# Patient Record
Sex: MALE | Race: WHITE | NOT HISPANIC OR LATINO | Employment: FULL TIME | URBAN - METROPOLITAN AREA
[De-identification: names, ages, dates, MRNs, and addresses within clinical notes are randomized per-mention and may not be internally consistent; named-entity substitution may affect disease eponyms.]

---

## 2017-02-18 ENCOUNTER — OFFICE VISIT (OUTPATIENT)
Dept: URGENT CARE | Facility: CLINIC | Age: 47
End: 2017-02-18
Payer: COMMERCIAL

## 2017-02-18 PROCEDURE — 99203 OFFICE O/P NEW LOW 30 MIN: CPT

## 2017-03-27 ENCOUNTER — ALLSCRIPTS OFFICE VISIT (OUTPATIENT)
Dept: FAMILY MEDICINE CLINIC | Facility: CLINIC | Age: 47
End: 2017-03-27
Payer: COMMERCIAL

## 2017-03-27 DIAGNOSIS — I10 ESSENTIAL (PRIMARY) HYPERTENSION: ICD-10-CM

## 2017-03-27 DIAGNOSIS — E29.1 TESTICULAR HYPOFUNCTION: ICD-10-CM

## 2017-03-27 DIAGNOSIS — E78.00 PURE HYPERCHOLESTEROLEMIA: ICD-10-CM

## 2017-03-27 DIAGNOSIS — R53.83 OTHER FATIGUE: ICD-10-CM

## 2017-03-27 PROCEDURE — 99213 OFFICE O/P EST LOW 20 MIN: CPT | Performed by: PHYSICIAN ASSISTANT

## 2017-05-22 ENCOUNTER — GENERIC CONVERSION - ENCOUNTER (OUTPATIENT)
Dept: OTHER | Facility: OTHER | Age: 47
End: 2017-05-22

## 2017-11-29 ENCOUNTER — ALLSCRIPTS OFFICE VISIT (OUTPATIENT)
Dept: FAMILY MEDICINE CLINIC | Facility: CLINIC | Age: 47
End: 2017-11-29
Payer: COMMERCIAL

## 2017-11-29 DIAGNOSIS — M47.816 SPONDYLOSIS OF LUMBAR REGION WITHOUT MYELOPATHY OR RADICULOPATHY: ICD-10-CM

## 2017-11-29 PROCEDURE — 99214 OFFICE O/P EST MOD 30 MIN: CPT | Performed by: FAMILY MEDICINE

## 2017-11-30 NOTE — PROGRESS NOTES
Assessment    1  Benign hypertension (401 1) (I10)   2  ADHD (attention deficit hyperactivity disorder), combined type (314 01) (F90 2)   3  Spondylolisthesis of sacral region (756 12) (M43 18)   4  Lumbar spondylosis (721 3) (M47 816)   5  Borderline hyperlipidemia (272 4) (E78 5)    Plan  Lumbar spondylosis    · Atorvastatin Calcium 10 MG Oral Tablet; TAKE 1 TABLET DAILY   · *1 - [facility]  *  Status: Active  Requested for: 24HXC2289  Care Summary provided  : Yes    Discussion/Summary    Will try slo mag vision is ok will set up physical therapy of r spondylolisthesis sacral spine still with memory issues and will try prevegen  The patient was counseled regarding  Chief Complaint  Patient is here for a routine follow up  Patient states he had a flu shot last month  Patient would like to discuss back issues  History of Present Illness  Pt is here for checkup and feeling ok and appeite is ok and sleeps hit and miss most of the time ok Energy level ok gym x 4 a week bm are ok Back pain is getting worse      Review of Systems   Constitutional: No fever or chills, feels well, no tiredness, no recent weight gain or weight loss  Eyes: No complaints of eye pain, no red eyes, no discharge from eyes, no itchy eyes  ENT: no complaints of earache, no hearing loss, no nosebleeds, no nasal discharge, no sore throat, no hoarseness  Cardiovascular: No complaints of slow heart rate, no fast heart rate, no chest pain, no palpitations, no leg claudication, no lower extremity  Respiratory: No complaints of shortness of breath, no wheezing, no cough, no SOB on exertion, no orthopnea or PND  Gastrointestinal: No complaints of abdominal pain, no constipation, no nausea or vomiting, no diarrhea or bloody stools  Genitourinary: No complaints of dysuria, no incontinence, no hesitancy, no nocturia, no genital lesion, no testicular pain    Musculoskeletal: myalgias, but-- No complaints of arthralgia, no myalgias, no joint swelling or stiffness, no limb pain or swelling  Integumentary: No complaints of skin rash or skin lesions, no itching, no skin wound, no dry skin  Neurological: No compliants of headache, no confusion, no convulsions, no numbness or tingling, no dizziness or fainting, no limb weakness, no difficulty walking  Psychiatric: Is not suicidal, no sleep disturbances, no anxiety or depression, no change in personality, no emotional problems  Endocrine: No complaints of proptosis, no hot flashes, no muscle weakness, no erectile dysfunction, no deepening of the voice, no feelings of weakness  Hematologic/Lymphatic: No complaints of swollen glands, no swollen glands in the neck, does not bleed easily, no easy bruising  ROS reviewed  Active Problems  1  ADHD (attention deficit hyperactivity disorder), combined type (314 01) (F90 2)   2  Benign hypertension (401 1) (I10)   3  Depression (311) (F32 9)   4  Fatigue (780 79) (R53 83)   5  Herpes zoster (053 9) (B02 9)   6  High cholesterol (272 0) (E78 00)   7  Leg cramping (729 82) (R25 2)   8  Low testosterone (259 9) (E34 9)   9  Screening for thyroid disorder (V77 0) (Z13 29)   10  Viral gastroenteritis (008 8) (A08 4)    Past Medical History  1  History of erectile dysfunction (V13 89) (J13 596)    The active problems and past medical history were reviewed and updated today  Surgical History  1  History of Elbow Surgery   2  History of Surgery Vas Deferens Vasectomy    The surgical history was reviewed and updated today  Family History  Mother    1  No pertinent family history  Father    2  No pertinent family history    The family history was reviewed and updated today  Social History     · Daily caffeine consumption   ·    · Non-smoker (V49 89) (Z78 9)   · Social alcohol use (Z78 9)  The social history was reviewed and updated today  Current Meds   1   FLUoxetine HCl - 20 MG Oral Tablet; TAKE 1 TABLET DAILY; Therapy: 85CBD8655 to (Evaluate:23Aej5189)  Requested for: 04Llp9372; Last Rx:[id number] Ordered   2  Strattera 40 MG Oral Capsule; Take 1 capsule twice daily; Therapy: 36WRE7896 to (Evaluate:02Msd4570)  Requested for: 12XXJ2284; Last Rx:[id number] Ordered   3  Testosterone 30 MG/ACT Transdermal Solution; USE 2 SWIPES UNDER EACH ARM DAILY; Therapy: 17NQS6094 to (411 8283)  Requested for: 04QYK7436; Last Rx:[id number] Ordered    The medication list was reviewed and updated today  Allergies  1  No Known Drug Allergies  2  Adhesive Tape    Vitals  Vital Signs    Recorded: 69NGE4306 09:00AM   Temperature 96 7 F   Heart Rate 87   Respiration 16   Systolic 478   Diastolic 86   Height 5 ft 6 in   Weight 177 lb    BMI Calculated 28 57   BSA Calculated 1 9   O2 Saturation 98       Physical Exam   Constitutional  General appearance: No acute distress, well appearing and well nourished  Eyes  Conjunctiva and lids: No swelling, erythema, or discharge  Pupils and irises: Equal, round and reactive to light  Ears, Nose, Mouth, and Throat  External inspection of ears and nose: Normal    Otoscopic examination: Tympanic membrance translucent with normal light reflex  Canals patent without erythema  Nasal mucosa, septum, and turbinates: Normal without edema or erythema  Oropharynx: Normal with no erythema, edema, exudate or lesions  Pulmonary  Respiratory effort: No increased work of breathing or signs of respiratory distress  Auscultation of lungs: Clear to auscultation, equal breath sounds bilaterally, no wheezes, no rales, no rhonci  Cardiovascular  Palpation of heart: Normal PMI, no thrills  Auscultation of heart: Normal rate and rhythm, normal S1 and S2, without murmurs  Examination of extremities for edema and/or varicosities: Normal    Carotid pulses: Normal    Abdomen  Abdomen: Non-tender, no masses  Liver and spleen: No hepatomegaly or splenomegaly     Lymphatic  Palpation of lymph nodes in neck: No lymphadenopathy  Musculoskeletal  Gait and station: Normal    Digits and nails: Normal without clubbing or cyanosis  Inspection/palpation of joints, bones, and muscles: Normal    Skin  Skin and subcutaneous tissue: Normal without rashes or lesions  Neurologic  Cranial nerves: Cranial nerves 2-12 intact  Reflexes: 2+ and symmetric  Sensation: No sensory loss     Psychiatric  Orientation to person, place and time: Normal    Mood and affect: Normal          Signatures   Electronically signed by : Lorin Campbell [facility]; Nov 29 2017  9:34AM EST                       (Author)    Electronically signed by : Shelby Boogie MD; Nov 29 2017 11:26AM EST                       (Author)

## 2017-12-12 ENCOUNTER — APPOINTMENT (OUTPATIENT)
Dept: PHYSICAL THERAPY | Facility: REHABILITATION | Age: 47
End: 2017-12-12
Payer: COMMERCIAL

## 2017-12-12 DIAGNOSIS — M47.816 SPONDYLOSIS OF LUMBAR REGION WITHOUT MYELOPATHY OR RADICULOPATHY: ICD-10-CM

## 2017-12-12 PROCEDURE — 97161 PT EVAL LOW COMPLEX 20 MIN: CPT

## 2017-12-12 PROCEDURE — G8991 OTHER PT/OT GOAL STATUS: HCPCS

## 2017-12-12 PROCEDURE — G8990 OTHER PT/OT CURRENT STATUS: HCPCS

## 2017-12-14 ENCOUNTER — APPOINTMENT (OUTPATIENT)
Dept: PHYSICAL THERAPY | Facility: REHABILITATION | Age: 47
End: 2017-12-14
Payer: COMMERCIAL

## 2017-12-14 PROCEDURE — 97110 THERAPEUTIC EXERCISES: CPT

## 2017-12-14 PROCEDURE — 97140 MANUAL THERAPY 1/> REGIONS: CPT

## 2017-12-14 PROCEDURE — 97112 NEUROMUSCULAR REEDUCATION: CPT

## 2017-12-18 ENCOUNTER — APPOINTMENT (OUTPATIENT)
Dept: PHYSICAL THERAPY | Facility: REHABILITATION | Age: 47
End: 2017-12-18
Payer: COMMERCIAL

## 2017-12-18 PROCEDURE — 97140 MANUAL THERAPY 1/> REGIONS: CPT

## 2017-12-18 PROCEDURE — 97110 THERAPEUTIC EXERCISES: CPT

## 2017-12-18 PROCEDURE — 97112 NEUROMUSCULAR REEDUCATION: CPT

## 2017-12-21 ENCOUNTER — APPOINTMENT (OUTPATIENT)
Dept: PHYSICAL THERAPY | Facility: REHABILITATION | Age: 47
End: 2017-12-21
Payer: COMMERCIAL

## 2017-12-28 ENCOUNTER — APPOINTMENT (OUTPATIENT)
Dept: PHYSICAL THERAPY | Facility: REHABILITATION | Age: 47
End: 2017-12-28
Payer: COMMERCIAL

## 2017-12-28 PROCEDURE — 97112 NEUROMUSCULAR REEDUCATION: CPT

## 2017-12-28 PROCEDURE — 97140 MANUAL THERAPY 1/> REGIONS: CPT

## 2017-12-28 PROCEDURE — 97110 THERAPEUTIC EXERCISES: CPT

## 2017-12-29 ENCOUNTER — APPOINTMENT (OUTPATIENT)
Dept: PHYSICAL THERAPY | Facility: REHABILITATION | Age: 47
End: 2017-12-29
Payer: COMMERCIAL

## 2017-12-29 PROCEDURE — 97110 THERAPEUTIC EXERCISES: CPT

## 2017-12-29 PROCEDURE — 97112 NEUROMUSCULAR REEDUCATION: CPT

## 2017-12-29 PROCEDURE — 97140 MANUAL THERAPY 1/> REGIONS: CPT

## 2018-01-11 ENCOUNTER — GENERIC CONVERSION - ENCOUNTER (OUTPATIENT)
Dept: FAMILY MEDICINE CLINIC | Facility: CLINIC | Age: 48
End: 2018-01-11

## 2018-01-14 VITALS
DIASTOLIC BLOOD PRESSURE: 88 MMHG | HEART RATE: 87 BPM | HEIGHT: 66 IN | SYSTOLIC BLOOD PRESSURE: 122 MMHG | WEIGHT: 173 LBS | BODY MASS INDEX: 27.8 KG/M2 | RESPIRATION RATE: 16 BRPM | OXYGEN SATURATION: 97 %

## 2018-01-14 VITALS
SYSTOLIC BLOOD PRESSURE: 128 MMHG | TEMPERATURE: 96.7 F | OXYGEN SATURATION: 98 % | BODY MASS INDEX: 28.45 KG/M2 | HEIGHT: 66 IN | DIASTOLIC BLOOD PRESSURE: 86 MMHG | HEART RATE: 87 BPM | RESPIRATION RATE: 16 BRPM | WEIGHT: 177 LBS

## 2018-01-30 ENCOUNTER — LAB (OUTPATIENT)
Dept: LAB | Facility: CLINIC | Age: 48
End: 2018-01-30
Payer: COMMERCIAL

## 2018-01-30 ENCOUNTER — TRANSCRIBE ORDERS (OUTPATIENT)
Dept: RADIOLOGY | Facility: CLINIC | Age: 48
End: 2018-01-30

## 2018-01-30 DIAGNOSIS — I10 ESSENTIAL (PRIMARY) HYPERTENSION: ICD-10-CM

## 2018-01-30 DIAGNOSIS — E78.00 PURE HYPERCHOLESTEROLEMIA: ICD-10-CM

## 2018-01-30 DIAGNOSIS — E29.1 TESTICULAR HYPOFUNCTION: ICD-10-CM

## 2018-01-30 DIAGNOSIS — R53.83 OTHER FATIGUE: ICD-10-CM

## 2018-01-30 LAB
ALBUMIN SERPL BCP-MCNC: 4.2 G/DL (ref 3.5–5)
ALP SERPL-CCNC: 63 U/L (ref 46–116)
ALT SERPL W P-5'-P-CCNC: 36 U/L (ref 12–78)
ANION GAP SERPL CALCULATED.3IONS-SCNC: 4 MMOL/L (ref 4–13)
AST SERPL W P-5'-P-CCNC: 27 U/L (ref 5–45)
BASOPHILS # BLD AUTO: 0.03 THOUSANDS/ΜL (ref 0–0.1)
BASOPHILS NFR BLD AUTO: 1 % (ref 0–1)
BILIRUB SERPL-MCNC: 0.63 MG/DL (ref 0.2–1)
BUN SERPL-MCNC: 19 MG/DL (ref 5–25)
CALCIUM SERPL-MCNC: 8.9 MG/DL (ref 8.3–10.1)
CHLORIDE SERPL-SCNC: 104 MMOL/L (ref 100–108)
CHOLEST SERPL-MCNC: 152 MG/DL (ref 50–200)
CO2 SERPL-SCNC: 30 MMOL/L (ref 21–32)
CREAT SERPL-MCNC: 1.14 MG/DL (ref 0.6–1.3)
EOSINOPHIL # BLD AUTO: 0.61 THOUSAND/ΜL (ref 0–0.61)
EOSINOPHIL NFR BLD AUTO: 11 % (ref 0–6)
ERYTHROCYTE [DISTWIDTH] IN BLOOD BY AUTOMATED COUNT: 12.5 % (ref 11.6–15.1)
GFR SERPL CREATININE-BSD FRML MDRD: 76 ML/MIN/1.73SQ M
GLUCOSE P FAST SERPL-MCNC: 94 MG/DL (ref 65–99)
HCT VFR BLD AUTO: 48 % (ref 36.5–49.3)
HDLC SERPL-MCNC: 46 MG/DL (ref 40–60)
HGB BLD-MCNC: 17.1 G/DL (ref 12–17)
LDLC SERPL CALC-MCNC: 87 MG/DL (ref 0–100)
LYMPHOCYTES # BLD AUTO: 1.92 THOUSANDS/ΜL (ref 0.6–4.47)
LYMPHOCYTES NFR BLD AUTO: 35 % (ref 14–44)
MCH RBC QN AUTO: 29.9 PG (ref 26.8–34.3)
MCHC RBC AUTO-ENTMCNC: 35.6 G/DL (ref 31.4–37.4)
MCV RBC AUTO: 84 FL (ref 82–98)
MONOCYTES # BLD AUTO: 0.57 THOUSAND/ΜL (ref 0.17–1.22)
MONOCYTES NFR BLD AUTO: 10 % (ref 4–12)
NEUTROPHILS # BLD AUTO: 2.37 THOUSANDS/ΜL (ref 1.85–7.62)
NEUTS SEG NFR BLD AUTO: 43 % (ref 43–75)
NRBC BLD AUTO-RTO: 0 /100 WBCS
PLATELET # BLD AUTO: 273 THOUSANDS/UL (ref 149–390)
PMV BLD AUTO: 9.7 FL (ref 8.9–12.7)
POTASSIUM SERPL-SCNC: 4.1 MMOL/L (ref 3.5–5.3)
PROT SERPL-MCNC: 7.7 G/DL (ref 6.4–8.2)
RBC # BLD AUTO: 5.72 MILLION/UL (ref 3.88–5.62)
SODIUM SERPL-SCNC: 138 MMOL/L (ref 136–145)
TRIGL SERPL-MCNC: 97 MG/DL
TSH SERPL DL<=0.05 MIU/L-ACNC: 2.36 UIU/ML (ref 0.36–3.74)
WBC # BLD AUTO: 5.51 THOUSAND/UL (ref 4.31–10.16)

## 2018-01-30 PROCEDURE — 80053 COMPREHEN METABOLIC PANEL: CPT

## 2018-01-30 PROCEDURE — 84403 ASSAY OF TOTAL TESTOSTERONE: CPT

## 2018-01-30 PROCEDURE — 36415 COLL VENOUS BLD VENIPUNCTURE: CPT

## 2018-01-30 PROCEDURE — 84402 ASSAY OF FREE TESTOSTERONE: CPT

## 2018-01-30 PROCEDURE — 85025 COMPLETE CBC W/AUTO DIFF WBC: CPT

## 2018-01-30 PROCEDURE — 80061 LIPID PANEL: CPT

## 2018-01-30 PROCEDURE — 84443 ASSAY THYROID STIM HORMONE: CPT

## 2018-01-31 LAB
TESTOST FREE SERPL-MCNC: 15.6 PG/ML (ref 6.8–21.5)
TESTOST SERPL-MCNC: 408 NG/DL (ref 264–916)

## 2018-04-09 RX ORDER — FLUOXETINE 20 MG/1
20 TABLET, FILM COATED ORAL DAILY
Refills: 3 | COMMUNITY
Start: 2018-02-07 | End: 2018-08-06 | Stop reason: SDUPTHER

## 2018-04-09 RX ORDER — TESTOSTERONE 30 MG/1.5ML
SOLUTION TOPICAL
COMMUNITY
Start: 2016-03-23 | End: 2018-04-18 | Stop reason: SDUPTHER

## 2018-04-09 RX ORDER — ATOMOXETINE 40 MG/1
40 CAPSULE ORAL 2 TIMES DAILY
Refills: 3 | COMMUNITY
Start: 2018-01-03 | End: 2018-04-11

## 2018-04-09 RX ORDER — ATORVASTATIN CALCIUM 10 MG/1
1 TABLET, FILM COATED ORAL DAILY
COMMUNITY
Start: 2017-11-29 | End: 2018-04-11

## 2018-04-11 ENCOUNTER — TELEPHONE (OUTPATIENT)
Dept: INTERNAL MEDICINE CLINIC | Facility: CLINIC | Age: 48
End: 2018-04-11

## 2018-04-11 ENCOUNTER — OFFICE VISIT (OUTPATIENT)
Dept: INTERNAL MEDICINE CLINIC | Facility: CLINIC | Age: 48
End: 2018-04-11
Payer: COMMERCIAL

## 2018-04-11 VITALS
WEIGHT: 175.2 LBS | RESPIRATION RATE: 16 BRPM | HEART RATE: 84 BPM | DIASTOLIC BLOOD PRESSURE: 80 MMHG | OXYGEN SATURATION: 98 % | TEMPERATURE: 97.6 F | SYSTOLIC BLOOD PRESSURE: 128 MMHG | BODY MASS INDEX: 27.5 KG/M2 | HEIGHT: 67 IN

## 2018-04-11 DIAGNOSIS — E78.00 PURE HYPERCHOLESTEROLEMIA: ICD-10-CM

## 2018-04-11 DIAGNOSIS — I10 BENIGN ESSENTIAL HTN: ICD-10-CM

## 2018-04-11 DIAGNOSIS — E55.9 VITAMIN D DEFICIENCY: ICD-10-CM

## 2018-04-11 DIAGNOSIS — Z13.29 SCREENING FOR THYROID DISORDER: ICD-10-CM

## 2018-04-11 DIAGNOSIS — Z12.5 SCREENING PSA (PROSTATE SPECIFIC ANTIGEN): ICD-10-CM

## 2018-04-11 DIAGNOSIS — F90.2 ADHD (ATTENTION DEFICIT HYPERACTIVITY DISORDER), COMBINED TYPE: Primary | ICD-10-CM

## 2018-04-11 PROCEDURE — 3079F DIAST BP 80-89 MM HG: CPT | Performed by: PHYSICIAN ASSISTANT

## 2018-04-11 PROCEDURE — 3074F SYST BP LT 130 MM HG: CPT | Performed by: PHYSICIAN ASSISTANT

## 2018-04-11 PROCEDURE — 99214 OFFICE O/P EST MOD 30 MIN: CPT | Performed by: PHYSICIAN ASSISTANT

## 2018-04-11 RX ORDER — PRAVASTATIN SODIUM 10 MG
10 TABLET ORAL DAILY
Qty: 30 TABLET | Refills: 5 | Status: SHIPPED | OUTPATIENT
Start: 2018-04-11 | End: 2018-05-10 | Stop reason: SDUPTHER

## 2018-04-11 NOTE — PROGRESS NOTES
Assessment/Plan:    ADHD (attention deficit hyperactivity disorder), combined type  Will start vyvanse in place of the strattera  RTO 3 months for re-check  Pure hypercholesterolemia  Start pravastatin to help continue cholesterol and improve tolerability       Diagnoses and all orders for this visit:    ADHD (attention deficit hyperactivity disorder), combined type  -     lisdexamfetamine (VYVANSE) 40 MG capsule; Take 1 capsule (40 mg total) by mouth every morning Max Daily Amount: 40 mg    Screening PSA (prostate specific antigen)  -     PSA, Total Screen; Future    Screening for thyroid disorder  -     TSH, 3rd generation with T4 reflex; Future    Benign essential HTN  -     CBC and differential; Future  -     Comprehensive metabolic panel; Future    Vitamin D deficiency  -     Vitamin D 25 hydroxy; Future    Pure hypercholesterolemia  -     Lipid panel; Future  -     pravastatin (PRAVACHOL) 10 mg tablet; Take 1 tablet (10 mg total) by mouth daily    Other orders  -     Discontinue: atoMOXetine (STRATTERA) 40 mg capsule; 40 mg 2 (two) times a day  -     Discontinue: atorvastatin (LIPITOR) 10 mg tablet; Take 1 tablet by mouth daily  -     FLUoxetine (PROzac) 20 MG tablet; Take 20 mg by mouth daily  -     Testosterone 30 MG/ACT SOLN; Place on the skin          Subjective:      Patient ID: Greg Chan is a 50 y o  male  Pt presents to transfer care from [facility]  He has a PMHx significant for anxiety, depression, ADD, low testosterone and hyperlipidemia  PSurgHx includes left elbow and shoulder surgery, vasectomy, and deviated septum repair  Allergies to adhesive  Medications include strattera, prozax, testosterone and lipitor which he stopped taking 3 months ago  He is a lifetime non smoker  He drinks socially  He works FT in marketing  He is  with 2 children  His parents are both living and healthy and he has a brother that is healthy   He has labs in January that we reviewed together in detail  This did yield improved cholesterol from the lipitor  He would like to try an option that he can tolerate better  He also no longer feels that strattera is helpful and desires an alternative  He could not tolerate adderall  The following portions of the patient's history were reviewed and updated as appropriate:   He  has a past medical history of ADD (attention deficit disorder); Anxiety; Depression; ED (erectile dysfunction); and Hyperlipidemia  He   Patient Active Problem List    Diagnosis Date Noted    Pure hypercholesterolemia 03/23/2016    Low testosterone 03/23/2016    Depression 03/01/2016    ADHD (attention deficit hyperactivity disorder), combined type 01/20/2016     He  has a past surgical history that includes Elbow surgery; Vasectomy; and Nasal septum surgery  His family history includes No Known Problems in his father and mother  He  reports that he has never smoked  He has never used smokeless tobacco  He reports that he drinks alcohol  He reports that he does not use drugs  Current Outpatient Prescriptions   Medication Sig Dispense Refill    FLUoxetine (PROzac) 20 MG tablet Take 20 mg by mouth daily  3    Testosterone 30 MG/ACT SOLN Place on the skin      lisdexamfetamine (VYVANSE) 40 MG capsule Take 1 capsule (40 mg total) by mouth every morning Max Daily Amount: 40 mg 30 capsule 0    pravastatin (PRAVACHOL) 10 mg tablet Take 1 tablet (10 mg total) by mouth daily 30 tablet 5     No current facility-administered medications for this visit  No current outpatient prescriptions on file prior to visit  No current facility-administered medications on file prior to visit  He is allergic to adhesive [medical tape]       Review of Systems   Constitutional: Negative for chills and fever  HENT: Negative for congestion, ear pain, hearing loss, postnasal drip, rhinorrhea, sinus pain, sinus pressure, sore throat and trouble swallowing      Eyes: Negative for pain and visual disturbance  Respiratory: Negative for cough, chest tightness, shortness of breath and wheezing  Cardiovascular: Negative  Negative for chest pain, palpitations and leg swelling  Gastrointestinal: Negative for abdominal pain, blood in stool, constipation, diarrhea, nausea and vomiting  Endocrine: Negative for cold intolerance, heat intolerance, polydipsia, polyphagia and polyuria  Genitourinary: Negative for difficulty urinating, dysuria, flank pain and urgency  Musculoskeletal: Negative for arthralgias, back pain, gait problem and myalgias  Skin: Negative for rash  Allergic/Immunologic: Negative  Neurological: Negative for dizziness, weakness, light-headedness and headaches  Hematological: Negative  Psychiatric/Behavioral: Positive for decreased concentration  Negative for behavioral problems, dysphoric mood and sleep disturbance  The patient is not nervous/anxious  Objective:      /80 (BP Location: Left arm, Patient Position: Sitting, Cuff Size: Standard)   Pulse 84   Temp 97 6 °F (36 4 °C) (Tympanic)   Resp 16   Ht 5' 6 5" (1 689 m)   Wt 79 5 kg (175 lb 3 2 oz)   SpO2 98%   BMI 27 85 kg/m²          Physical Exam   Constitutional: He is oriented to person, place, and time  He appears well-developed and well-nourished  No distress  HENT:   Head: Normocephalic and atraumatic  Right Ear: External ear normal    Left Ear: External ear normal    Nose: Nose normal    Mouth/Throat: Oropharynx is clear and moist  No oropharyngeal exudate  Eyes: Conjunctivae and EOM are normal  Pupils are equal, round, and reactive to light  Right eye exhibits no discharge  Left eye exhibits no discharge  No scleral icterus  Neck: Normal range of motion  Neck supple  No thyromegaly present  Cardiovascular: Normal rate, regular rhythm and normal heart sounds  Exam reveals no gallop and no friction rub  No murmur heard    Pulmonary/Chest: Effort normal and breath sounds normal  No respiratory distress  He has no wheezes  He has no rales  Abdominal: Soft  Bowel sounds are normal  He exhibits no distension  There is no tenderness  Musculoskeletal: Normal range of motion  He exhibits no edema, tenderness or deformity  Neurological: He is alert and oriented to person, place, and time  No cranial nerve deficit  Skin: Skin is warm and dry  He is not diaphoretic  Psychiatric: He has a normal mood and affect   His behavior is normal  Judgment and thought content normal

## 2018-04-17 DIAGNOSIS — R79.89 LOW TESTOSTERONE: Primary | ICD-10-CM

## 2018-04-17 NOTE — TELEPHONE ENCOUNTER
Pt needs rx for testosterone 30mg solution, needs 90 supply, also asking if anyone started the prior auth for the Vyvanse 40mg?  Pt states it needs Melida Patricio

## 2018-04-18 RX ORDER — TESTOSTERONE 30 MG/1.5ML
1 SOLUTION TOPICAL DAILY
Qty: 90 ML | Refills: 1 | Status: SHIPPED | OUTPATIENT
Start: 2018-04-18 | End: 2018-04-19 | Stop reason: SDUPTHER

## 2018-04-19 ENCOUNTER — TELEPHONE (OUTPATIENT)
Dept: FAMILY MEDICINE CLINIC | Facility: CLINIC | Age: 48
End: 2018-04-19

## 2018-04-19 DIAGNOSIS — R79.89 LOW TESTOSTERONE: ICD-10-CM

## 2018-04-19 RX ORDER — TESTOSTERONE 30 MG/1.5ML
1 SOLUTION TOPICAL DAILY
Qty: 90 ML | Refills: 3 | Status: SHIPPED | OUTPATIENT
Start: 2018-04-19 | End: 2018-05-09 | Stop reason: SDUPTHER

## 2018-04-26 ENCOUNTER — TELEPHONE (OUTPATIENT)
Dept: INTERNAL MEDICINE CLINIC | Facility: CLINIC | Age: 48
End: 2018-04-26

## 2018-05-03 PROBLEM — E29.1 TESTICULAR HYPOGONADISM: Status: ACTIVE | Noted: 2018-05-03

## 2018-05-08 ENCOUNTER — TELEPHONE (OUTPATIENT)
Dept: INTERNAL MEDICINE CLINIC | Facility: CLINIC | Age: 48
End: 2018-05-08

## 2018-05-09 ENCOUNTER — TELEPHONE (OUTPATIENT)
Dept: INTERNAL MEDICINE CLINIC | Facility: CLINIC | Age: 48
End: 2018-05-09

## 2018-05-09 DIAGNOSIS — R79.89 LOW TESTOSTERONE: ICD-10-CM

## 2018-05-09 RX ORDER — TESTOSTERONE 30 MG/1.5ML
4 SOLUTION TOPICAL 4 TIMES DAILY
Qty: 360 ACT | Refills: 0 | Status: SHIPPED | OUTPATIENT
Start: 2018-05-09 | End: 2018-05-11 | Stop reason: SDUPTHER

## 2018-05-10 ENCOUNTER — TELEPHONE (OUTPATIENT)
Dept: INTERNAL MEDICINE CLINIC | Facility: CLINIC | Age: 48
End: 2018-05-10

## 2018-05-10 DIAGNOSIS — E78.00 PURE HYPERCHOLESTEROLEMIA: ICD-10-CM

## 2018-05-10 DIAGNOSIS — F90.2 ADHD (ATTENTION DEFICIT HYPERACTIVITY DISORDER), COMBINED TYPE: ICD-10-CM

## 2018-05-10 RX ORDER — PRAVASTATIN SODIUM 10 MG
10 TABLET ORAL DAILY
Qty: 90 TABLET | Refills: 3 | Status: SHIPPED | OUTPATIENT
Start: 2018-05-10 | End: 2018-09-06

## 2018-05-10 NOTE — TELEPHONE ENCOUNTER
Pt called, was given rx for Vyvanse 40mg, pt stated you told him to stop one of his meds, pt asking if it was the strattera?  Not sure of what you told him

## 2018-05-10 NOTE — TELEPHONE ENCOUNTER
Pt needs rx for pravachol 10mg, qd, needs 90 day supply, [facility]

## 2018-05-11 DIAGNOSIS — R79.89 LOW TESTOSTERONE: ICD-10-CM

## 2018-05-11 RX ORDER — TESTOSTERONE 30 MG/1.5ML
4 SOLUTION TOPICAL 4 TIMES DAILY
Qty: 540 ACT | Refills: 5 | Status: SHIPPED | OUTPATIENT
Start: 2018-05-11 | End: 2018-11-05

## 2018-06-22 DIAGNOSIS — G89.29 CHRONIC MIDLINE LOW BACK PAIN WITHOUT SCIATICA: Primary | ICD-10-CM

## 2018-06-22 DIAGNOSIS — G89.29 CHRONIC MIDLINE LOW BACK PAIN WITHOUT SCIATICA: ICD-10-CM

## 2018-06-22 DIAGNOSIS — M54.50 CHRONIC MIDLINE LOW BACK PAIN WITHOUT SCIATICA: Primary | ICD-10-CM

## 2018-06-22 DIAGNOSIS — M54.50 CHRONIC MIDLINE LOW BACK PAIN WITHOUT SCIATICA: ICD-10-CM

## 2018-06-22 RX ORDER — MELOXICAM 15 MG/1
15 TABLET ORAL DAILY
Qty: 30 TABLET | Refills: 2 | Status: SHIPPED | OUTPATIENT
Start: 2018-06-22 | End: 2018-06-22 | Stop reason: SDUPTHER

## 2018-06-22 RX ORDER — MELOXICAM 15 MG/1
15 TABLET ORAL DAILY
Qty: 30 TABLET | Refills: 0 | Status: SHIPPED | OUTPATIENT
Start: 2018-06-22 | End: 2018-09-06

## 2018-07-13 ENCOUNTER — APPOINTMENT (OUTPATIENT)
Dept: LAB | Facility: CLINIC | Age: 48
End: 2018-07-13
Payer: COMMERCIAL

## 2018-07-13 DIAGNOSIS — Z13.29 SCREENING FOR THYROID DISORDER: ICD-10-CM

## 2018-07-13 DIAGNOSIS — E55.9 VITAMIN D DEFICIENCY: ICD-10-CM

## 2018-07-13 DIAGNOSIS — I10 BENIGN ESSENTIAL HTN: ICD-10-CM

## 2018-07-13 DIAGNOSIS — E78.00 PURE HYPERCHOLESTEROLEMIA: ICD-10-CM

## 2018-07-13 DIAGNOSIS — Z12.5 SCREENING PSA (PROSTATE SPECIFIC ANTIGEN): ICD-10-CM

## 2018-07-13 LAB
25(OH)D3 SERPL-MCNC: 75 NG/ML (ref 30–100)
ALBUMIN SERPL BCP-MCNC: 4 G/DL (ref 3.5–5)
ALP SERPL-CCNC: 59 U/L (ref 46–116)
ALT SERPL W P-5'-P-CCNC: 37 U/L (ref 12–78)
ANION GAP SERPL CALCULATED.3IONS-SCNC: 7 MMOL/L (ref 4–13)
AST SERPL W P-5'-P-CCNC: 22 U/L (ref 5–45)
BASOPHILS # BLD AUTO: 0.03 THOUSANDS/ΜL (ref 0–0.1)
BASOPHILS NFR BLD AUTO: 1 % (ref 0–1)
BILIRUB SERPL-MCNC: 1.07 MG/DL (ref 0.2–1)
BUN SERPL-MCNC: 23 MG/DL (ref 5–25)
CALCIUM SERPL-MCNC: 9.2 MG/DL (ref 8.3–10.1)
CHLORIDE SERPL-SCNC: 103 MMOL/L (ref 100–108)
CHOLEST SERPL-MCNC: 238 MG/DL (ref 50–200)
CO2 SERPL-SCNC: 28 MMOL/L (ref 21–32)
CREAT SERPL-MCNC: 1.17 MG/DL (ref 0.6–1.3)
EOSINOPHIL # BLD AUTO: 0.41 THOUSAND/ΜL (ref 0–0.61)
EOSINOPHIL NFR BLD AUTO: 8 % (ref 0–6)
ERYTHROCYTE [DISTWIDTH] IN BLOOD BY AUTOMATED COUNT: 12.4 % (ref 11.6–15.1)
GFR SERPL CREATININE-BSD FRML MDRD: 73 ML/MIN/1.73SQ M
GLUCOSE P FAST SERPL-MCNC: 93 MG/DL (ref 65–99)
HCT VFR BLD AUTO: 46.9 % (ref 36.5–49.3)
HDLC SERPL-MCNC: 48 MG/DL (ref 40–60)
HGB BLD-MCNC: 15.5 G/DL (ref 12–17)
IMM GRANULOCYTES # BLD AUTO: 0.01 THOUSAND/UL (ref 0–0.2)
IMM GRANULOCYTES NFR BLD AUTO: 0 % (ref 0–2)
LDLC SERPL CALC-MCNC: 164 MG/DL (ref 0–100)
LYMPHOCYTES # BLD AUTO: 1.64 THOUSANDS/ΜL (ref 0.6–4.47)
LYMPHOCYTES NFR BLD AUTO: 32 % (ref 14–44)
MCH RBC QN AUTO: 28.4 PG (ref 26.8–34.3)
MCHC RBC AUTO-ENTMCNC: 33 G/DL (ref 31.4–37.4)
MCV RBC AUTO: 86 FL (ref 82–98)
MONOCYTES # BLD AUTO: 0.45 THOUSAND/ΜL (ref 0.17–1.22)
MONOCYTES NFR BLD AUTO: 9 % (ref 4–12)
NEUTROPHILS # BLD AUTO: 2.59 THOUSANDS/ΜL (ref 1.85–7.62)
NEUTS SEG NFR BLD AUTO: 50 % (ref 43–75)
NONHDLC SERPL-MCNC: 190 MG/DL
NRBC BLD AUTO-RTO: 0 /100 WBCS
PLATELET # BLD AUTO: 260 THOUSANDS/UL (ref 149–390)
PMV BLD AUTO: 9.6 FL (ref 8.9–12.7)
POTASSIUM SERPL-SCNC: 3.9 MMOL/L (ref 3.5–5.3)
PROT SERPL-MCNC: 7.4 G/DL (ref 6.4–8.2)
PSA SERPL-MCNC: 0.6 NG/ML (ref 0–4)
RBC # BLD AUTO: 5.45 MILLION/UL (ref 3.88–5.62)
SODIUM SERPL-SCNC: 138 MMOL/L (ref 136–145)
TRIGL SERPL-MCNC: 129 MG/DL
TSH SERPL DL<=0.05 MIU/L-ACNC: 2.5 UIU/ML (ref 0.36–3.74)
WBC # BLD AUTO: 5.13 THOUSAND/UL (ref 4.31–10.16)

## 2018-07-13 PROCEDURE — 85025 COMPLETE CBC W/AUTO DIFF WBC: CPT

## 2018-07-13 PROCEDURE — 82306 VITAMIN D 25 HYDROXY: CPT

## 2018-07-13 PROCEDURE — 36415 COLL VENOUS BLD VENIPUNCTURE: CPT

## 2018-07-13 PROCEDURE — G0103 PSA SCREENING: HCPCS

## 2018-07-13 PROCEDURE — 80053 COMPREHEN METABOLIC PANEL: CPT

## 2018-07-13 PROCEDURE — 80061 LIPID PANEL: CPT

## 2018-07-13 PROCEDURE — 84443 ASSAY THYROID STIM HORMONE: CPT

## 2018-08-06 DIAGNOSIS — F90.9 ATTENTION DEFICIT HYPERACTIVITY DISORDER (ADHD), UNSPECIFIED ADHD TYPE: Primary | ICD-10-CM

## 2018-08-06 DIAGNOSIS — F32.A DEPRESSION, UNSPECIFIED DEPRESSION TYPE: ICD-10-CM

## 2018-08-06 RX ORDER — ATOMOXETINE 40 MG/1
40 CAPSULE ORAL 2 TIMES DAILY
Qty: 180 CAPSULE | Refills: 3 | Status: SHIPPED | OUTPATIENT
Start: 2018-08-06 | End: 2018-11-05

## 2018-08-06 RX ORDER — FLUOXETINE 20 MG/1
20 TABLET, FILM COATED ORAL DAILY
Qty: 90 TABLET | Refills: 3 | Status: SHIPPED | OUTPATIENT
Start: 2018-08-06 | End: 2019-08-04 | Stop reason: SDUPTHER

## 2018-09-06 ENCOUNTER — OFFICE VISIT (OUTPATIENT)
Dept: INTERNAL MEDICINE CLINIC | Facility: CLINIC | Age: 48
End: 2018-09-06
Payer: COMMERCIAL

## 2018-09-06 VITALS
TEMPERATURE: 97.7 F | WEIGHT: 176 LBS | RESPIRATION RATE: 18 BRPM | BODY MASS INDEX: 27.62 KG/M2 | HEART RATE: 78 BPM | OXYGEN SATURATION: 96 % | DIASTOLIC BLOOD PRESSURE: 88 MMHG | SYSTOLIC BLOOD PRESSURE: 132 MMHG | HEIGHT: 67 IN

## 2018-09-06 DIAGNOSIS — M48.061 SPINAL STENOSIS OF LUMBAR REGION, UNSPECIFIED WHETHER NEUROGENIC CLAUDICATION PRESENT: Primary | ICD-10-CM

## 2018-09-06 DIAGNOSIS — E78.00 PURE HYPERCHOLESTEROLEMIA: ICD-10-CM

## 2018-09-06 DIAGNOSIS — F90.2 ADHD (ATTENTION DEFICIT HYPERACTIVITY DISORDER), COMBINED TYPE: ICD-10-CM

## 2018-09-06 DIAGNOSIS — M48.061 SPINAL STENOSIS OF LUMBAR REGION, UNSPECIFIED WHETHER NEUROGENIC CLAUDICATION PRESENT: ICD-10-CM

## 2018-09-06 DIAGNOSIS — H93.8X2 EAR LUMP, LEFT: ICD-10-CM

## 2018-09-06 PROCEDURE — 99214 OFFICE O/P EST MOD 30 MIN: CPT | Performed by: PHYSICIAN ASSISTANT

## 2018-09-06 PROCEDURE — 96372 THER/PROPH/DIAG INJ SC/IM: CPT | Performed by: PHYSICIAN ASSISTANT

## 2018-09-06 PROCEDURE — 3008F BODY MASS INDEX DOCD: CPT | Performed by: PHYSICIAN ASSISTANT

## 2018-09-06 RX ORDER — DEXAMETHASONE SODIUM PHOSPHATE 4 MG/ML
4 INJECTION, SOLUTION INTRA-ARTICULAR; INTRALESIONAL; INTRAMUSCULAR; INTRAVENOUS; SOFT TISSUE EVERY 6 HOURS SCHEDULED
Status: DISCONTINUED | OUTPATIENT
Start: 2018-09-06 | End: 2018-09-06

## 2018-09-06 RX ORDER — DEXAMETHASONE SODIUM PHOSPHATE 4 MG/ML
4 INJECTION, SOLUTION INTRA-ARTICULAR; INTRALESIONAL; INTRAMUSCULAR; INTRAVENOUS; SOFT TISSUE ONCE
Status: COMPLETED | OUTPATIENT
Start: 2018-09-06 | End: 2018-09-06

## 2018-09-06 RX ORDER — EZETIMIBE 10 MG/1
10 TABLET ORAL DAILY
Qty: 90 TABLET | Refills: 1 | Status: SHIPPED | OUTPATIENT
Start: 2018-09-06 | End: 2018-11-05

## 2018-09-06 RX ORDER — CYCLOBENZAPRINE HCL 10 MG
10 TABLET ORAL 3 TIMES DAILY PRN
Qty: 30 TABLET | Refills: 0 | Status: SHIPPED | OUTPATIENT
Start: 2018-09-06 | End: 2018-09-06 | Stop reason: SDUPTHER

## 2018-09-06 RX ORDER — KETOROLAC TROMETHAMINE 30 MG/ML
60 INJECTION, SOLUTION INTRAMUSCULAR; INTRAVENOUS ONCE
Status: COMPLETED | OUTPATIENT
Start: 2018-09-06 | End: 2018-09-06

## 2018-09-06 RX ORDER — DEXTROAMPHETAMINE SACCHARATE, AMPHETAMINE ASPARTATE MONOHYDRATE, DEXTROAMPHETAMINE SULFATE AND AMPHETAMINE SULFATE 5; 5; 5; 5 MG/1; MG/1; MG/1; MG/1
20 CAPSULE, EXTENDED RELEASE ORAL EVERY MORNING
Qty: 30 CAPSULE | Refills: 0 | Status: SHIPPED | OUTPATIENT
Start: 2018-09-06 | End: 2018-11-05

## 2018-09-06 RX ORDER — CYCLOBENZAPRINE HCL 10 MG
10 TABLET ORAL 3 TIMES DAILY PRN
Qty: 30 TABLET | Refills: 0 | Status: SHIPPED | OUTPATIENT
Start: 2018-09-06 | End: 2018-11-05

## 2018-09-06 RX ADMIN — DEXAMETHASONE SODIUM PHOSPHATE 4 MG: 4 INJECTION, SOLUTION INTRA-ARTICULAR; INTRALESIONAL; INTRAMUSCULAR; INTRAVENOUS; SOFT TISSUE at 14:24

## 2018-09-06 RX ADMIN — DEXAMETHASONE SODIUM PHOSPHATE 4 MG: 4 INJECTION, SOLUTION INTRA-ARTICULAR; INTRALESIONAL; INTRAMUSCULAR; INTRAVENOUS; SOFT TISSUE at 14:26

## 2018-09-06 RX ADMIN — DEXAMETHASONE SODIUM PHOSPHATE 4 MG: 4 INJECTION, SOLUTION INTRA-ARTICULAR; INTRALESIONAL; INTRAMUSCULAR; INTRAVENOUS; SOFT TISSUE at 09:55

## 2018-09-06 RX ADMIN — DEXAMETHASONE SODIUM PHOSPHATE 4 MG: 4 INJECTION, SOLUTION INTRA-ARTICULAR; INTRALESIONAL; INTRAMUSCULAR; INTRAVENOUS; SOFT TISSUE at 14:22

## 2018-09-06 RX ADMIN — DEXAMETHASONE SODIUM PHOSPHATE 4 MG: 4 INJECTION, SOLUTION INTRA-ARTICULAR; INTRALESIONAL; INTRAMUSCULAR; INTRAVENOUS; SOFT TISSUE at 16:14

## 2018-09-06 RX ADMIN — KETOROLAC TROMETHAMINE 60 MG: 30 INJECTION, SOLUTION INTRAMUSCULAR; INTRAVENOUS at 09:54

## 2018-09-06 NOTE — ASSESSMENT & PLAN NOTE
Will try pt with adderall to combat ADD symptoms  Abuse potential and side effects reviewed and pt verbalized understanding

## 2018-09-06 NOTE — PROGRESS NOTES
Assessment/Plan:    Spinal stenosis of lumbar region  Pt tried multiple NSAIDs without relief  Also did PT and chiropractor without benefit  Will update imaging  Will also give 60mg toradol and 4mg decadron IM to combat pain  Will give flexeril to use at bed for spasm and stiffness  Patient was informed that this medicine has an abuse potential and may be habit forming  We discussed that this may also cause drowsiness  The medication should not be combined with alcohol  The patient was informed not to operate machinery while taking this medication and should not drive until they know how they respond to the medication  The patient verbalized understanding of this  ADHD (attention deficit hyperactivity disorder), combined type  Will try pt with adderall to combat ADD symptoms  Abuse potential and side effects reviewed and pt verbalized understanding  Ear lump, left  Lump is benign and is cartilage  No intervention needed  Pure hypercholesterolemia  Pt is statin intolerant  Will try zetia to improve LDL       Diagnoses and all orders for this visit:    Spinal stenosis of lumbar region, unspecified whether neurogenic claudication present  -     MRI lumbar spine w wo contrast; Future  -     cyclobenzaprine (FLEXERIL) 10 mg tablet; Take 1 tablet (10 mg total) by mouth 3 (three) times a day as needed for muscle spasms  -     ketorolac (TORADOL) 60 mg/2 mL IM injection 60 mg; Inject 2 mL (60 mg total) into a muscle once   -     dexamethasone (DECADRON) injection 4 mg; Inject 1 mL (4 mg total) into a muscle every 6 (six) hours     Pure hypercholesterolemia  -     ezetimibe (ZETIA) 10 mg tablet; Take 1 tablet (10 mg total) by mouth daily    ADHD (attention deficit hyperactivity disorder), combined type  -     amphetamine-dextroamphetamine (ADDERALL XR) 20 MG 24 hr capsule;  Take 1 capsule (20 mg total) by mouth every morning Max Daily Amount: 20 mg    Ear lump, left          Subjective:      Patient ID: Tika Tim Jose C Jacob is a 50 y o  male  Pt presents for routine visit  He complains of back pain as outlined below  He also complains of a small firm lump in his left ear lobe  No hx of trauma or piercing  It is non tender and not red  It has been present for over a year  He also complains of decreased focus and concentration  He is easily distracted and starts many tasks and finishes a few  He has a +Fhx of ADD as well as a personal hx of it  He tried both strattera and vyvanse with only slight improvement  His daughter is on adderall with improved symptoms  We also reviewed his labs which showed an elevated LDL over 160  This is up from previous as he stopped his statin since the last blood draw due to side effects  Back Pain   This is a recurrent problem  The current episode started more than 1 month ago  The problem occurs daily  The problem has been gradually worsening since onset  The pain is present in the lumbar spine  The quality of the pain is described as aching  The pain does not radiate  The pain is at a severity of 8/10  The pain is moderate  The symptoms are aggravated by coughing and position  Pertinent negatives include no abdominal pain, bladder incontinence, chest pain, dysuria, fever, headaches, numbness, tingling or weakness  He has tried chiropractic manipulation and NSAIDs (PT) for the symptoms  The treatment provided mild relief  The following portions of the patient's history were reviewed and updated as appropriate:   He  has a past medical history of ADD (attention deficit disorder); Anxiety; Depression; ED (erectile dysfunction); and Hyperlipidemia    He   Patient Active Problem List    Diagnosis Date Noted    Spinal stenosis of lumbar region 09/06/2018    Ear lump, left 09/06/2018    Testicular hypogonadism 05/03/2018    Pure hypercholesterolemia 03/23/2016    Depression 03/01/2016    ADHD (attention deficit hyperactivity disorder), combined type 01/20/2016     He  has a past surgical history that includes Elbow surgery; Vasectomy; and Nasal septum surgery  His family history includes No Known Problems in his father and mother  He  reports that he has never smoked  He has never used smokeless tobacco  He reports that he drinks alcohol  He reports that he does not use drugs    Current Outpatient Prescriptions   Medication Sig Dispense Refill    atoMOXetine (STRATTERA) 40 mg capsule Take 1 capsule (40 mg total) by mouth 2 (two) times a day 180 capsule 3    FLUoxetine (PROzac) 20 MG tablet Take 1 tablet (20 mg total) by mouth daily 90 tablet 3    tadalafil (CIALIS) 20 MG tablet Cialis 20 mg tablet      Testosterone 30 MG/ACT SOLN 4 Act (120 mg total) by Pump route 4 (four) times a day 540 Act 5    amphetamine-dextroamphetamine (ADDERALL XR) 20 MG 24 hr capsule Take 1 capsule (20 mg total) by mouth every morning Max Daily Amount: 20 mg 30 capsule 0    cyclobenzaprine (FLEXERIL) 10 mg tablet Take 1 tablet (10 mg total) by mouth 3 (three) times a day as needed for muscle spasms 30 tablet 0    ezetimibe (ZETIA) 10 mg tablet Take 1 tablet (10 mg total) by mouth daily 90 tablet 1     Current Facility-Administered Medications   Medication Dose Route Frequency Provider Last Rate Last Dose    dexamethasone (DECADRON) injection 4 mg  4 mg Intramuscular Q6H [facility] Gabby Mireles PA-C   4 mg at 09/06/18 0018     Current Outpatient Prescriptions on File Prior to Visit   Medication Sig    atoMOXetine (STRATTERA) 40 mg capsule Take 1 capsule (40 mg total) by mouth 2 (two) times a day    FLUoxetine (PROzac) 20 MG tablet Take 1 tablet (20 mg total) by mouth daily    tadalafil (CIALIS) 20 MG tablet Cialis 20 mg tablet    Testosterone 30 MG/ACT SOLN 4 Act (120 mg total) by Pump route 4 (four) times a day    [DISCONTINUED] ALPRAZolam ER (XANAX XR) 1 MG 24 hr tablet TK 1 T PO HS    [DISCONTINUED] Azelaic Acid (FINACEA) 15 % cream Finacea 15 % topical gel    [DISCONTINUED] clobetasol propionate (CLOBEX) 0 05 % shampoo clobetasol 0 05 % shampoo    [DISCONTINUED] DULoxetine (CYMBALTA) 30 mg delayed release capsule TAKE ONE CAPSULE BY MOUTH EVERY DAY    [DISCONTINUED] FLUoxetine (PROzac) 10 mg capsule fluoxetine 10 mg capsule    [DISCONTINUED] HYDROcodone-acetaminophen (NORCO) 5-325 mg per tablet hydrocodone 5 mg-acetaminophen 325 mg tablet    [DISCONTINUED] HYDROmorphone (DILAUDID) 2 mg tablet hydromorphone 2 mg tablet    [DISCONTINUED] indomethacin (INDOCIN) 25 mg capsule indomethacin 25 mg capsule    [DISCONTINUED] lisdexamfetamine (VYVANSE) 40 MG capsule Take 1 capsule (40 mg total) by mouth every morning Max Daily Amount: 40 mg (Patient not taking: Reported on 9/6/2018 )    [DISCONTINUED] meloxicam (MOBIC) 15 mg tablet Take 1 tablet (15 mg total) by mouth daily (Patient not taking: Reported on 9/6/2018 )    [DISCONTINUED] Naproxen-Esomeprazole (VIMOVO) 500-20 MG TBEC Every 12 hours    [DISCONTINUED] pravastatin (PRAVACHOL) 10 mg tablet Take 1 tablet (10 mg total) by mouth daily (Patient not taking: Reported on 9/6/2018 )    [DISCONTINUED] traZODone (DESYREL) 100 mg tablet trazodone 100 mg tablet     No current facility-administered medications on file prior to visit  He is allergic to adhesive [medical tape]       Review of Systems   Constitutional: Negative for chills and fever  HENT: Negative for congestion, ear pain, hearing loss, postnasal drip, rhinorrhea, sinus pain, sinus pressure, sore throat and trouble swallowing  Eyes: Negative for pain and visual disturbance  Respiratory: Negative for cough, chest tightness, shortness of breath and wheezing  Cardiovascular: Negative  Negative for chest pain, palpitations and leg swelling  Gastrointestinal: Negative for abdominal pain, blood in stool, constipation, diarrhea, nausea and vomiting  Endocrine: Negative for cold intolerance, heat intolerance, polydipsia, polyphagia and polyuria     Genitourinary: Negative for bladder incontinence, difficulty urinating, dysuria, flank pain and urgency  Musculoskeletal: Positive for back pain  Negative for arthralgias, gait problem and myalgias  Skin: Negative for rash  Allergic/Immunologic: Negative  Neurological: Negative for dizziness, tingling, weakness, light-headedness, numbness and headaches  Hematological: Negative  Psychiatric/Behavioral: Positive for decreased concentration  Negative for behavioral problems, dysphoric mood and sleep disturbance  The patient is not nervous/anxious  Objective:      /88 (BP Location: Left arm, Patient Position: Sitting, Cuff Size: Adult)   Pulse 78   Temp 97 7 °F (36 5 °C) (Tympanic)   Resp 18   Ht 5' 6 5" (1 689 m)   Wt 79 8 kg (176 lb)   SpO2 96%   BMI 27 98 kg/m²          Physical Exam   Constitutional: He is oriented to person, place, and time  He appears well-developed and well-nourished  No distress  HENT:   Head: Normocephalic and atraumatic  Right Ear: External ear normal    Left Ear: External ear normal    Ears:    Nose: Nose normal    Mouth/Throat: Oropharynx is clear and moist  No oropharyngeal exudate  Eyes: Conjunctivae and EOM are normal  Pupils are equal, round, and reactive to light  Right eye exhibits no discharge  Left eye exhibits no discharge  No scleral icterus  Neck: Normal range of motion  Neck supple  No thyromegaly present  Cardiovascular: Normal rate, regular rhythm and normal heart sounds  Exam reveals no gallop and no friction rub  No murmur heard  Pulmonary/Chest: Effort normal and breath sounds normal  No respiratory distress  He has no wheezes  He has no rales  Abdominal: Soft  Bowel sounds are normal  He exhibits no distension  There is no tenderness  Musculoskeletal: Normal range of motion  He exhibits no edema or deformity  Lumbar back: He exhibits tenderness, pain and spasm  Neurological: He is alert and oriented to person, place, and time   No cranial nerve deficit  Skin: Skin is warm and dry  He is not diaphoretic  Psychiatric: He has a normal mood and affect   His behavior is normal  Judgment and thought content normal

## 2018-09-06 NOTE — PATIENT INSTRUCTIONS
Cholesterol and Your Health   WHAT YOU NEED TO KNOW:   What is cholesterol? Cholesterol is a waxy, fat-like substance  Cholesterol is made by your body, but also comes from certain foods you eat  Your body uses cholesterol to make hormones and new cells  Your body also uses cholesterol to protect nerves  Cholesterol comes from foods such as meat and dairy products  Your total cholesterol level is made up by LDL cholesterol, HDL cholesterol, and triglycerides:  · LDL cholesterol  is called bad cholesterol  because it forms plaque in your arteries  As plaque builds up, your arteries become narrow, and less blood flows through  When plaque decreases blood flow to your heart, you may have chest pain  If plaque completely blocks an artery that bring blood to your heart, you may have a heart attack  Plaque can break off and form blood clots  Blood clots may block arteries in your brain and cause a stroke  · HDL cholesterol  is called good cholesterol  because it helps remove LDL cholesterol from your arteries  It does this by attaching to LDL cholesterol and carrying it to your liver  Your liver breaks down LDL cholesterol so your body can get rid of it  High levels of HDL cholesterol can help prevent a heart attack and stroke  Low levels of HDL cholesterol can increase your risk for heart disease, heart attack, and stroke  · Triglycerides  are a type of fat that store energy from foods you eat  High levels of triglycerides also cause plaque buildup  This can increase your risk for a heart attack or stroke  If your triglyceride level is high, your LDL cholesterol level may also be high  How does food affect my cholesterol levels? · Unhealthy fats  increase LDL cholesterol and triglyceride levels in your blood  They are found in foods high in cholesterol, saturated fat, and trans fat:     ¨ Cholesterol  is found in eggs, dairy, and meat       ¨ Saturated fat  is found in butter, cheese, ice cream, whole milk, and coconut oil  Saturated fat is also found in meat, such as sausage, hot dogs, and bologna  ¨ Trans fat  is found in liquid oils and is used in fried and baked foods  Foods that contain trans fats include chips, crackers, muffins, sweet rolls, microwave popcorn, and cookies  · Healthy fats,  also called unsaturated fats, help lower LDL cholesterol and triglyceride levels  Healthy fats include the following:     ¨ Monounsaturated fats  are found in foods such as olive oil, canola oil, avocado, nuts, and olives  ¨ Polyunsaturated fats,  such as omega 3 fats, are found in fish, such as salmon, trout, and tuna  They can also be found in plant foods such as flaxseed, walnuts, and soybeans  What other things affect my cholesterol levels? · Smoking cigarettes    · Being overweight or obese     · Drinking large amounts of alcohol    · Not enough exercise or no exercise    · Certain genes passed from your parents to you  What do I need to know about having my cholesterol checked? Adults 21to 39years of age should have their cholesterol levels checked every 4 to 6 years  Adults 45 years and older should have their cholesterol checked every 1 to 2 years  You may need your cholesterol checked more often, or at a younger age, if you have risk factors for heart disease  You may also need to have your cholesterol checked more often if you have other health conditions, such as diabetes  Blood tests are used to check cholesterol levels  Blood tests measure your levels of triglycerides, LDL cholesterol, and HDL cholesterol  What should my cholesterol level be? Your cholesterol level goal may depend on your risk for heart disease  It may also depend on your age and other health conditions  Ask your healthcare provider if the following goals are right for you:  · Your total cholesterol level  should be less than 200 mg/dL  This number may also depend on your HDL and LDL cholesterol goals       · Your LDL cholesterol level  should be less than 130 mg/dL  · Your HDL cholesterol level  should be 60 mg/dL or higher  · Your triglyceride level  should be less than 150 mg/dL  How is high cholesterol treated? Treatment for high cholesterol will also decrease your risk of heart disease, heart attack, and stroke  Treatment may include any of the following:  · Medicines  may be given to lower your LDL cholesterol, triglyceride levels, or total cholesterol level  You may need medicines to lower your cholesterol if any of the following is true:     ¨ You have a history of stroke, TIA, unstable angina, or a heart attack    ¨ Your LDL cholesterol level is 190 mg/dL or higher    ¨ You are age 36to 76years of age, have diabetes, and your LDL cholesterol is 70 mg/dL or higher    ¨ You are age 36to 76years of age, have risk factors for heart disease, and your LDL cholesterol is 70 mg/dL or higher    · Lifestyle changes  include changes to your diet, exercise, weight loss, and quitting smoking  It also includes decreasing the amount of alcohol you drink  · Supplements  include fish oil, red yeast rice, and garlic  Fish oil may help lower your triglyceride and LDL cholesterol levels  It may also increase your HDL cholesterol level  Red yeast rice may help decrease your total cholesterol level and LDL cholesterol level  Garlic may help lower your total cholesterol level  Do not take these supplements without talking to your healthcare provider  What changes can I make to the foods I eat to lower my cholesterol levels? A registered dietitian can help you create a healthy eating plan  Read food labels and choose foods low in saturated fat, trans fats, and cholesterol  · Decrease the total amount of fat you eat  Choose lean meats, fat-free or 1% fat milk, and low-fat dairy products, such as yogurt and cheese  Try to limit or avoid red meats  Limit or do not eat fried foods or baked goods such as cookies       · Replace unhealthy fats with healthy fats  Cook foods in olive oil or canola oil  Choose soft margarines that are low in saturated fat and trans fat  Seeds, nuts, and avocados are other examples of healthy fats  · Eat foods with omega-3 fats  Examples include salmon, tuna, mackerel, walnuts, and flaxseed  Eat fish 2 times per week  Children and pregnant women should not eat fish that have high levels of mercury, such as shark, swordfish, and pietro mackerel  · Increase the amount of plant-based foods you eat  Plant-based foods are low in cholesterol and fat  Eating more of these foods may help lower your cholesterol and help you lose weight  Examples of plant-based foods includes fruits, vegetables, legumes, and whole grains  Replace milk that contains dairy with almond, soy, or coconut milk  Eat beans and foods with soy for protein instead of meat  Ask your healthcare provider or dietitian for more information on plant-based foods  · Increase the amount of fiber you eat  High-fiber foods can help lower your LDL cholesterol  You should eat between 20 and 30 grams of fiber each day  Eat at least 5 servings of fruits and vegetables each day  Other examples of high-fiber foods include whole-grain or whole-wheat breads, pastas, or cereals, and brown rice  Eat 3 ounces of whole-grain foods each day  Increase fiber slowly  You may have abdominal discomfort, bloating, and gas if you add fiber to your diet too quickly  What lifestyle changes can I make to lower my cholesterol levels? · Maintain a healthy weight  Ask your healthcare provider how much you should weigh  Ask him or her to help you create a weight loss plan if you are overweight  Weight loss can decrease your total cholesterol and triglyceride levels  · Exercise regularly  Exercise can help lower your total cholesterol level and maintain a healthy weight  Exercise can also help increase your HDL cholesterol level   Work with your healthcare provider to create an exercise program that is right for you  Get at least 30 minutes of moderate exercise most days of the week  Examples of exercise include brisk walking, swimming, or biking  · Do not smoke  Nicotine and other chemicals in cigarettes and cigars can damage your lungs, heart, and blood vessels  They can also raise your triglyceride levels  Ask your healthcare provider for information if you currently smoke and need help to quit  E-cigarettes or smokeless tobacco still contain nicotine  Talk to your healthcare provider before you use these products  · Limit or do not drink alcohol  Alcohol can increase your triglyceride levels  Ask your healthcare provider if it is safe for you to drink alcohol  Also ask how much is safe for you to drink each day  CARE AGREEMENT:   You have the right to help plan your care  Discuss treatment options with your caregivers to decide what care you want to receive  You always have the right to refuse treatment  The above information is an  only  It is not intended as medical advice for individual conditions or treatments  Talk to your doctor, nurse or pharmacist before following any medical regimen to see if it is safe and effective for you  © 2017 [employer] Information is for End User's use only and may not be sold, redistributed or otherwise used for commercial purposes  All illustrations and images included in CareNotes® are the copyrighted property of [employer]  or [employer]

## 2018-09-06 NOTE — ASSESSMENT & PLAN NOTE
Pt tried multiple NSAIDs without relief  Also did PT and chiropractor without benefit  Will update imaging  Will also give 60mg toradol and 4mg decadron IM to combat pain  Will give flexeril to use at bed for spasm and stiffness  Patient was informed that this medicine has an abuse potential and may be habit forming  We discussed that this may also cause drowsiness  The medication should not be combined with alcohol  The patient was informed not to operate machinery while taking this medication and should not drive until they know how they respond to the medication  The patient verbalized understanding of this

## 2018-09-07 DIAGNOSIS — M54.5 LOW BACK PAIN, UNSPECIFIED BACK PAIN LATERALITY, UNSPECIFIED CHRONICITY, WITH SCIATICA PRESENCE UNSPECIFIED: Primary | ICD-10-CM

## 2018-09-14 ENCOUNTER — APPOINTMENT (OUTPATIENT)
Dept: LAB | Facility: CLINIC | Age: 48
End: 2018-09-14
Payer: COMMERCIAL

## 2018-09-14 LAB
BUN SERPL-MCNC: 19 MG/DL (ref 5–25)
CREAT SERPL-MCNC: 1.09 MG/DL (ref 0.6–1.3)
GFR SERPL CREATININE-BSD FRML MDRD: 80 ML/MIN/1.73SQ M

## 2018-09-14 PROCEDURE — 36415 COLL VENOUS BLD VENIPUNCTURE: CPT

## 2018-09-14 PROCEDURE — 82565 ASSAY OF CREATININE: CPT

## 2018-09-14 PROCEDURE — 84520 ASSAY OF UREA NITROGEN: CPT

## 2018-09-21 ENCOUNTER — HOSPITAL ENCOUNTER (OUTPATIENT)
Dept: RADIOLOGY | Facility: HOSPITAL | Age: 48
Discharge: HOME | End: 2018-09-21
Payer: COMMERCIAL

## 2018-09-21 DIAGNOSIS — M48.061 SPINAL STENOSIS OF LUMBAR REGION, UNSPECIFIED WHETHER NEUROGENIC CLAUDICATION PRESENT: ICD-10-CM

## 2018-09-21 PROCEDURE — A9585 GADOBUTROL INJECTION: HCPCS | Performed by: RADIOLOGY

## 2018-09-21 PROCEDURE — 72158 MRI LUMBAR SPINE W/O & W/DYE: CPT

## 2018-09-21 RX ADMIN — GADOBUTROL 8 ML: 604.72 INJECTION INTRAVENOUS at 10:15

## 2018-10-22 ENCOUNTER — HOSPITAL ENCOUNTER (OUTPATIENT)
Dept: RADIOLOGY | Facility: HOSPITAL | Age: 48
Discharge: HOME | End: 2018-10-22
Attending: ORTHOPAEDIC SURGERY
Payer: COMMERCIAL

## 2018-10-22 ENCOUNTER — OFFICE VISIT (OUTPATIENT)
Dept: OBGYN CLINIC | Facility: HOSPITAL | Age: 48
End: 2018-10-22
Payer: COMMERCIAL

## 2018-10-22 VITALS
HEIGHT: 66 IN | WEIGHT: 177 LBS | HEART RATE: 93 BPM | DIASTOLIC BLOOD PRESSURE: 79 MMHG | SYSTOLIC BLOOD PRESSURE: 123 MMHG | BODY MASS INDEX: 28.45 KG/M2

## 2018-10-22 DIAGNOSIS — M48.061 SPINAL STENOSIS OF LUMBAR REGION, UNSPECIFIED WHETHER NEUROGENIC CLAUDICATION PRESENT: ICD-10-CM

## 2018-10-22 DIAGNOSIS — M48.061 SPINAL STENOSIS OF LUMBAR REGION, UNSPECIFIED WHETHER NEUROGENIC CLAUDICATION PRESENT: Primary | ICD-10-CM

## 2018-10-22 DIAGNOSIS — M51.36 DDD (DEGENERATIVE DISC DISEASE), LUMBAR: ICD-10-CM

## 2018-10-22 PROBLEM — M43.16 SPONDYLOLISTHESIS OF LUMBAR REGION: Status: ACTIVE | Noted: 2018-10-22

## 2018-10-22 PROBLEM — M51.369 DDD (DEGENERATIVE DISC DISEASE), LUMBAR: Status: ACTIVE | Noted: 2018-10-22

## 2018-10-22 PROCEDURE — 72110 X-RAY EXAM L-2 SPINE 4/>VWS: CPT

## 2018-10-22 PROCEDURE — 99203 OFFICE O/P NEW LOW 30 MIN: CPT | Performed by: ORTHOPAEDIC SURGERY

## 2018-10-22 NOTE — PROGRESS NOTES
Assessment/Plan:      Patient seen and examined with Dr Linda Kay  He presents with worsening chronic lower back pain without significant radiation distally to the lower extremities  Recent MRI of the lumbar spine reveals Grade 2 spondylolisthesis at L4-5, which is confirmed on today's x-rays  He has transitional vertebrae at this level  Our recommendation is to start conservatively with injections at pain management  He may be a candidate for lumbar fusion procedure in the future if no relief with conservative management  Follow-up in 6-8 weeks for re-evaluation  Problem List Items Addressed This Visit     Spinal stenosis of lumbar region - Primary    Relevant Orders    XR spine lumbar minimum 4 views non injury    DDD (degenerative disc disease), lumbar    Relevant Orders    XR spine lumbar minimum 4 views non injury            Subjective:      Patient ID: Callum Holguin is a 50 y o  male  HPI     Patient is a 51-year-old male who presents for initial evaluation with Dr Linda Kay  He states he has a longstanding history of chronic low back pain going back 20 years ago but has recently flared up over the last two months without any significant injury  Pain it is located midline transverse lower back without radiation distally to lower extremities  No lower extremity weakness numbness and tingling no bowel or bladder  Incontinence  He has done physical therapy and chiropractic care for this with some improvement in the past   He has never had injections  Pain is made worse with activity and better with leaning forward  He states he has had no surgery on his lower back  The following portions of the patient's history were reviewed and updated as appropriate: allergies, current medications, past family history, past medical history, past social history, past surgical history and problem list     Review of Systems   Constitutional: Negative for chills, diaphoresis, fatigue and fever  Respiratory: Negative for shortness of breath and wheezing  Cardiovascular: Negative  Genitourinary: Negative for decreased urine volume and difficulty urinating  Musculoskeletal: Positive for arthralgias and back pain  Negative for gait problem  Skin: Negative  Neurological: Negative for weakness and numbness  Objective:      /79   Pulse 93   Ht 5' 6" (1 676 m)   Wt 80 3 kg (177 lb)   BMI 28 57 kg/m²          Physical Exam   Constitutional: He is oriented to person, place, and time  He appears well-developed and well-nourished  No distress  HENT:   Head: Normocephalic and atraumatic  Pulmonary/Chest: Effort normal  No respiratory distress  Neurological: He is alert and oriented to person, place, and time  Skin: Skin is warm and dry  He is not diaphoretic  Psychiatric: He has a normal mood and affect  Nursing note and vitals reviewed  No acute distress  Gait is normal   Inspection no open wounds or erythema  Lumbosacral region is nontender to palpation  Negative modified straight leg raise bilaterally  Strength is 5/5 L2-S1 bilaterally, sensation is equal and intact    Hyperactive reflexes at L4 and S1 symmetrically  Negative Obrien sign  Absent Babinski

## 2018-11-05 ENCOUNTER — CLINICAL SUPPORT (OUTPATIENT)
Dept: PAIN MEDICINE | Facility: CLINIC | Age: 48
End: 2018-11-05
Payer: COMMERCIAL

## 2018-11-05 VITALS
DIASTOLIC BLOOD PRESSURE: 81 MMHG | SYSTOLIC BLOOD PRESSURE: 123 MMHG | BODY MASS INDEX: 27.97 KG/M2 | TEMPERATURE: 98.6 F | HEART RATE: 81 BPM | HEIGHT: 66 IN | WEIGHT: 174 LBS

## 2018-11-05 DIAGNOSIS — M51.36 DDD (DEGENERATIVE DISC DISEASE), LUMBAR: ICD-10-CM

## 2018-11-05 DIAGNOSIS — M43.16 SPONDYLOLISTHESIS OF LUMBAR REGION: ICD-10-CM

## 2018-11-05 DIAGNOSIS — M48.061 SPINAL STENOSIS OF LUMBAR REGION, UNSPECIFIED WHETHER NEUROGENIC CLAUDICATION PRESENT: ICD-10-CM

## 2018-11-05 DIAGNOSIS — M43.06 LUMBAR SPONDYLOLYSIS: ICD-10-CM

## 2018-11-05 DIAGNOSIS — M47.816 LUMBAR SPONDYLOSIS: Primary | ICD-10-CM

## 2018-11-05 PROCEDURE — 99244 OFF/OP CNSLTJ NEW/EST MOD 40: CPT | Performed by: ANESTHESIOLOGY

## 2018-11-05 NOTE — PROGRESS NOTES
Assessment:  1  Lumbar spondylosis    2  Spinal stenosis of lumbar region, unspecified whether neurogenic claudication present    3  DDD (degenerative disc disease), lumbar    4  Spondylolisthesis of lumbar region    5  Lumbar spondylolysis        Plan:  69-year-old male presenting for initial consultation regarding a long-standing history of lumbosacral back pain  The patient did initially have radicular symptoms in the L5 distribution of bilateral lower extremities 10 years ago, however he has not experienced any the symptoms since  MRI of the lumbar spine reveals degenerative disc disease at L4-5 with spondylosis from L3-4 to L5-S1  There is also transition while anatomy at L5-S1  The patient does have spondylolysis at L4 with grade 2 spondylolisthesis of L4 on L5  There is significant bilateral foraminal stenosis noted at this level  The patient has done physical therapy without any significant relief  He has been evaluated by Dr Mare Sinha regarding surgical consultation  Dr Mare Sinha does not feel that surgical intervention is warranted at this time and has kindly refer the patient for further evaluation and treatment  The patient does take ibuprofen p r n  With mild relief  The patient does exercise often, however the back pain is made it difficult to continue his exercise routine  The patient's low back pain seems to be multifactorial including myofascial and facet mediated components as well as a component secondary to his listhesis  He does not have any evidence of radiculopathy or myelopathy on physical exam today  1  I will schedule the patient for bilateral L3, L4, and L5 dorsal rami medial branch blocks  If the patient has a favorable result x2 we could proceed with RFA for longer lasting relief  The diagnostic nature of these blocks were discussed with the patient in detail using diagrams and models  The patient wished to proceed    2  The patient will continue his home exercise program  3  The patient will continue with ibuprofen p r n  Should not exceed more than 800 mg q 8 hours p r n   4  If the patient fails lumbar medial branch blocks we may consider bilateral L4 TFESI  5  I will follow up the patient pending results of medial branch blocks    Complete risks and benefits including bleeding, infection, tissue reaction, nerve injury and allergic reaction were discussed  The approach was demonstrated using models and literature was provided  Verbal and written consent was obtained  My impressions and treatment recommendations were discussed in detail with the patient who verbalized understanding and had no further questions  Discharge instructions were provided  I personally saw and examined the patient and I agree with the above discussed plan of care  No orders of the defined types were placed in this encounter  No orders of the defined types were placed in this encounter  History of Present Illness:    Nghia Landers is a 50 y o  male presenting for initial consultation regarding a chronic long-standing history of lumbosacral back pain  The patient did initially have pain radiating in the lateral aspects of bilateral lower extremities 10 years ago, however he has not noticed any leg symptoms since  He denies any bladder or bowel incontinence or saddle anesthesia  He denies any numbness, paresthesias, or subjective weakness of his legs  He denies any trauma or inciting event  MRI of the lumbar spine reveals degenerative disc disease at L4-5 with spondylosis from L3-4 to L5-S1  There is also transition while anatomy at L5-S1  The patient does have spondylolysis at L4 with grade 2 spondylolisthesis of L4 on L5  There is significant bilateral foraminal stenosis noted at this level  The patient has done physical therapy without any significant relief  He has been evaluated by Dr Letty Carbajal regarding surgical consultation    Dr Letty Carbajal does not feel that surgical intervention is warranted at this time and has kindly refer the patient for further evaluation and treatment  The patient does take ibuprofen p r n  With mild relief  The patient does exercise often, however the back pain is made it difficult to continue his exercise routine  The patient rates his pain 8/10 on the pain is intermittent  The pain is worse in the morning  The pain is described as cramping, shooting, and sharp  The pain is increased with lying supine, bending, exercise, and coughing/sneezing  He has gotten excellent relief with exercise  He gets moderate relief with chiropractic treatment  I have personally reviewed and/or updated the patient's past medical history, past surgical history, family history, social history, current medications, allergies, and vital signs today  Other than as stated above, the patient denies any interval changes in medications, medical condition, mental condition, symptoms, or allergies since the last office visit  Review of Systems:    Review of Systems   Constitutional: Negative for fever and unexpected weight change  HENT: Negative for trouble swallowing  Eyes: Negative for visual disturbance  Respiratory: Negative for shortness of breath and wheezing  Cardiovascular: Negative for chest pain and palpitations  Gastrointestinal: Negative for constipation, diarrhea, nausea and vomiting  Endocrine: Negative for cold intolerance, heat intolerance and polydipsia  Genitourinary: Negative for difficulty urinating and frequency  Musculoskeletal: Negative for arthralgias, gait problem, joint swelling and myalgias  Skin: Negative for rash  Neurological: Negative for dizziness, seizures, syncope, weakness and headaches  Hematological: Does not bruise/bleed easily  Psychiatric/Behavioral: Negative for dysphoric mood  Depression   All other systems reviewed and are negative        Patient Active Problem List   Diagnosis    Pure hypercholesterolemia    ADHD (attention deficit hyperactivity disorder), combined type    Depression    Testicular hypogonadism    Spinal stenosis of lumbar region    Ear lump, left    DDD (degenerative disc disease), lumbar    Spondylolisthesis of lumbar region       Past Medical History:   Diagnosis Date    ADD (attention deficit disorder)     Anxiety     DDD (degenerative disc disease), lumbar 10/22/2018    Depression     ED (erectile dysfunction)     Hyperlipidemia        Past Surgical History:   Procedure Laterality Date    ELBOW SURGERY      NASAL SEPTUM SURGERY      VASECTOMY         Family History   Problem Relation Age of Onset    No Known Problems Mother     No Known Problems Father        Social History     Occupational History    Marketing      Social History Main Topics    Smoking status: Never Smoker    Smokeless tobacco: Never Used    Alcohol use Yes      Comment: Social    Drug use: No    Sexual activity: Not on file       Current Outpatient Prescriptions on File Prior to Visit   Medication Sig    FLUoxetine (PROzac) 20 MG tablet Take 1 tablet (20 mg total) by mouth daily    [DISCONTINUED] amphetamine-dextroamphetamine (ADDERALL XR) 20 MG 24 hr capsule Take 1 capsule (20 mg total) by mouth every morning Max Daily Amount: 20 mg    [DISCONTINUED] atoMOXetine (STRATTERA) 40 mg capsule Take 1 capsule (40 mg total) by mouth 2 (two) times a day    [DISCONTINUED] cyclobenzaprine (FLEXERIL) 10 mg tablet Take 1 tablet (10 mg total) by mouth 3 (three) times a day as needed for muscle spasms    [DISCONTINUED] ezetimibe (ZETIA) 10 mg tablet Take 1 tablet (10 mg total) by mouth daily    [DISCONTINUED] tadalafil (CIALIS) 20 MG tablet Cialis 20 mg tablet    [DISCONTINUED] Testosterone 30 MG/ACT SOLN 4 Act (120 mg total) by Pump route 4 (four) times a day     No current facility-administered medications on file prior to visit          Allergies   Allergen Reactions    [facility] Tape] Rash       Physical Exam:    /81   Pulse 81   Temp 98 6 °F (37 °C) (Oral)   Ht 5' 6" (1 676 m)   Wt 78 9 kg (174 lb)   BMI 28 08 kg/m²     Constitutional: normal, well developed, well nourished, alert, in no distress and non-toxic and no overt pain behavior  Eyes: anicteric  HEENT: grossly intact  Neck: supple, symmetric, trachea midline and no masses   Pulmonary:even and unlabored  Cardiovascular:No edema or pitting edema present  Skin:Normal without rashes or lesions and well hydrated  Psychiatric:Mood and affect appropriate  Neurologic:Cranial Nerves II-XII grossly intact  Musculoskeletal:normal gait  Bilateral lumbar paraspinals tender to palpation ropy in texture from L3-L5  Bilateral SI joints nontender to palpation  Bilateral greater trochanters nontender to palpation  Slightly limited range of motion with extension of lumbar spine  Bilateral patellar and Achilles reflexes were 3/4  Clonus noted bilaterally 2 beats  Bilateral lower extremity strength 5/5 in all muscle groups  Sensation intact to light touch in the L2 through S1 dermatomes bilaterally  Negative straight leg raise bilaterally  Negative Norberto's test bilaterally  Imaging        PACS Images     Show images for MRI lumbar spine w wo contrast   Order Report      Order Details   Order Questions     Question Answer Comment   What is the patient's sedation requirement? No Sedation    Metallic implants? No    Note:  Answer Yes or No          Reason For Exam     Spinal stenosis, lumbar   Dx: Spinal stenosis of lumbar region, unspecified whether neurogenic claudication present [M48 061 (ICD-10-CM)]   Study Result     MRI LUMBAR SPINE WITH AND WITHOUT CONTRAST     INDICATION: M48 061: Spinal stenosis, lumbar region without neurogenic claudication      COMPARISON:  None      TECHNIQUE:  Sagittal T1, sagittal T2, sagittal inversion recovery, axial T1 and axial T2, coronal T2    Sagittal and axial T1 postcontrast      IV Contrast:  8 mL of gadobutrol injection (MULTI-DOSE)      IMAGE QUALITY:  Diagnostic     FINDINGS: Transitional lumbosacral vertebral body is considered a sacralized L5 segment      ALIGNMENT:  Chronic bilateral L4 spondylolysis with grade 2 anterolisthesis, collapse of the disc space with circumferential bulging of the discs      MARROW SIGNAL:  Although reactive changes at the L4-5 level are largely chronic, there are some minor peripheral elements of edema within the opposing bodies which would indicate evolving degenerative changes      DISTAL CORD AND CONUS:  Normal size and signal of the distal cord and conus  The conus ends at the L1 level      PARASPINAL SOFT TISSUES:  Paraspinal soft tissues are unremarkable      SACRUM:  Normal signal within the sacrum  No evidence of insufficiency or stress fracture      LOWER THORACIC DISC SPACES:  Normal disc height and signal   No disc herniation, canal stenosis or foraminal narrowing      LUMBAR DISC SPACES:     L1-L2:  Normal      L2-L3:  Normal      L3-L4:  Minor facet arthrosis     L4-L5: chronic bilateral L4 spondylolysis, grade 2 spondylolisthesis  Collapse of the disc height, reactive endplate changes, critical stenosis both foramen, correlate for bilateral L4 radiculitis      L5-S1:  Rudimentary disc space      POSTCONTRAST IMAGING:  No abnormal enhancement      IMPRESSION:     Transitional lumbosacral segment considered L5  This could be confirmed with plain films of the thoracic and lumbar spine     Chronic bilateral L4 pars defect with grade 2 degenerative anterolisthesis and critical bilateral foraminal stenosis    Correlate for bilateral L4 radiculitis         Workstation performed: [id number]      Imaging     MRI lumbar spine w wo contrast (Order #[id number]) on 9/21/2018 - Imaging Information   Result History     MRI lumbar spine w wo contrast (Order #[id number]) on 9/22/2018 - Order Result History Report    Show result history   Result Comparison   MRI lumbar spine w wo contrast (Order [id number])   Newer Version Older Version   Final result    9/22/2018  8:37 AM    Interface, Radiology Results In         This is the newest version No older versions exist   Narrative     MRI LUMBAR SPINE WITH AND WITHOUT CONTRAST     INDICATION: M48 061: Spinal stenosis, lumbar region without neurogenic claudication  COMPARISON:  None  TECHNIQUE:  Sagittal T1, sagittal T2, sagittal inversion recovery, axial T1 and axial T2, coronal T2   Sagittal and axial T1 postcontrast      IV Contrast:  8 mL of gadobutrol injection (MULTI-DOSE)      IMAGE QUALITY:  Diagnostic     FINDINGS: Transitional lumbosacral vertebral body is considered a sacralized L5 segment  ALIGNMENT:  Chronic bilateral L4 spondylolysis with grade 2 anterolisthesis, collapse of the disc space with circumferential bulging of the discs  MARROW SIGNAL:  Although reactive changes at the L4-5 level are largely chronic, there are some minor peripheral elements of edema within the opposing bodies which would indicate evolving degenerative changes  DISTAL CORD AND CONUS:  Normal size and signal of the distal cord and conus   The conus ends at the L1 level  PARASPINAL SOFT TISSUES:  Paraspinal soft tissues are unremarkable  SACRUM:  Normal signal within the sacrum  No evidence of insufficiency or stress fracture  LOWER THORACIC DISC SPACES:  Normal disc height and signal   No disc herniation, canal stenosis or foraminal narrowing  LUMBAR DISC SPACES:     L1-L2:  Normal      L2-L3:  Normal      L3-L4:  Minor facet arthrosis     L4-L5: chronic bilateral L4 spondylolysis, grade 2 spondylolisthesis   Collapse of the disc height, reactive endplate changes, critical stenosis both foramen, correlate for bilateral L4 radiculitis  L5-S1:  Rudimentary disc space  POSTCONTRAST IMAGING:  No abnormal enhancement        Impression       Transitional lumbosacral segment considered L5   This could be confirmed with plain films of the thoracic and lumbar spine     Chronic bilateral L4 pars defect with grade 2 degenerative anterolisthesis and critical bilateral foraminal stenosis   Correlate for bilateral L4 radiculitis  Workstation performed: [id number]          PACS Images     Show images for XR spine lumbar minimum 4 views non injury   Order Report      Order Details   Order Questions     Question Answer Comment   Exam reason W/FLEX, EX    Note:  Enter reason for exam          Reason For Exam     W/FLEX, EX   Dx: Spinal stenosis of lumbar region, unspecified whether neurogenic claudication present [M48 061 (ICD-10-CM)]; DDD (degenerative disc disease), lumbar [M51 36 (ICD-10-CM)]   Study Result     LUMBAR SPINE     INDICATION:   M48 061: Spinal stenosis, lumbar region without neurogenic claudication  M51 36: Other intervertebral disc degeneration, lumbar region  Low back pain     COMPARISON:  None     VIEWS:  XR SPINE LUMBAR MINIMUM 4 VIEWS NON INJURY Lateral projections in neutral, flexion and extension        FINDINGS:     There is no evidence of acute fracture or destructive osseous lesion      Grade II spondylolithesis L4 on L5  L5 is a transitional vertebrae  The level is consistent with the level described on the MRI from September 21, 2018    Imaging of the thoracic and lumbar spine would be required for confirmation      Degenerative changes at the L4-5 disc space with osteophyte formation      There is no subluxation and alignment is stable in flexion, extension, and neutral positioning      Soft tissues are unremarkable      IMPRESSION:     Grade 2 spondylolisthesis L4 on 5 without instability              Workstation performed: [id number]      Imaging     XR spine lumbar minimum 4 views non injury (Order #[id number]) on 10/22/2018 - Imaging Information   Result History     XR spine lumbar minimum 4 views non injury (Order #[id number]) on 10/25/2018 - Order Result History Report    Show result history   Result Comparison   XR spine lumbar minimum 4 views non injury (Order [id number])   Newer Version Older Version   Final result    10/25/2018  2:37 PM    Interface, Radiology Results In         This is the newest version No older versions exist   Narrative     LUMBAR SPINE     INDICATION:   M48 061: Spinal stenosis, lumbar region without neurogenic claudication   M51 36: Other intervertebral disc degeneration, lumbar region   Low back pain     COMPARISON:  None     VIEWS:  XR SPINE LUMBAR MINIMUM 4 VIEWS NON INJURY Lateral projections in neutral, flexion and extension       FINDINGS:     There is no evidence of acute fracture or destructive osseous lesion  Grade II spondylolithesis L4 on L5   L5 is a transitional vertebrae   The level is consistent with the level described on the MRI from September 21, 2018   Imaging of the thoracic and lumbar spine would be required for confirmation  Degenerative changes at the L4-5 disc space with osteophyte formation  There is no subluxation and alignment is stable in flexion, extension, and neutral positioning  Soft tissues are unremarkable        Impression       Grade 2 spondylolisthesis L4 on 5 without instability           Workstation performed: [id number]

## 2018-11-19 ENCOUNTER — TELEPHONE (OUTPATIENT)
Dept: RADIOLOGY | Facility: CLINIC | Age: 48
End: 2018-11-19

## 2018-11-19 NOTE — TELEPHONE ENCOUNTER
Pt called, is scheduled for Bilateral L3, L4, L5 MBB#1- pt  cancelled on him and he really want to have procedure done  Would it be OK for pt to drive self after waiting in our office extended amount of time? He is willing to sign AMA form

## 2018-11-20 ENCOUNTER — HOSPITAL ENCOUNTER (OUTPATIENT)
Dept: RADIOLOGY | Facility: CLINIC | Age: 48
Discharge: HOME | End: 2018-11-20
Attending: ANESTHESIOLOGY
Payer: COMMERCIAL

## 2018-11-20 VITALS
RESPIRATION RATE: 18 BRPM | OXYGEN SATURATION: 98 % | TEMPERATURE: 98.1 F | HEART RATE: 72 BPM | DIASTOLIC BLOOD PRESSURE: 93 MMHG | SYSTOLIC BLOOD PRESSURE: 152 MMHG

## 2018-11-20 DIAGNOSIS — M47.816 LUMBAR SPONDYLOSIS: ICD-10-CM

## 2018-11-20 PROCEDURE — 64494 INJ PARAVERT F JNT L/S 2 LEV: CPT | Performed by: ANESTHESIOLOGY

## 2018-11-20 PROCEDURE — 64493 INJ PARAVERT F JNT L/S 1 LEV: CPT | Performed by: ANESTHESIOLOGY

## 2018-11-20 RX ORDER — ATOMOXETINE 40 MG/1
40 CAPSULE ORAL DAILY
COMMUNITY
End: 2019-02-04

## 2018-11-20 RX ORDER — LIDOCAINE HYDROCHLORIDE 10 MG/ML
10 INJECTION, SOLUTION EPIDURAL; INFILTRATION; INTRACAUDAL; PERINEURAL ONCE
Status: COMPLETED | OUTPATIENT
Start: 2018-11-20 | End: 2018-11-20

## 2018-11-20 RX ADMIN — LIDOCAINE HYDROCHLORIDE 9 ML: 10 INJECTION, SOLUTION EPIDURAL; INFILTRATION; INTRACAUDAL; PERINEURAL at 11:52

## 2018-11-20 RX ADMIN — LIDOCAINE HYDROCHLORIDE 3 ML: 20 INJECTION, SOLUTION EPIDURAL; INFILTRATION; INTRACAUDAL; PERINEURAL at 12:00

## 2018-11-20 NOTE — H&P
History of Present Illness: The patient is a 50 y o  male who presents with complaints of low back pain  Patient Active Problem List   Diagnosis    Pure hypercholesterolemia    ADHD (attention deficit hyperactivity disorder), combined type    Depression    Testicular hypogonadism    Spinal stenosis of lumbar region    Ear lump, left    DDD (degenerative disc disease), lumbar    Spondylolisthesis of lumbar region    Lumbar spondylolysis    Lumbar spondylosis       Past Medical History:   Diagnosis Date    ADD (attention deficit disorder)     Anxiety     DDD (degenerative disc disease), lumbar 10/22/2018    Depression     ED (erectile dysfunction)     Hyperlipidemia        Past Surgical History:   Procedure Laterality Date    ELBOW SURGERY      NASAL SEPTUM SURGERY      VASECTOMY           Current Outpatient Prescriptions:     atoMOXetine (STRATTERA) 40 mg capsule, Take 40 mg by mouth daily, Disp: , Rfl:     FLUoxetine (PROzac) 20 MG tablet, Take 1 tablet (20 mg total) by mouth daily, Disp: 90 tablet, Rfl: 3    Allergies   Allergen Reactions    Adhesive [Medical Tape] Rash       Physical Exam:   Vitals:    11/20/18 1113   BP: 133/87   Pulse: 82   Resp: 16   Temp: 98 1 °F (36 7 °C)   SpO2: 98%     General: Awake, Alert, Oriented x 3, Mood and affect appropriate  Respiratory: Respirations even and unlabored  Cardiovascular: Peripheral pulses intact; no edema  Musculoskeletal Exam:  Bilateral lumbar paraspinals tender to palpation  ASA Score: 2    Patient/Chart Verification  Patient ID Verified: Verbal  ID Band Applied: No  Consents Confirmed: Procedural, To be obtained in the Pre-Procedure area  H&P( within 30 days) Verified: To be obtained in the Pre-Procedure area  Interval H&P(within 24 hr) Complete (required for Outpatients and Surgery Admit only): To be obtained in the Pre-Procedure area  Allergies Reviewed: Yes  Anticoag/NSAID held?: No  Currently on antibiotics?: No    Assessment:   1  Lumbar spondylosis        Plan: lumbar spondylosis - Bilateral L3, L4, L5 MBB#1

## 2018-11-20 NOTE — DISCHARGE INSTR - LAB

## 2018-11-29 ENCOUNTER — TELEPHONE (OUTPATIENT)
Dept: OBGYN CLINIC | Facility: HOSPITAL | Age: 48
End: 2018-11-29

## 2018-11-29 NOTE — TELEPHONE ENCOUNTER
Patient is calling    [phone]      Patient had the fl spine injection done on 11/20/18  Patient is stating that he faxed his pain diary to you on 11/21/18 but he has not heard anything  Patient is reporting that his pain level on a scale of 1-10 is a 6 or 7, patient is stating that he doesn't seem to have any relief at this point  Patient is stating that he only had relief on the day of the injections & it was for a few hours  Patient is stating that his % of relief at this point 0%  Patient was only expecting the relief to last one day & patient is stating that this is what he expected  Please call the patient to discuss

## 2018-11-30 NOTE — TELEPHONE ENCOUNTER
The patient seems to have had a favorable result to medial branch block 1  Please call the patient to schedule bilateral L3, L4, L5 medial branch block 2

## 2018-11-30 NOTE — TELEPHONE ENCOUNTER
Please transfer call to [facility] if you have pt on phone, do not task      Called pt, LMOM for pt to cb to schedule- gave direct line and hours

## 2018-12-03 NOTE — TELEPHONE ENCOUNTER
Dr Kristin Baez- spoke to pt, he states that he is surprised you want to move forward w MBB#2 because he expected to have better relief, at least 50-80% for a longer duration of time  The top of his pain diary where he write "no pain while driving", he wants to clarify that he wouldn't normally have pain in that situation, so he only felt as though he got relief for a couple of hours  Can you please take a look at the pain diary (scanned in) and just confirm that he did get enough relief for us to move forward? I did tentatively schedule B/L L3, L4, L5 MBB#2 on Wed 12/12/18 arr at 08:30, will call pt to confirm we should keep or cancel appt

## 2018-12-04 NOTE — TELEPHONE ENCOUNTER
If the patient did not get enough relief, then we will hold off on the procedure and I will re-evaluate at next office visit

## 2018-12-05 NOTE — TELEPHONE ENCOUNTER
Called ptJAVON explaining that he can come for OV- there was an opening on Katies schedule tomorrow- added him on and told him I'm saving that spot for him since we don't normally have openings that soon  Asked pt to please call back my direct line ASAP and confirm if he's able to come for OV tomorrow or not- and if he'd like to keep the MBB#2 appt on Wed 12/12

## 2018-12-06 NOTE — TELEPHONE ENCOUNTER
Pt returned call, did not get message until this morning- unable to make follow up appt today, kept MBB#2 appt on Wed 12/12/18

## 2018-12-12 ENCOUNTER — HOSPITAL ENCOUNTER (OUTPATIENT)
Dept: RADIOLOGY | Facility: CLINIC | Age: 48
Discharge: HOME | End: 2018-12-12
Attending: ANESTHESIOLOGY
Payer: COMMERCIAL

## 2018-12-12 VITALS
OXYGEN SATURATION: 95 % | DIASTOLIC BLOOD PRESSURE: 82 MMHG | TEMPERATURE: 98.2 F | HEART RATE: 75 BPM | RESPIRATION RATE: 20 BRPM | SYSTOLIC BLOOD PRESSURE: 149 MMHG

## 2018-12-12 DIAGNOSIS — M47.816 LUMBAR SPONDYLOSIS: ICD-10-CM

## 2018-12-12 PROCEDURE — 64493 INJ PARAVERT F JNT L/S 1 LEV: CPT | Performed by: ANESTHESIOLOGY

## 2018-12-12 PROCEDURE — 64494 INJ PARAVERT F JNT L/S 2 LEV: CPT | Performed by: ANESTHESIOLOGY

## 2018-12-12 RX ORDER — BUPIVACAINE HYDROCHLORIDE 5 MG/ML
30 INJECTION, SOLUTION EPIDURAL; INTRACAUDAL ONCE
Status: COMPLETED | OUTPATIENT
Start: 2018-12-12 | End: 2018-12-12

## 2018-12-12 RX ORDER — LIDOCAINE HYDROCHLORIDE 10 MG/ML
10 INJECTION, SOLUTION EPIDURAL; INFILTRATION; INTRACAUDAL; PERINEURAL ONCE
Status: COMPLETED | OUTPATIENT
Start: 2018-12-12 | End: 2018-12-12

## 2018-12-12 RX ADMIN — LIDOCAINE HYDROCHLORIDE 6 ML: 10 INJECTION, SOLUTION EPIDURAL; INFILTRATION; INTRACAUDAL; PERINEURAL at 09:28

## 2018-12-12 RX ADMIN — BUPIVACAINE HYDROCHLORIDE 3 ML: 5 INJECTION, SOLUTION EPIDURAL; INTRACAUDAL at 09:35

## 2018-12-12 NOTE — DISCHARGE INSTR - LAB

## 2018-12-12 NOTE — H&P
History of Present Illness: The patient is a 50 y o  male who presents with complaints of low back pain  Patient Active Problem List   Diagnosis    Pure hypercholesterolemia    ADHD (attention deficit hyperactivity disorder), combined type    Depression    Testicular hypogonadism    Spinal stenosis of lumbar region    Ear lump, left    DDD (degenerative disc disease), lumbar    Spondylolisthesis of lumbar region    Lumbar spondylolysis    Lumbar spondylosis       Past Medical History:   Diagnosis Date    ADD (attention deficit disorder)     Anxiety     DDD (degenerative disc disease), lumbar 10/22/2018    Depression     ED (erectile dysfunction)     Hyperlipidemia        Past Surgical History:   Procedure Laterality Date    ELBOW SURGERY      NASAL SEPTUM SURGERY      VASECTOMY           Current Outpatient Prescriptions:     atoMOXetine (STRATTERA) 40 mg capsule, Take 40 mg by mouth daily, Disp: , Rfl:     FLUoxetine (PROzac) 20 MG tablet, Take 1 tablet (20 mg total) by mouth daily, Disp: 90 tablet, Rfl: 3    Allergies   Allergen Reactions    Adhesive [Medical Tape] Rash       Physical Exam:   Vitals:    12/12/18 0843   BP: 125/85   Pulse: 73   Resp: 18   Temp: 98 2 °F (36 8 °C)   SpO2: 95%     General: Awake, Alert, Oriented x 3, Mood and affect appropriate  Respiratory: Respirations even and unlabored  Cardiovascular: Peripheral pulses intact; no edema  Musculoskeletal Exam:  Bilateral lumbar paraspinals tender to palpation  ASA Score: 2    Patient/Chart Verification  Patient ID Verified: Verbal  ID Band Applied: No  Consents Confirmed: Procedural, To be obtained in the Pre-Procedure area  H&P( within 30 days) Verified: To be obtained in the Pre-Procedure area  Allergies Reviewed: Yes  Anticoag/NSAID held?: No  Currently on antibiotics?: No    Assessment:   1   Lumbar spondylosis        Plan: B/L L3, L4, L5 MBB#2

## 2018-12-17 DIAGNOSIS — R79.89 LOW TESTOSTERONE: ICD-10-CM

## 2018-12-17 RX ORDER — TESTOSTERONE 30 MG/1.5ML
4 SOLUTION TOPICAL 4 TIMES DAILY
Qty: 540 ML | Refills: 2 | Status: SHIPPED | OUTPATIENT
Start: 2018-12-17 | End: 2019-09-15 | Stop reason: SDUPTHER

## 2018-12-19 ENCOUNTER — TELEPHONE (OUTPATIENT)
Dept: RADIOLOGY | Facility: CLINIC | Age: 48
End: 2018-12-19

## 2018-12-19 NOTE — TELEPHONE ENCOUNTER
Called pt, cancelled appt on 12/26 for procedure and scheduled a follow up to discuss treatment options on Fri 01/04/19

## 2018-12-19 NOTE — TELEPHONE ENCOUNTER
Cancel RFA as the patient does not appear he had a good response to the 2nd medial branch block  Please schedule follow-up office visit for re-evaluation and further treatment strategy    Thank you

## 2018-12-19 NOTE — TELEPHONE ENCOUNTER
Pt tentatively scheduled his first RFA appt on Wed 12/26/18- pt faxed pain diary and is wondering what he should do, he didn't feel like he got great after the first MBB and even less relief after second MBB so he is reluctant to move forward w RFA

## 2019-01-04 ENCOUNTER — CLINICAL SUPPORT (OUTPATIENT)
Dept: PAIN MEDICINE | Facility: CLINIC | Age: 49
End: 2019-01-04
Payer: COMMERCIAL

## 2019-01-04 VITALS
BODY MASS INDEX: 28.28 KG/M2 | SYSTOLIC BLOOD PRESSURE: 127 MMHG | HEART RATE: 81 BPM | DIASTOLIC BLOOD PRESSURE: 82 MMHG | HEIGHT: 66 IN | TEMPERATURE: 98.6 F | WEIGHT: 176 LBS

## 2019-01-04 DIAGNOSIS — M51.36 DDD (DEGENERATIVE DISC DISEASE), LUMBAR: ICD-10-CM

## 2019-01-04 DIAGNOSIS — M47.816 LUMBAR SPONDYLOSIS: ICD-10-CM

## 2019-01-04 DIAGNOSIS — M43.06 LUMBAR SPONDYLOLYSIS: ICD-10-CM

## 2019-01-04 DIAGNOSIS — M43.16 SPONDYLOLISTHESIS OF LUMBAR REGION: ICD-10-CM

## 2019-01-04 DIAGNOSIS — M48.061 SPINAL STENOSIS OF LUMBAR REGION, UNSPECIFIED WHETHER NEUROGENIC CLAUDICATION PRESENT: Primary | ICD-10-CM

## 2019-01-04 PROCEDURE — 99214 OFFICE O/P EST MOD 30 MIN: CPT | Performed by: ANESTHESIOLOGY

## 2019-01-04 NOTE — PROGRESS NOTES
Assessment:  1  Spinal stenosis of lumbar region, unspecified whether neurogenic claudication present    2  Spondylolisthesis of lumbar region    3  Lumbar spondylosis    4  Lumbar spondylolysis    5  DDD (degenerative disc disease), lumbar        Plan:  66-year-old male returning for follow-up of lumbosacral back pain with a history of degenerative disc disease and spondylosis and grade 2 spondylolisthesis at L4-5 secondary to spondylolysis at L4 with significant foraminal stenosis at this level  The patient did initially have radicular symptoms in his lower extremities about 10 years ago, however has not experienced any of the symptoms since  The patient did have bilateral L3, L4, and L5 medial branch blocks which did not provide any significant relief  He has been taking Tylenol and Advil p r n  With minimal to mild relief  The patient does not really like taking medications for pain unless absolutely necessary  Although the patient may have a facet mediated component to his low back pain he did not respond to medial branch blocks  The patient may have a neuropathic component secondary to critical bilateral foraminal stenosis, however he does not have any radicular symptoms in his lower extremities  He may have a neuropathic component to his low back pain secondary to the stenosis  He does not really have a myofascial component noted on physical exam as his lumbar paraspinal musculature and quadratus lumborum muscles were nontender to palpation  1  I will schedule the patient for an L4-5 LESI to reduce the inflammatory component of his pain  2  The patient may continue with ibuprofen and Tylenol p r n   3  The patient will continue with his home exercise program  4  I will follow up the patient in 2 months    Complete risks and benefits including bleeding, infection, tissue reaction, nerve injury and allergic reaction were discussed   The approach was demonstrated using models and literature was provided  Verbal and written consent was obtained  My impressions and treatment recommendations were discussed in detail with the patient who verbalized understanding and had no further questions  Discharge instructions were provided  I personally saw and examined the patient and I agree with the above discussed plan of care  No orders of the defined types were placed in this encounter  No orders of the defined types were placed in this encounter  History of Present Illness:    Flo Kaiser is a 52 y o  male returning for follow-up of lumbosacral back pain with a history of degenerative disc disease and spondylosis and grade 2 spondylolisthesis at L4-5 secondary to spondylolysis at L4 with significant foraminal stenosis at this level  The patient did initially have radicular symptoms in his lower extremities about 10 years ago, however has not experienced any of the symptoms since  He denies any bladder or bowel incontinence or saddle anesthesia  The patient did have bilateral L3, L4, and L5 medial branch blocks which did not provide any significant relief  He has been taking Tylenol and Advil p r n  With minimal to mild relief  The patient does not really like taking medications for pain unless absolutely necessary  The patient rates his pain a 3 to 8/10 depending upon what he is doing  The pain is worse in the morning and occasional   The pain is described as sharp and cramping  The pain is worse with lifting, bending, exercise, and running  The pain is alleviated with sitting, relaxation, and lying down  I have personally reviewed and/or updated the patient's past medical history, past surgical history, family history, social history, current medications, allergies, and vital signs today  Other than as stated above, the patient denies any interval changes in medications, medical condition, mental condition, symptoms, or allergies since the last office visit          Review of Systems:    Review of Systems   Constitutional: Negative for fever and unexpected weight change  HENT: Negative for trouble swallowing  Eyes: Negative for visual disturbance  Respiratory: Negative for shortness of breath and wheezing  Cardiovascular: Negative for chest pain and palpitations  Gastrointestinal: Negative for constipation, diarrhea, nausea and vomiting  Endocrine: Negative for cold intolerance, heat intolerance and polydipsia  Genitourinary: Negative for difficulty urinating and frequency  Musculoskeletal: Positive for gait problem and joint swelling  Negative for arthralgias and myalgias  Decreased ROM   Skin: Negative for rash  Neurological: Negative for dizziness, seizures, syncope, weakness and headaches  Hematological: Does not bruise/bleed easily  Psychiatric/Behavioral: Negative for dysphoric mood  All other systems reviewed and are negative  Patient Active Problem List   Diagnosis    Pure hypercholesterolemia    ADHD (attention deficit hyperactivity disorder), combined type    Depression    Testicular hypogonadism    Spinal stenosis of lumbar region    Ear lump, left    DDD (degenerative disc disease), lumbar    Spondylolisthesis of lumbar region    Lumbar spondylolysis    Lumbar spondylosis       Past Medical History:   Diagnosis Date    ADD (attention deficit disorder)     Anxiety     DDD (degenerative disc disease), lumbar 10/22/2018    Depression     ED (erectile dysfunction)     Hyperlipidemia        Past Surgical History:   Procedure Laterality Date    ELBOW SURGERY      NASAL SEPTUM SURGERY      VASECTOMY         Family History   Problem Relation Age of Onset    No Known Problems Mother     No Known Problems Father        Social History     Occupational History    Marketing      Social History Main Topics    Smoking status: Never Smoker    Smokeless tobacco: Never Used    Alcohol use Yes      Comment: Social    Drug use:  No  Sexual activity: Not on file       Current Outpatient Prescriptions on File Prior to Visit   Medication Sig    atoMOXetine (STRATTERA) 40 mg capsule Take 40 mg by mouth daily    FLUoxetine (PROzac) 20 MG tablet Take 1 tablet (20 mg total) by mouth daily    Testosterone 30 MG/ACT SOLN 4 ACT (120 MG TOTAL) BY PUMP ROUTE 4 (FOUR) TIMES A DAY     No current facility-administered medications on file prior to visit  Allergies   Allergen Reactions    Adhesive [Medical Tape] Rash       Physical Exam:    /82   Pulse 81   Temp 98 6 °F (37 °C) (Oral)   Ht 5' 6" (1 676 m)   Wt 79 8 kg (176 lb)   BMI 28 41 kg/m²     Constitutional: normal, well developed, well nourished, alert, in no distress and non-toxic and no overt pain behavior  Eyes: anicteric  HEENT: grossly intact  Neck: supple, symmetric, trachea midline and no masses   Pulmonary:even and unlabored  Cardiovascular:No edema or pitting edema present  Skin:Normal without rashes or lesions and well hydrated  Psychiatric:Mood and affect appropriate  Neurologic:Cranial Nerves II-XII grossly intact  Musculoskeletal:normal gait  Bilateral lumbar paraspinals nontender to palpation  Bilateral SI joints nontender to palpation  Bilateral lower extremity strength 5/5 in all muscle groups  Sensation intact to light touch in the L3 thru S1 dermatomes bilaterally  Negative seated straight leg raise bilaterally      Imaging  Imaging reviewed

## 2019-01-15 ENCOUNTER — OFFICE VISIT (OUTPATIENT)
Dept: URGENT CARE | Facility: CLINIC | Age: 49
End: 2019-01-15
Payer: COMMERCIAL

## 2019-01-15 VITALS
HEART RATE: 102 BPM | OXYGEN SATURATION: 93 % | BODY MASS INDEX: 27.97 KG/M2 | TEMPERATURE: 99.7 F | WEIGHT: 174 LBS | RESPIRATION RATE: 18 BRPM | SYSTOLIC BLOOD PRESSURE: 135 MMHG | HEIGHT: 66 IN | DIASTOLIC BLOOD PRESSURE: 80 MMHG

## 2019-01-15 DIAGNOSIS — J11.1 INFLUENZA: Primary | ICD-10-CM

## 2019-01-15 PROCEDURE — G0382 LEV 3 HOSP TYPE B ED VISIT: HCPCS | Performed by: NURSE PRACTITIONER

## 2019-01-15 PROCEDURE — S9083 URGENT CARE CENTER GLOBAL: HCPCS | Performed by: NURSE PRACTITIONER

## 2019-01-15 RX ORDER — DEXTROMETHORPHAN HYDROBROMIDE AND PROMETHAZINE HYDROCHLORIDE 15; 6.25 MG/5ML; MG/5ML
5 SYRUP ORAL 4 TIMES DAILY PRN
Qty: 240 ML | Refills: 0 | Status: SHIPPED | OUTPATIENT
Start: 2019-01-15 | End: 2019-09-04 | Stop reason: ALTCHOICE

## 2019-01-15 RX ORDER — PREDNISONE 20 MG/1
20 TABLET ORAL 2 TIMES DAILY WITH MEALS
Qty: 10 TABLET | Refills: 0 | Status: SHIPPED | OUTPATIENT
Start: 2019-01-15 | End: 2019-01-20

## 2019-01-15 NOTE — PATIENT INSTRUCTIONS
Increase fluids and rest  OTC cough/cold medications  Discussed viral vs bacterial infection  work note given  Return if symptoms worsen or for problems/concerns  Prednisone and Promethazine DM as directed

## 2019-01-15 NOTE — PROGRESS NOTES
[facility]        NAME: Cinthia Reyna is a 52 y o  male  : 1970    MRN: [id number]  DATE: January 15, 2019  TIME: 6:17 PM    Assessment and Plan   Influenza [J11 1]  1  Influenza  promethazine-dextromethorphan (PHENERGAN-DM) 6 25-15 mg/5 mL oral syrup    predniSONE 20 mg tablet         Patient Instructions     Patient Instructions   Increase fluids and rest  OTC cough/cold medications  Discussed viral vs bacterial infection  work note given  Return if symptoms worsen or for problems/concerns  Prednisone and Promethazine DM as directed    Follow up with PCP in 3-5 days  Proceed to  ER if symptoms worsen  Chief Complaint     Chief Complaint   Patient presents with    Cold Like Symptoms     cough, chest congestion and head ache x 2 days          History of Present Illness       Cough, chest congestion, headache, fever x 2 days        Review of Systems   Review of Systems   Constitutional: Positive for activity change, fatigue and fever  Negative for chills  HENT: Positive for congestion, postnasal drip, rhinorrhea and sinus pressure  Negative for ear pain and sore throat  Eyes: Negative for pain, discharge and redness  Respiratory: Positive for cough  Negative for wheezing  Cardiovascular: Negative for chest pain  Gastrointestinal: Negative for constipation, diarrhea, nausea and vomiting  Musculoskeletal: Negative for myalgias  Skin: Negative for rash  Neurological: Positive for headaches  Negative for dizziness           Current Medications       Current Outpatient Prescriptions:     atoMOXetine (STRATTERA) 40 mg capsule, Take 40 mg by mouth daily, Disp: , Rfl:     FLUoxetine (PROzac) 20 MG tablet, Take 1 tablet (20 mg total) by mouth daily, Disp: 90 tablet, Rfl: 3    Testosterone 30 MG/ACT SOLN, 4 ACT (120 MG TOTAL) BY PUMP ROUTE 4 (FOUR) TIMES A DAY, Disp: 540 mL, Rfl: 2    predniSONE 20 mg tablet, Take 1 tablet (20 mg total) by mouth 2 (two) times a day with meals for 5 days, Disp: 10 tablet, Rfl: 0    promethazine-dextromethorphan (PHENERGAN-DM) 6 25-15 mg/5 mL oral syrup, Take 5 mL by mouth 4 (four) times a day as needed for cough, Disp: 240 mL, Rfl: 0    Current Allergies     Allergies as of 01/15/2019 - Reviewed 01/15/2019   Allergen Reaction Noted    Adhesive [medical tape] Rash 04/11/2018            The following portions of the patient's history were reviewed and updated as appropriate: allergies, current medications, past family history, past medical history, past social history, past surgical history and problem list      Past Medical History:   Diagnosis Date    ADD (attention deficit disorder)     Anxiety     DDD (degenerative disc disease), lumbar 10/22/2018    Depression     ED (erectile dysfunction)     Hyperlipidemia        Past Surgical History:   Procedure Laterality Date    ELBOW SURGERY      NASAL SEPTUM SURGERY      VASECTOMY         Family History   Problem Relation Age of Onset    No Known Problems Mother     No Known Problems Father          Medications have been verified  Objective   /80   Pulse 102   Temp 99 7 °F (37 6 °C)   Resp 18   Ht 5' 6" (1 676 m)   Wt 78 9 kg (174 lb)   SpO2 93%   BMI 28 08 kg/m²        Physical Exam     Physical Exam   Constitutional: He is oriented to person, place, and time  He appears well-developed and well-nourished  He has a sickly appearance  No distress  HENT:   Head: Normocephalic and atraumatic  Right Ear: Tympanic membrane, external ear and ear canal normal    Left Ear: Tympanic membrane, external ear and ear canal normal    Nose: Rhinorrhea present  No epistaxis  Right sinus exhibits no maxillary sinus tenderness and no frontal sinus tenderness  Left sinus exhibits no maxillary sinus tenderness and no frontal sinus tenderness     Mouth/Throat: Uvula is midline, oropharynx is clear and moist and mucous membranes are normal        Cardiovascular: Normal rate, regular rhythm and normal heart sounds  Exam reveals no gallop and no friction rub  No murmur heard  Pulmonary/Chest: Effort normal and breath sounds normal  No respiratory distress (persistent dry cough noted in room)  He has no wheezes  He has no rales  Abdominal: He exhibits no distension  There is no tenderness  Lymphadenopathy:        Head (right side): No submandibular adenopathy present  Head (left side): No submandibular adenopathy present  Neurological: He is alert and oriented to person, place, and time  Skin: He is not diaphoretic  Psychiatric: He has a normal mood and affect  His behavior is normal  Judgment and thought content normal    Nursing note and vitals reviewed

## 2019-01-21 DIAGNOSIS — B00.9 HERPES SIMPLEX: Primary | ICD-10-CM

## 2019-01-21 NOTE — TELEPHONE ENCOUNTER
----- Message from Luis A Andersen PA-C sent at 1/21/2019  8:49 AM EST -----  Regarding: FW: Prescription Question  Contact: [phone] [address] [address] to put in rx  ----- Message -----  From: Boris Sirera  Sent: 1/21/2019   8:24 AM  To: Luis A Andersen PA-C  Subject: FW: Prescription Question                            ----- Message -----  From: Mikala Bernal  Sent: 1/18/2019   1:09 AM  To: [facility]  Subject: Prescription Question                            Eduardo Valadez,    Can you please send in a refill for ACYCLOVIR 200 mg  The last one was filled by Leanne Hatch in Nov of 2016  I keep this on hand for cold sores  [facility] [place] [address]   Thank you Giles Nicole

## 2019-01-22 ENCOUNTER — TELEPHONE (OUTPATIENT)
Dept: RADIOLOGY | Facility: CLINIC | Age: 49
End: 2019-01-22

## 2019-01-22 RX ORDER — ACYCLOVIR 200 MG/1
200 CAPSULE ORAL
Qty: 35 CAPSULE | Refills: 0 | Status: SHIPPED | OUTPATIENT
Start: 2019-01-22 | End: 2021-08-11 | Stop reason: SDUPTHER

## 2019-01-22 NOTE — TELEPHONE ENCOUNTER
Pt called, was scheduled for LESI on Wed 01/23/19- he is sick and cancelled tomorrow  Rescheduled for Fri 02/15/19

## 2019-02-04 DIAGNOSIS — F90.2 ADHD (ATTENTION DEFICIT HYPERACTIVITY DISORDER), COMBINED TYPE: Primary | ICD-10-CM

## 2019-02-04 RX ORDER — DEXTROAMPHETAMINE SACCHARATE, AMPHETAMINE ASPARTATE MONOHYDRATE, DEXTROAMPHETAMINE SULFATE AND AMPHETAMINE SULFATE 7.5; 7.5; 7.5; 7.5 MG/1; MG/1; MG/1; MG/1
30 CAPSULE, EXTENDED RELEASE ORAL EVERY MORNING
Qty: 30 CAPSULE | Refills: 0 | Status: SHIPPED | OUTPATIENT
Start: 2019-02-04 | End: 2019-09-04 | Stop reason: ALTCHOICE

## 2019-02-15 ENCOUNTER — HOSPITAL ENCOUNTER (OUTPATIENT)
Dept: RADIOLOGY | Facility: CLINIC | Age: 49
Discharge: HOME | End: 2019-02-15
Admitting: ANESTHESIOLOGY
Payer: COMMERCIAL

## 2019-02-15 VITALS
RESPIRATION RATE: 18 BRPM | DIASTOLIC BLOOD PRESSURE: 88 MMHG | TEMPERATURE: 98.2 F | SYSTOLIC BLOOD PRESSURE: 136 MMHG | OXYGEN SATURATION: 98 % | HEART RATE: 88 BPM

## 2019-02-15 DIAGNOSIS — M43.16 SPONDYLOLISTHESIS OF LUMBAR REGION: ICD-10-CM

## 2019-02-15 DIAGNOSIS — M48.061 SPINAL STENOSIS OF LUMBAR REGION, UNSPECIFIED WHETHER NEUROGENIC CLAUDICATION PRESENT: ICD-10-CM

## 2019-02-15 PROCEDURE — 62323 NJX INTERLAMINAR LMBR/SAC: CPT | Performed by: ANESTHESIOLOGY

## 2019-02-15 RX ORDER — METHYLPREDNISOLONE ACETATE 80 MG/ML
80 INJECTION, SUSPENSION INTRA-ARTICULAR; INTRALESIONAL; INTRAMUSCULAR; PARENTERAL; SOFT TISSUE ONCE
Status: COMPLETED | OUTPATIENT
Start: 2019-02-15 | End: 2019-02-15

## 2019-02-15 RX ORDER — LIDOCAINE HYDROCHLORIDE 10 MG/ML
5 INJECTION, SOLUTION EPIDURAL; INFILTRATION; INTRACAUDAL; PERINEURAL ONCE
Status: COMPLETED | OUTPATIENT
Start: 2019-02-15 | End: 2019-02-15

## 2019-02-15 RX ADMIN — METHYLPREDNISOLONE ACETATE 80 MG: 80 INJECTION, SUSPENSION INTRA-ARTICULAR; INTRALESIONAL; INTRAMUSCULAR; SOFT TISSUE at 09:09

## 2019-02-15 RX ADMIN — LIDOCAINE HYDROCHLORIDE 3 ML: 10 INJECTION, SOLUTION EPIDURAL; INFILTRATION; INTRACAUDAL; PERINEURAL at 09:09

## 2019-02-15 RX ADMIN — IOHEXOL 1 ML: 300 INJECTION, SOLUTION INTRAVENOUS at 09:09

## 2019-02-15 NOTE — DISCHARGE INSTRUCTIONS
Epidural Steroid Injection   WHAT YOU NEED TO KNOW:   An epidural steroid injection (OZZY) is a procedure to inject steroid medicine into the epidural space  The epidural space is between your spinal cord and vertebrae  Steroids reduce inflammation and fluid buildup in your spine that may be causing pain  You may be given pain medicine along with the steroids  ACTIVITY  · Do not drive or operate machinery today  · No strenuous activity today - bending, lifting, etc   · You may resume normal activites starting tomorrow - start slowly and as tolerated  · You may shower today, but no tub baths or hot tubs  · You may have numbness for several hours from the local anesthetic  Please use caution and common sense, especially with weight-bearing activities  CARE OF THE INJECTION SITE  · If you have soreness or pain, apply ice to the area today (20 minutes on/20 minutes off)  · Starting tomorrow, you may use warm, moist heat or ice if needed  · You may have an increase or change in your discomfort for 36-48 hours after your treatment  · Apply ice and continue with any pain medication you have been prescribed  · Notify the [facility] if you have any of the following: redness, drainage, swelling, headache, stiff neck or fever above 100°F     SPECIAL INSTRUCTIONS  · Our office will contact you in approximately 7 days for a progress report  MEDICATIONS  · Continue to take all routine medications  · Our office may have instructed you to hold some medications  If you have a problem specifically related to your procedure, please call our office at [phone]  Problems not related to your procedure should be directed to your primary care physician

## 2019-02-15 NOTE — H&P
History of Present Illness: The patient is a 52 y o  male who presents with complaints of low back and buttock pain      Patient Active Problem List   Diagnosis    Pure hypercholesterolemia    ADHD (attention deficit hyperactivity disorder), combined type    Depression    Testicular hypogonadism    Spinal stenosis of lumbar region    Ear lump, left    DDD (degenerative disc disease), lumbar    Spondylolisthesis of lumbar region    Lumbar spondylolysis    Lumbar spondylosis       Past Medical History:   Diagnosis Date    ADD (attention deficit disorder)     Anxiety     DDD (degenerative disc disease), lumbar 10/22/2018    Depression     ED (erectile dysfunction)     Hyperlipidemia        Past Surgical History:   Procedure Laterality Date    ELBOW SURGERY      NASAL SEPTUM SURGERY      VASECTOMY           Current Outpatient Medications:     acyclovir (ZOVIRAX) 200 mg capsule, Take 1 capsule (200 mg total) by mouth 5 (five) times a day for 7 days, Disp: 35 capsule, Rfl: 0    amphetamine-dextroamphetamine (ADDERALL XR) 30 MG 24 hr capsule, Take 1 capsule (30 mg total) by mouth every morning Max Daily Amount: 30 mg, Disp: 30 capsule, Rfl: 0    FLUoxetine (PROzac) 20 MG tablet, Take 1 tablet (20 mg total) by mouth daily, Disp: 90 tablet, Rfl: 3    promethazine-dextromethorphan (PHENERGAN-DM) 6 25-15 mg/5 mL oral syrup, Take 5 mL by mouth 4 (four) times a day as needed for cough, Disp: 240 mL, Rfl: 0    Testosterone 30 MG/ACT SOLN, 4 ACT (120 MG TOTAL) BY PUMP ROUTE 4 (FOUR) TIMES A DAY, Disp: 540 mL, Rfl: 2    Allergies   Allergen Reactions    Adhesive [Medical Tape] Rash       Physical Exam:   Vitals:    02/15/19 0841   BP: 121/76   Pulse: 79   Resp: 18   Temp: 98 2 °F (36 8 °C)   SpO2: 98%     General: Awake, Alert, Oriented x 3, Mood and affect appropriate  Respiratory: Respirations even and unlabored  Cardiovascular: Peripheral pulses intact; no edema  Musculoskeletal Exam:  Bilateral lumbar paraspinals tender to palpation  ASA Score: 2    Patient/Chart Verification  Patient ID Verified: Verbal  ID Band Applied: No  Consents Confirmed: Procedural  H&P( within 30 days) Verified: To be obtained in the Pre-Procedure area  Interval H&P(within 24 hr) Complete (required for Outpatients and Surgery Admit only): To be obtained in the Pre-Procedure area  Allergies Reviewed: Yes  Anticoag/NSAID held?: No  Currently on antibiotics?: No  Pre-op Lab/Test Results Available: N/A    Assessment:   1  Spinal stenosis of lumbar region, unspecified whether neurogenic claudication present    2   Spondylolisthesis of lumbar region        Plan: lumbar spondylolisthesis and lumbar stenosis - LESI

## 2019-02-18 ENCOUNTER — TELEPHONE (OUTPATIENT)
Dept: INTERNAL MEDICINE CLINIC | Facility: CLINIC | Age: 49
End: 2019-02-18

## 2019-02-18 NOTE — TELEPHONE ENCOUNTER
Pt has appt coming up with you 3/6/19 and would like to have blood work done before appt  If you are ok with this please put orders in and he would like for us to call him back to let him know   Thanks

## 2019-02-18 NOTE — TELEPHONE ENCOUNTER
Spoke with pt, he aware not due for labs until July  He might call office to reschedule until then  He is going to think about it and get back to us

## 2019-02-22 ENCOUNTER — TELEPHONE (OUTPATIENT)
Dept: PAIN MEDICINE | Facility: CLINIC | Age: 49
End: 2019-02-22

## 2019-02-22 NOTE — TELEPHONE ENCOUNTER
Aware, we will see how the patient does over the next week as it can take up to 2 weeks for him to notice full effect of injection    Will re-evaluate at next office visit

## 2019-03-04 ENCOUNTER — TELEPHONE (OUTPATIENT)
Dept: INTERNAL MEDICINE CLINIC | Facility: CLINIC | Age: 49
End: 2019-03-04

## 2019-03-04 DIAGNOSIS — Z13.220 SCREENING FOR HYPERLIPIDEMIA: Primary | ICD-10-CM

## 2019-03-04 DIAGNOSIS — E55.9 VITAMIN D DEFICIENCY: ICD-10-CM

## 2019-03-04 DIAGNOSIS — Z13.0 SCREENING FOR DEFICIENCY ANEMIA: ICD-10-CM

## 2019-03-04 DIAGNOSIS — F32.A DEPRESSION, UNSPECIFIED DEPRESSION TYPE: ICD-10-CM

## 2019-03-04 DIAGNOSIS — Z13.1 SCREENING FOR DIABETES MELLITUS: ICD-10-CM

## 2019-03-04 NOTE — TELEPHONE ENCOUNTER
Pt requesting Blood work order for July be in his chart    Has appt for July 18 for check-up/    Thank you

## 2019-03-20 ENCOUNTER — TELEPHONE (OUTPATIENT)
Dept: INTERNAL MEDICINE CLINIC | Facility: CLINIC | Age: 49
End: 2019-03-20

## 2019-03-20 NOTE — TELEPHONE ENCOUNTER
----- Message from Rasta Espinosa PA-C sent at 3/20/2019  2:14 PM EDT -----  Regarding: RE: Weston Hidalgo please, thanks  ----- Message -----  From: Milana Bowie MA  Sent: 3/20/2019  11:59 AM  To: Rasta Espinosa PA-C  Subject: strattera                                        Received a request from [employer] for a 90day supply strattera, I do not see it on his current med list, I see he is currently on adderall  Do you want to fill it or discontinue it?

## 2019-03-24 ENCOUNTER — OFFICE VISIT (OUTPATIENT)
Dept: URGENT CARE | Facility: CLINIC | Age: 49
End: 2019-03-24
Payer: COMMERCIAL

## 2019-03-24 VITALS
OXYGEN SATURATION: 99 % | SYSTOLIC BLOOD PRESSURE: 133 MMHG | DIASTOLIC BLOOD PRESSURE: 89 MMHG | TEMPERATURE: 97.6 F | RESPIRATION RATE: 18 BRPM | HEART RATE: 78 BPM

## 2019-03-24 DIAGNOSIS — J30.9 ALLERGIC RHINITIS, UNSPECIFIED SEASONALITY, UNSPECIFIED TRIGGER: ICD-10-CM

## 2019-03-24 DIAGNOSIS — S05.01XA ABRASION OF RIGHT CORNEA, INITIAL ENCOUNTER: Primary | ICD-10-CM

## 2019-03-24 PROCEDURE — 99213 OFFICE O/P EST LOW 20 MIN: CPT | Performed by: PHYSICIAN ASSISTANT

## 2019-03-24 RX ORDER — FLUTICASONE PROPIONATE 50 MCG
2 SPRAY, SUSPENSION (ML) NASAL DAILY
Qty: 1 BOTTLE | Refills: 0 | Status: SHIPPED | OUTPATIENT
Start: 2019-03-24 | End: 2019-09-04 | Stop reason: ALTCHOICE

## 2019-03-24 RX ORDER — ERYTHROMYCIN 5 MG/G
0.5 OINTMENT OPHTHALMIC EVERY 8 HOURS SCHEDULED
Qty: 3.5 G | Refills: 0 | Status: SHIPPED | OUTPATIENT
Start: 2019-03-24 | End: 2019-03-29

## 2019-03-24 NOTE — PATIENT INSTRUCTIONS
Use ointment as prescribed  Follow up with eye doctor in the next 5 days  Over the counter antihistamine  Start flonase

## 2019-03-24 NOTE — PROGRESS NOTES
[facility]    NAME: Lauren Bobby is a 52 y o  male  : 1970    MRN: [id number]  DATE: 2019  TIME: 11:04 AM    Assessment and Plan   Abrasion of right cornea, initial encounter [S05 01XA]  1  Abrasion of right cornea, initial encounter  erythromycin (ILOTYCIN) ophthalmic ointment   2  Allergic rhinitis, unspecified seasonality, unspecified trigger  fluticasone (FLONASE) 50 mcg/act nasal spray   Foreign body removal  Date/Time: 3/24/2019 11:05 AM  Performed by: Hawa Ybarra PA-C  Authorized by: Hawa Ybarra PA-C   Universal Protocol:Consent given by: patient    Body area: eye    Anesthesia:  Local Anesthetic: tetracaine drops  Anesthetic total: 2 mL  Localization method: eyelid eversion and visualized  Removal mechanism: irrigation  Eye examined with fluorescein  Fluorescein uptake  Corneal abrasion size: small  Corneal abrasion location: medial  No residual rust ring present  Complexity: simple  Post-procedure assessment: foreign body not removed  Patient tolerance: Patient tolerated the procedure well with no immediate complications        Patient Instructions     Patient Instructions   Use ointment as prescribed  Follow up with eye doctor in the next 5 days  Over the counter antihistamine  Start flonase  Chief Complaint     Chief Complaint   Patient presents with    Eye Pain     Pt reports he feels like there is something in his right eye   Cold Like Symptoms       History of Present Illness   79-year-old female male here with complaint of right eye pain  Felt like he got something in his eye yesterday  Does not sure if it is piece of dirt or wet  Woke up and his eye was more red and irritated  Light does not bother his eyes  Eyes watery  Also complaining of nasal congestion for the last couple of days  Thinks that he has allergies  Denies any fever or chills  No sore throat        Review of Systems   Review of Systems   Constitutional: Negative for activity change, appetite change, chills, diaphoresis, fatigue, fever and unexpected weight change  HENT: Positive for congestion and rhinorrhea  Negative for ear pain, hearing loss, sinus pressure, sneezing, sore throat and tinnitus  Eyes: Positive for pain and redness  Negative for photophobia and visual disturbance  Respiratory: Negative for apnea, cough, chest tightness, shortness of breath, wheezing and stridor  Cardiovascular: Negative for chest pain, palpitations and leg swelling  Gastrointestinal: Negative for abdominal distention, abdominal pain, blood in stool, constipation, diarrhea, nausea and vomiting  Endocrine: Negative for cold intolerance, heat intolerance, polydipsia, polyphagia and polyuria  Genitourinary: Negative for difficulty urinating, dysuria, flank pain, hematuria and urgency  Musculoskeletal: Negative for arthralgias, back pain, gait problem, myalgias, neck pain and neck stiffness  Skin: Negative for rash and wound  Neurological: Negative for dizziness, tremors, seizures, syncope, weakness, light-headedness, numbness and headaches  Hematological: Negative for adenopathy  Does not bruise/bleed easily  Psychiatric/Behavioral: Negative for agitation, behavioral problems, confusion and decreased concentration  The patient is not nervous/anxious          Current Medications     Current Outpatient Medications:     acyclovir (ZOVIRAX) 200 mg capsule, Take 1 capsule (200 mg total) by mouth 5 (five) times a day for 7 days, Disp: 35 capsule, Rfl: 0    amphetamine-dextroamphetamine (ADDERALL XR) 30 MG 24 hr capsule, Take 1 capsule (30 mg total) by mouth every morning Max Daily Amount: 30 mg, Disp: 30 capsule, Rfl: 0    erythromycin (ILOTYCIN) ophthalmic ointment, Administer 0 5 inches to the right eye every 8 (eight) hours for 5 days, Disp: 3 5 g, Rfl: 0    FLUoxetine (PROzac) 20 MG tablet, Take 1 tablet (20 mg total) by mouth daily, Disp: 90 tablet, Rfl: 3   fluticasone (FLONASE) 50 mcg/act nasal spray, 2 sprays into each nostril daily, Disp: 1 Bottle, Rfl: 0    promethazine-dextromethorphan (PHENERGAN-DM) 6 25-15 mg/5 mL oral syrup, Take 5 mL by mouth 4 (four) times a day as needed for cough (Patient not taking: Reported on 3/24/2019), Disp: 240 mL, Rfl: 0    Testosterone 30 MG/ACT SOLN, 4 ACT (120 MG TOTAL) BY PUMP ROUTE 4 (FOUR) TIMES A DAY, Disp: 540 mL, Rfl: 2    Current Allergies     Allergies as of 03/24/2019 - Reviewed 03/24/2019   Allergen Reaction Noted    Adhesive [medical tape] Rash 04/11/2018          The following portions of the patient's history were reviewed and updated as appropriate: allergies, current medications, past family history, past medical history, past social history, past surgical history and problem list    Past Medical History:   Diagnosis Date    ADD (attention deficit disorder)     Anxiety     DDD (degenerative disc disease), lumbar 10/22/2018    Depression     ED (erectile dysfunction)     Hyperlipidemia      Past Surgical History:   Procedure Laterality Date    ELBOW SURGERY      NASAL SEPTUM SURGERY      VASECTOMY       Family History   Problem Relation Age of Onset    No Known Problems Mother     No Known Problems Father      Social History     Socioeconomic History    Marital status: /Civil Union     Spouse name: Not on file    Number of children: Not on file    Years of education: Not on file    Highest education level: Not on file   Occupational History    Occupation: Marketing   Social Needs    Financial resource strain: Not on file    Food insecurity:     Worry: Not on file     Inability: Not on file    Transportation needs:     Medical: Not on file     Non-medical: Not on file   Tobacco Use    Smoking status: Never Smoker    Smokeless tobacco: Never Used   Substance and Sexual Activity    Alcohol use: Yes     Comment: Social    Drug use: No    Sexual activity: Not on file   Lifestyle    Physical activity:     Days per week: Not on file     Minutes per session: Not on file    Stress: Not on file   Relationships    Social connections:     Talks on phone: Not on file     Gets together: Not on file     Attends [employer] service: Not on file     Active member of club or organization: Not on file     Attends meetings of clubs or organizations: Not on file     Relationship status: Not on file    Intimate partner violence:     Fear of current or ex partner: Not on file     Emotionally abused: Not on file     Physically abused: Not on file     Forced sexual activity: Not on file   Other Topics Concern    Not on file   Social History Narrative    Daily Caffeine Consumption         Medications have been verified  Objective   /89   Pulse 78   Temp 97 6 °F (36 4 °C)   Resp 18   SpO2 99%      Physical Exam   Physical Exam   Constitutional: He appears well-developed and well-nourished  No distress  HENT:   Head: Normocephalic  Right Ear: Tympanic membrane and external ear normal    Left Ear: Tympanic membrane and external ear normal    Nose: Mucosal edema and rhinorrhea (Clear) present  Mouth/Throat: Oropharynx is clear and moist  No oropharyngeal exudate  Eyes: Right conjunctiva is injected  Neck: Normal range of motion  Neck supple  Cardiovascular: Normal rate, regular rhythm and normal heart sounds  No murmur heard  Pulmonary/Chest: Effort normal and breath sounds normal  No respiratory distress  He has no wheezes  He has no rales  Abdominal: Soft  Bowel sounds are normal  There is no tenderness  Musculoskeletal: Normal range of motion  Lymphadenopathy:     He has no cervical adenopathy  Skin: Skin is warm  No rash noted  Nursing note and vitals reviewed

## 2019-06-04 ENCOUNTER — TELEPHONE (OUTPATIENT)
Dept: INTERNAL MEDICINE CLINIC | Facility: CLINIC | Age: 49
End: 2019-06-04

## 2019-06-17 DIAGNOSIS — E78.00 PURE HYPERCHOLESTEROLEMIA: Primary | ICD-10-CM

## 2019-06-17 RX ORDER — EZETIMIBE 10 MG/1
10 TABLET ORAL DAILY
Qty: 90 TABLET | Refills: 1 | Status: SHIPPED | OUTPATIENT
Start: 2019-06-17 | End: 2019-12-13 | Stop reason: SDUPTHER

## 2019-06-17 RX ORDER — EZETIMIBE 10 MG/1
TABLET ORAL
COMMUNITY
Start: 2019-06-14 | End: 2019-06-17 | Stop reason: SDUPTHER

## 2019-07-01 ENCOUNTER — CLINICAL SUPPORT (OUTPATIENT)
Dept: PAIN MEDICINE | Facility: CLINIC | Age: 49
End: 2019-07-01
Payer: COMMERCIAL

## 2019-07-01 VITALS
SYSTOLIC BLOOD PRESSURE: 138 MMHG | HEART RATE: 88 BPM | HEIGHT: 66 IN | WEIGHT: 178 LBS | BODY MASS INDEX: 28.61 KG/M2 | DIASTOLIC BLOOD PRESSURE: 82 MMHG | TEMPERATURE: 98.2 F

## 2019-07-01 DIAGNOSIS — M54.50 CHRONIC LOW BACK PAIN WITHOUT SCIATICA, UNSPECIFIED BACK PAIN LATERALITY: ICD-10-CM

## 2019-07-01 DIAGNOSIS — M43.16 SPONDYLOLISTHESIS OF LUMBAR REGION: Primary | ICD-10-CM

## 2019-07-01 DIAGNOSIS — M47.816 LUMBAR SPONDYLOSIS: ICD-10-CM

## 2019-07-01 DIAGNOSIS — M75.51 SUBACROMIAL BURSITIS OF RIGHT SHOULDER JOINT: ICD-10-CM

## 2019-07-01 DIAGNOSIS — M25.511 RIGHT SHOULDER PAIN, UNSPECIFIED CHRONICITY: ICD-10-CM

## 2019-07-01 DIAGNOSIS — M43.06 LUMBAR SPONDYLOLYSIS: ICD-10-CM

## 2019-07-01 DIAGNOSIS — G89.29 CHRONIC LOW BACK PAIN WITHOUT SCIATICA, UNSPECIFIED BACK PAIN LATERALITY: ICD-10-CM

## 2019-07-01 PROCEDURE — 99214 OFFICE O/P EST MOD 30 MIN: CPT | Performed by: ANESTHESIOLOGY

## 2019-07-01 NOTE — PROGRESS NOTES
Assessment  1  Spondylolisthesis of lumbar region    2  Lumbar spondylolysis    3  Lumbar spondylosis    4  Subacromial bursitis of right shoulder joint    5  Right shoulder pain, unspecified chronicity    6  Chronic low back pain without sciatica, unspecified back pain laterality        Plan  26-year-old male returning for follow-up of axial lumbosacral back pain secondary to degenerative disc disease, spondylosis, and grade 2 spondylolisthesis at L4-5 secondary to spondylolysis of L4  The patient also complains of localized right shoulder pain which occurred after lifting weights in the gym  The patient's low back pain has myofascial and facet mediated components as well as neuropathic components  The patient's right shoulder pain seems to be secondary to rotator cuff tendinitis/subacromial bursitis  The patient did have lumbar medial branch blocks which were ineffective  The patient did have about 50-60% relief from L4-5 LESI which lasted approximately 2 months  The patient would like to repeat this procedure for pain relief  The patient states his right shoulder is his main complaint today, followed closely by his low back  He would like his right shoulder addressed 1st   He does take ibuprofen p r n  On a sparing basis with mild to moderate relief  1  I will schedule the patient for right subacromial bursa injection with ultrasound guidance to reduce the inflammatory component of his pain  2  We will schedule the patient for L4-5 LESI, 2 weeks after right subacromial bursa injection  3  The patient may continue with ibuprofen p r n  Should not exceed more than 800 mg q 8 hours p r n   4  I will follow up the patient in 4 weeks after LESI   5  X-ray of the right shoulder was ordered  6   Physical therapy was ordered for the patient's right shoulder to reduce pain and improved function       Complete risks and benefits including bleeding, infection, tissue reaction, nerve injury and allergic reaction were discussed  The approach was demonstrated using models and literature was provided  Verbal and written consent was obtained  My impressions and treatment recommendations were discussed in detail with the patient who verbalized understanding and had no further questions  Discharge instructions were provided  I personally saw and examined the patient and I agree with the above discussed plan of care  No orders of the defined types were placed in this encounter  No orders of the defined types were placed in this encounter  History of Present Illness    Yury Hernandez is a 52 y o  male returning for follow-up of low back pain  He denies any radiation into his lower extremities  He denies any numbness, paresthesias, or weakness of the legs  He denies any bladder or bowel incontinence or saddle anesthesia  The patient has a new complaint of localized right shoulder pain which occurred a few weeks ago when he was lifting heavy weights in the gym  He denies any numbness, paresthesias, or weakness of the right arm  He denies any significant neck pain  The patient's right shoulder pain is his biggest complaint today  The patient had about 60% relief for approximately 2 months after L4-5 LESI of his low back pain  He is currently taking ibuprofen p r n  For his low back and right shoulder complaints with mild to moderate relief  The patient has had medial branch blocks of the lumbar region without any significant relief  The patient rates pain a 4/10 and the pain does not follow any particular pattern throughout the day  The pain is occasional and described sharp  The pain is worse with exercise, bending, and overhead activities of the right arm  The pain is alleviated with sitting, relaxation, and lying down      I have personally reviewed and/or updated the patient's past medical history, past surgical history, family history, social history, current medications, allergies, and vital signs today  Other than as stated above, the patient denies any interval changes in medications, medical condition, mental condition, symptoms, or allergies since the last office visit  Review of Systems   Constitutional: Negative for fever and unexpected weight change  HENT: Negative for trouble swallowing  Eyes: Negative for visual disturbance  Respiratory: Negative for shortness of breath and wheezing  Cardiovascular: Negative for chest pain and palpitations  Gastrointestinal: Negative for constipation, diarrhea, nausea and vomiting  Endocrine: Negative for cold intolerance, heat intolerance and polydipsia  Genitourinary: Negative for difficulty urinating and frequency  Musculoskeletal: Positive for joint swelling  Negative for arthralgias, gait problem and myalgias  Decreased ROM   Skin: Negative for rash  Neurological: Negative for dizziness, seizures, syncope, weakness and headaches  Hematological: Does not bruise/bleed easily  Psychiatric/Behavioral: Negative for dysphoric mood  All other systems reviewed and are negative        Patient Active Problem List   Diagnosis    Pure hypercholesterolemia    ADHD (attention deficit hyperactivity disorder), combined type    Depression    Testicular hypogonadism    Spinal stenosis of lumbar region    Ear lump, left    DDD (degenerative disc disease), lumbar    Spondylolisthesis of lumbar region    Lumbar spondylolysis    Lumbar spondylosis       Past Medical History:   Diagnosis Date    ADD (attention deficit disorder)     Anxiety     DDD (degenerative disc disease), lumbar 10/22/2018    Depression     ED (erectile dysfunction)     Hyperlipidemia        Past Surgical History:   Procedure Laterality Date    ELBOW SURGERY      NASAL SEPTUM SURGERY      VASECTOMY         Family History   Problem Relation Age of Onset    No Known Problems Mother     No Known Problems Father        Social History     Occupational History    Occupation: Marketing   Tobacco Use    Smoking status: Never Smoker    Smokeless tobacco: Never Used   Substance and Sexual Activity    Alcohol use: Yes     Comment: Social    Drug use: No    Sexual activity: Not on file       Current Outpatient Medications on File Prior to Visit   Medication Sig    amphetamine-dextroamphetamine (ADDERALL XR) 30 MG 24 hr capsule Take 1 capsule (30 mg total) by mouth every morning Max Daily Amount: 30 mg    ezetimibe (ZETIA) 10 mg tablet Take 1 tablet (10 mg total) by mouth daily    FLUoxetine (PROzac) 20 MG tablet Take 1 tablet (20 mg total) by mouth daily    Testosterone 30 MG/ACT SOLN 4 ACT (120 MG TOTAL) BY PUMP ROUTE 4 (FOUR) TIMES A DAY    acyclovir (ZOVIRAX) 200 mg capsule Take 1 capsule (200 mg total) by mouth 5 (five) times a day for 7 days    fluticasone (FLONASE) 50 mcg/act nasal spray 2 sprays into each nostril daily (Patient not taking: Reported on 7/1/2019)    promethazine-dextromethorphan (PHENERGAN-DM) 6 25-15 mg/5 mL oral syrup Take 5 mL by mouth 4 (four) times a day as needed for cough (Patient not taking: Reported on 3/24/2019)     No current facility-administered medications on file prior to visit  Allergies   Allergen Reactions    Adhesive [Medical Tape] Rash       Physical Exam    /82   Pulse 88   Temp 98 2 °F (36 8 °C) (Oral)   Ht 5' 6" (1 676 m)   Wt 80 7 kg (178 lb)   BMI 28 73 kg/m²     Constitutional: normal, well developed, well nourished, alert, in no distress and non-toxic and no overt pain behavior  Eyes: anicteric  HEENT: grossly intact  Neck: supple, symmetric, trachea midline and no masses   Pulmonary:even and unlabored  Cardiovascular:No edema or pitting edema present  Skin:Normal without rashes or lesions and well hydrated  Psychiatric:Mood and affect appropriate  Neurologic:Cranial Nerves II-XII grossly intact  Musculoskeletal:normal gait    Bilateral lumbar paraspinals tender to palpation from L4-S1  Bilateral SI joints nontender to palpation  Bilateral lower extremity strength 5/5 in all muscle groups  Sensation intact to light touch in L3 through S1 dermatomes bilaterally  Negative seated straight leg raise bilaterally  Tenderness to palpation over the superolateral aspect of the right shoulder  Slightly limited range of motion with abduction of the right shoulder to about 160°  Right upper extremity strength 5/5 in all muscle groups  Sensation intact to light touch in C5 through T1 dermatomes  Negative Spurling's bilaterally  Bilateral cervical paraspinals and trapezii nontender to palpation  Neer, empty can, and Tyson tests reproduced right shoulder pain      Imaging  Imaging reviewed

## 2019-07-03 ENCOUNTER — TELEPHONE (OUTPATIENT)
Dept: PAIN MEDICINE | Facility: CLINIC | Age: 49
End: 2019-07-03

## 2019-07-03 NOTE — TELEPHONE ENCOUNTER
----- Message from Selene Elizondo sent at 7/2/2019  9:38 PM EDT -----  Regarding: Visit Follow-Up Question  Contact: [phone]  Fam  89 ,    I just wanted to check, should I start PT ASAP or am I to wait for the X-Ray or the injection        Thank you, Stella Dailey

## 2019-07-03 NOTE — TELEPHONE ENCOUNTER
Please we to schedule physical therapy until after x-ray and injection, this way he can maximize his physical therapy sessions

## 2019-07-05 ENCOUNTER — APPOINTMENT (OUTPATIENT)
Dept: RADIOLOGY | Facility: CLINIC | Age: 49
End: 2019-07-05
Payer: COMMERCIAL

## 2019-07-05 DIAGNOSIS — M25.511 RIGHT SHOULDER PAIN, UNSPECIFIED CHRONICITY: ICD-10-CM

## 2019-07-05 PROCEDURE — 73030 X-RAY EXAM OF SHOULDER: CPT

## 2019-07-18 ENCOUNTER — CLINICAL SUPPORT (OUTPATIENT)
Dept: PAIN MEDICINE | Facility: CLINIC | Age: 49
End: 2019-07-18
Payer: COMMERCIAL

## 2019-07-18 VITALS
DIASTOLIC BLOOD PRESSURE: 83 MMHG | WEIGHT: 172 LBS | SYSTOLIC BLOOD PRESSURE: 137 MMHG | HEIGHT: 66 IN | BODY MASS INDEX: 27.64 KG/M2 | TEMPERATURE: 98.2 F | HEART RATE: 81 BPM

## 2019-07-18 DIAGNOSIS — M75.51 SUBACROMIAL BURSITIS OF RIGHT SHOULDER JOINT: Primary | ICD-10-CM

## 2019-07-18 PROCEDURE — 20611 DRAIN/INJ JOINT/BURSA W/US: CPT | Performed by: ANESTHESIOLOGY

## 2019-07-18 RX ORDER — METHYLPREDNISOLONE ACETATE 40 MG/ML
40 INJECTION, SUSPENSION INTRA-ARTICULAR; INTRALESIONAL; INTRAMUSCULAR; SOFT TISSUE ONCE
Status: COMPLETED | OUTPATIENT
Start: 2019-07-18 | End: 2019-07-18

## 2019-07-18 RX ORDER — ROPIVACAINE HYDROCHLORIDE 5 MG/ML
30 INJECTION, SOLUTION EPIDURAL; INFILTRATION; PERINEURAL ONCE
Status: COMPLETED | OUTPATIENT
Start: 2019-07-18 | End: 2019-07-18

## 2019-07-18 RX ADMIN — ROPIVACAINE HYDROCHLORIDE 30 ML: 5 INJECTION, SOLUTION EPIDURAL; INFILTRATION; PERINEURAL at 13:20

## 2019-07-18 RX ADMIN — METHYLPREDNISOLONE ACETATE 40 MG: 40 INJECTION, SUSPENSION INTRA-ARTICULAR; INTRALESIONAL; INTRAMUSCULAR; SOFT TISSUE at 13:18

## 2019-07-18 NOTE — PROGRESS NOTES
Large joint arthrocentesis: R subacromial bursa  Date/Time: 7/18/2019 11:24 AM  Consent given by: patient  Site marked: site marked  Timeout: Immediately prior to procedure a time out was called to verify the correct patient, procedure, equipment, support staff and site/side marked as required   Supporting Documentation  Indications: pain   Procedure Details  Location: shoulder - R subacromial bursa  Preparation: Patient was prepped and draped in the usual sterile fashion  Needle size: 25 G  Ultrasound guidance: yes  Medication group details: 3 cc of 0 5% ropivacaine and 40 mg of Depo-Medrol  Ultrasound-guided right subacromial subdeltoid bursa injection    Indication:  Right shoulder pain  Preoperative diagnosis:  Right shoulder bursitis  Postoperative diagnosis:  Right shoulder bursitis  Procedure: Ultrasound-guided right subacromial subdeltoid bursa injection    After discussing the risks, benefits, and alternatives to the procedure, the patient expressed understanding and wished to proceed  The patient was brought to the procedure suite and placed in the side lying position  A procedural pause was conducted to verify: correct patient identity, procedure to be performed and as applicable, correct side and site, correct patient position, and availability of implants, special equipment, or special requirements  A simple surgical tray was used  Prior to the procedure, the right shoulder was examined with a 12MHz linear transducer to visualize the subacromial-subdeltoid bursa and determine the optimal needle path  Following this, the right shoulder was prepared with a Chloraprep scrub, then re-examined using the same transducer, a sterile utrasound transducer cover, and sterile ultrasound transducer gel  Thereafter, using ultrasound guidance, a 2 5 inch 25 guage needle was advanced into the right subacromial-subdeltoid bursa   After visualization of the tip of the needle in the target area and negative aspiration for blood, a mixture of 40mg of Depo-Medrol in 3cc of 0 5% ropivacaine was injected into the right subacromial subdeltoid bursa  The patient tolerated the procedure well and there were no apparent complications  After an appropriate amount of observation, the patient was dismissed from the recovery area in good condition under their own power  COMMENTS:  The patient received a total steroid dose of 40mg of Depo-Medrol

## 2019-07-25 ENCOUNTER — TELEPHONE (OUTPATIENT)
Dept: PAIN MEDICINE | Facility: CLINIC | Age: 49
End: 2019-07-25

## 2019-07-26 ENCOUNTER — APPOINTMENT (OUTPATIENT)
Dept: LAB | Facility: CLINIC | Age: 49
End: 2019-07-26
Payer: COMMERCIAL

## 2019-07-26 DIAGNOSIS — Z13.0 SCREENING FOR DEFICIENCY ANEMIA: ICD-10-CM

## 2019-07-26 DIAGNOSIS — E55.9 VITAMIN D DEFICIENCY: ICD-10-CM

## 2019-07-26 DIAGNOSIS — Z13.1 SCREENING FOR DIABETES MELLITUS: ICD-10-CM

## 2019-07-26 DIAGNOSIS — F32.A DEPRESSION, UNSPECIFIED DEPRESSION TYPE: ICD-10-CM

## 2019-07-26 DIAGNOSIS — Z13.220 SCREENING FOR HYPERLIPIDEMIA: ICD-10-CM

## 2019-07-26 LAB
25(OH)D3 SERPL-MCNC: 56.9 NG/ML (ref 30–100)
ALBUMIN SERPL BCP-MCNC: 4.1 G/DL (ref 3.5–5)
ALP SERPL-CCNC: 63 U/L (ref 46–116)
ALT SERPL W P-5'-P-CCNC: 55 U/L (ref 12–78)
ANION GAP SERPL CALCULATED.3IONS-SCNC: 4 MMOL/L (ref 4–13)
AST SERPL W P-5'-P-CCNC: 23 U/L (ref 5–45)
BASOPHILS # BLD AUTO: 0.03 THOUSANDS/ΜL (ref 0–0.1)
BASOPHILS NFR BLD AUTO: 1 % (ref 0–1)
BILIRUB SERPL-MCNC: 1.2 MG/DL (ref 0.2–1)
BUN SERPL-MCNC: 25 MG/DL (ref 5–25)
CALCIUM SERPL-MCNC: 8.7 MG/DL (ref 8.3–10.1)
CHLORIDE SERPL-SCNC: 107 MMOL/L (ref 100–108)
CHOLEST SERPL-MCNC: 203 MG/DL (ref 50–200)
CO2 SERPL-SCNC: 30 MMOL/L (ref 21–32)
CREAT SERPL-MCNC: 1.04 MG/DL (ref 0.6–1.3)
EOSINOPHIL # BLD AUTO: 0.47 THOUSAND/ΜL (ref 0–0.61)
EOSINOPHIL NFR BLD AUTO: 7 % (ref 0–6)
ERYTHROCYTE [DISTWIDTH] IN BLOOD BY AUTOMATED COUNT: 13.2 % (ref 11.6–15.1)
GFR SERPL CREATININE-BSD FRML MDRD: 84 ML/MIN/1.73SQ M
GLUCOSE P FAST SERPL-MCNC: 87 MG/DL (ref 65–99)
HCT VFR BLD AUTO: 50.7 % (ref 36.5–49.3)
HDLC SERPL-MCNC: 56 MG/DL (ref 40–60)
HGB BLD-MCNC: 16.9 G/DL (ref 12–17)
IMM GRANULOCYTES # BLD AUTO: 0.01 THOUSAND/UL (ref 0–0.2)
IMM GRANULOCYTES NFR BLD AUTO: 0 % (ref 0–2)
LDLC SERPL CALC-MCNC: 129 MG/DL (ref 0–100)
LYMPHOCYTES # BLD AUTO: 1.89 THOUSANDS/ΜL (ref 0.6–4.47)
LYMPHOCYTES NFR BLD AUTO: 29 % (ref 14–44)
MCH RBC QN AUTO: 29.4 PG (ref 26.8–34.3)
MCHC RBC AUTO-ENTMCNC: 33.3 G/DL (ref 31.4–37.4)
MCV RBC AUTO: 88 FL (ref 82–98)
MONOCYTES # BLD AUTO: 0.56 THOUSAND/ΜL (ref 0.17–1.22)
MONOCYTES NFR BLD AUTO: 9 % (ref 4–12)
NEUTROPHILS # BLD AUTO: 3.46 THOUSANDS/ΜL (ref 1.85–7.62)
NEUTS SEG NFR BLD AUTO: 54 % (ref 43–75)
NRBC BLD AUTO-RTO: 0 /100 WBCS
PLATELET # BLD AUTO: 298 THOUSANDS/UL (ref 149–390)
PMV BLD AUTO: 9.7 FL (ref 8.9–12.7)
POTASSIUM SERPL-SCNC: 3.9 MMOL/L (ref 3.5–5.3)
PROT SERPL-MCNC: 7.3 G/DL (ref 6.4–8.2)
RBC # BLD AUTO: 5.74 MILLION/UL (ref 3.88–5.62)
SODIUM SERPL-SCNC: 141 MMOL/L (ref 136–145)
TRIGL SERPL-MCNC: 91 MG/DL
TSH SERPL DL<=0.05 MIU/L-ACNC: 2.2 UIU/ML (ref 0.36–3.74)
WBC # BLD AUTO: 6.42 THOUSAND/UL (ref 4.31–10.16)

## 2019-07-26 PROCEDURE — 36415 COLL VENOUS BLD VENIPUNCTURE: CPT

## 2019-07-26 PROCEDURE — 85025 COMPLETE CBC W/AUTO DIFF WBC: CPT

## 2019-07-26 PROCEDURE — 82306 VITAMIN D 25 HYDROXY: CPT

## 2019-07-26 PROCEDURE — 80053 COMPREHEN METABOLIC PANEL: CPT

## 2019-07-26 PROCEDURE — 84443 ASSAY THYROID STIM HORMONE: CPT

## 2019-07-26 PROCEDURE — 80061 LIPID PANEL: CPT

## 2019-07-29 ENCOUNTER — EVALUATION (OUTPATIENT)
Dept: PHYSICAL THERAPY | Facility: REHABILITATION | Age: 49
End: 2019-07-29
Payer: COMMERCIAL

## 2019-07-29 DIAGNOSIS — M75.51 SUBACROMIAL BURSITIS OF RIGHT SHOULDER JOINT: Primary | ICD-10-CM

## 2019-07-29 DIAGNOSIS — M25.511 RIGHT SHOULDER PAIN, UNSPECIFIED CHRONICITY: ICD-10-CM

## 2019-07-29 PROCEDURE — 97161 PT EVAL LOW COMPLEX 20 MIN: CPT | Performed by: PHYSICAL THERAPIST

## 2019-07-29 PROCEDURE — 97110 THERAPEUTIC EXERCISES: CPT | Performed by: PHYSICAL THERAPIST

## 2019-07-29 PROCEDURE — 97112 NEUROMUSCULAR REEDUCATION: CPT | Performed by: PHYSICAL THERAPIST

## 2019-07-29 NOTE — PROGRESS NOTES
PT Evaluation     Today's date: 2019  Patient name: Yanna Norwood  : 1970  MRN: [id number]  Referring provider: Orquidea Bass DO  Dx:   Encounter Diagnosis     ICD-10-CM    1  Subacromial bursitis of right shoulder joint M75 51 Ambulatory referral to Physical Therapy   2  Right shoulder pain, unspecified chronicity M25 511 Ambulatory referral to Physical Therapy                  Assessment  Assessment details: Patient presents complaining of R shoulder pain that has been ongoing for about 4-5 months, he reports he had something similar in the opposite shoulder  He reports he has pain while trying to sleep  He reports he had an injection completed a week and a half ago and reports improvement in pain but still has some limitations in range of motion  He reports that he does have discomfort when reaching overhead or across his body  He reports he was lifting regularly until the pain began and has been slowly starting to integrate back in the gym  For work he is mostly doing desk work and reports no limitations at work  He reports the location of his pain varies, he reports most of it is deep to the joint, but he does occasionally have some pain posteriorly  He reports no pain into his shoulder blade or neck, and no pain down his arm  He reports no numbness and tingling currently  He reports nothing out of the ordinary as far as increase in activity or trauma, prior to the pain beginning  His x-ray was negative and only displayed some mild age-related changes  He reports he is usually a side sleeper on his right side  Patient demonstrates slight limitations in R shoulder IR range of motion and R shoulder strength  Patient demonstrates some signs of impingement syndrome in his shoulder  Patient made aware of condition as well as the proposed treatment plan, including risks, benefits and alternatives     Impairments: abnormal or restricted ROM, activity intolerance, impaired physical strength, lacks appropriate home exercise program and pain with function     Prognosis: good    Goals  ST- demonstrate compliancy with HEP in 1 week  Decrease R shoulder pain to 4/10 at worst and with activity, to improve functional capacity, within 2 weeks   Improve R shoulder strength to 5/5 to improve functional capacity at home, in 3 weeks     LT- Improve FOTO score to specified value to improve patients perceived benefit of therapy, in 8 weeks   Improve R shoulder ROM to full within 8 weeks  Be able to sleep through the night with no complaints of R shoulder pain, in 10 weeks     Plan  Plan details: Physical therapy with focus on there ex and manual therapy to improve ability to complete tasks around the house and complete functional activities, use of modalities as needed     Patient would benefit from: skilled physical therapy  Referral necessary: No  Planned modality interventions: cryotherapy, TENS and thermotherapy: hydrocollator packs  Planned therapy interventions: ADL training, balance, balance/weight bearing training, gait training, manual therapy, joint mobilization, neuromuscular re-education, strengthening, stretching, therapeutic activities and therapeutic exercise  Frequency: 2x week  Duration in weeks: 12  Plan of Care beginning date: 2019  Plan of Care expiration date: 10/21/2019  Treatment plan discussed with: patient        Subjective Evaluation    History of Present Illness  Date of onset: 2019  Mechanism of injury: Chronic onset   Pain  Current pain ratin  At best pain ratin  At worst pain ratin  Location: R shoulder   Quality: sharp  Aggravating factors: overhead activity and lifting    Treatments  Current treatment: injection treatment  Patient Goals  Patient goals for therapy: decreased pain and independence with ADLs/IADLs  Patient goal: sleeping without pain         Objective     General Comments:      Shoulder Comments   Slight tenderness noted on anterior aspect of right shoulder with palpation, mostly at joint line    rom  Shoulder flexion WFL B/L  Shoulder abduction WFL B/L   Shoulder ER scratch T1 B/L  Shoulder IR scratch L=T9 R=T11     mmt  Shoulder shrug 5/5 B/L  Shoulder flexion L=5/5 R=4+/5   Shoulder abduction 5/5 B/L  Shoulder ER L=5/5 R=4+/5   Shoulder IR L=5/5 R=4+/5   Elbow flexion 5/5 B/L  Elbow extension 5/5 B/L    Special tests   (+) [employer]     Joint play              Precautions: depression       Manual              R LAD             R shoulder ROM (flex, abd, IR)             Rhythmic stabilization in flexion, abduction                                            Exercise Diary              UBE             TB rows, extensions             SL ER              Prone I,Y,T              Sleeper stretch              Quadruped serratus punches             Table taps              Towel stretch for IR                                                                                                                                                                              Modalities

## 2019-07-31 ENCOUNTER — APPOINTMENT (OUTPATIENT)
Dept: PHYSICAL THERAPY | Facility: REHABILITATION | Age: 49
End: 2019-07-31
Payer: COMMERCIAL

## 2019-08-04 DIAGNOSIS — F32.A DEPRESSION, UNSPECIFIED DEPRESSION TYPE: ICD-10-CM

## 2019-08-05 ENCOUNTER — TELEPHONE (OUTPATIENT)
Dept: PAIN MEDICINE | Facility: MEDICAL CENTER | Age: 49
End: 2019-08-05

## 2019-08-05 ENCOUNTER — APPOINTMENT (OUTPATIENT)
Dept: PHYSICAL THERAPY | Facility: REHABILITATION | Age: 49
End: 2019-08-05
Payer: COMMERCIAL

## 2019-08-05 RX ORDER — FLUOXETINE 20 MG/1
20 TABLET, FILM COATED ORAL DAILY
Qty: 90 TABLET | Refills: 1 | Status: SHIPPED | OUTPATIENT
Start: 2019-08-05 | End: 2019-09-04

## 2019-08-05 NOTE — TELEPHONE ENCOUNTER
Pt called stating that his back pain is about a 2/10 pt states he is not sure if he should go through with getting the procedure done if his in not in so much pain now  Pt states he would like top get JW intake on this      Pt can be reached at [phone]

## 2019-08-06 NOTE — TELEPHONE ENCOUNTER
SW patient, advised of same  Patient states he will call back and make a follow up appointment  Made Ali michael

## 2019-08-06 NOTE — TELEPHONE ENCOUNTER
SW patient, states that his pain is 2-4/10 and he feels much better  Patient would like to know if he should cancel the appointment tomorrow? Patient has not been taking any pain relievers  He has added fish oil to his daily regimen  Patient said he is not lifting as many weights at the gym as he normally does due to his shoulder  Patient said to let Stevan Rico know his shoulder feels much better since his injection         Patient is scheduled for tomorrow 8/7/19 for LESI L4-5

## 2019-08-06 NOTE — TELEPHONE ENCOUNTER
If the patient's low back pain is not really bothering him okay to cancel epidural steroid injection    And patient can follow up in the office at his next scheduled office visit, or can schedule follow-up in 3 months

## 2019-08-06 NOTE — TELEPHONE ENCOUNTER
Patient said he's been taking fish oil since it helps with inflammation but he has been taking more than is recommended  He takes 1 tablet daily but he doesn't know how many milligrams it is  Pt asks that you let him know if you agree that he cancels his appt tomorrow or not      Call back# [phone]

## 2019-08-07 ENCOUNTER — OFFICE VISIT (OUTPATIENT)
Dept: PHYSICAL THERAPY | Facility: REHABILITATION | Age: 49
End: 2019-08-07
Payer: COMMERCIAL

## 2019-08-07 DIAGNOSIS — M25.511 RIGHT SHOULDER PAIN, UNSPECIFIED CHRONICITY: ICD-10-CM

## 2019-08-07 DIAGNOSIS — M75.51 SUBACROMIAL BURSITIS OF RIGHT SHOULDER JOINT: Primary | ICD-10-CM

## 2019-08-07 PROCEDURE — 97110 THERAPEUTIC EXERCISES: CPT

## 2019-08-07 PROCEDURE — 97112 NEUROMUSCULAR REEDUCATION: CPT

## 2019-08-07 PROCEDURE — 97140 MANUAL THERAPY 1/> REGIONS: CPT

## 2019-08-07 NOTE — PROGRESS NOTES
Daily Note     Today's date: 2019  Patient name: Perla Elmore  : 1970  MRN: [id number]  Referring provider: Jenny Dunn DO  Dx:   Encounter Diagnosis     ICD-10-CM    1  Subacromial bursitis of right shoulder joint M75 51    2  Right shoulder pain, unspecified chronicity M25 511                   Subjective: Pt stated that he was feeling good before treatment  Pt reported that he got an injection in his R shoulder about 2 weeks ago and has been feeling better after he got the injection  Objective: See treatment diary below      Assessment: Tolerated treatment well  Patient would benefit from continued PT  Pt did well with all exercises with no complaints  Reviewed HEP, pt understood  No increase in pain during or after treatment  Pt 1:1 with ERNST/SPTA entirety  Treated by SAMY Mcmullen under direct supervision from Zoë Alicea PTA  Plan: Continue per plan of care        Precautions: depression       Manual              R LAD HSS 2'            R shoulder ROM (flex, abd, IR) HSS 8'            Rhythmic stabilization in flexion, abduction  HSS 4'                                          Exercise Diary              UBE 2/2'            TB rows, extensions 20x ea GTB            SL ER  20x            Prone I,Y,T  20x ea            Sleeper stretch  3x30"            Quadruped serratus punches 10x5"            Table taps  10x ea            Towel stretch for IR  5x10"                                                                                                                                                                            Modalities

## 2019-08-14 ENCOUNTER — OFFICE VISIT (OUTPATIENT)
Dept: PHYSICAL THERAPY | Facility: REHABILITATION | Age: 49
End: 2019-08-14
Payer: COMMERCIAL

## 2019-08-14 DIAGNOSIS — M75.51 SUBACROMIAL BURSITIS OF RIGHT SHOULDER JOINT: Primary | ICD-10-CM

## 2019-08-14 DIAGNOSIS — M25.511 RIGHT SHOULDER PAIN, UNSPECIFIED CHRONICITY: ICD-10-CM

## 2019-08-14 PROCEDURE — 97112 NEUROMUSCULAR REEDUCATION: CPT

## 2019-08-14 PROCEDURE — 97110 THERAPEUTIC EXERCISES: CPT

## 2019-08-14 PROCEDURE — 97140 MANUAL THERAPY 1/> REGIONS: CPT

## 2019-08-14 NOTE — PROGRESS NOTES
Daily Note     Today's date: 2019  Patient name: Fe Pyle  : 1970  MRN: [id number]  Referring provider: Manuel Apple DO  Dx:   Encounter Diagnosis     ICD-10-CM    1  Subacromial bursitis of right shoulder joint M75 51    2  Right shoulder pain, unspecified chronicity M25 511                   Subjective: Pt stated that he still has pain with certain movements, but has been able to sleep better  Pt stated that he has noticed an overall improvements  Pt reported discomfort with IR and sleeper stretch  Objective: See treatment diary below      Assessment: Tolerated treatment well  Patient would benefit from continued PT  Pt did well with progressed exercises with no complaints  No increases in pain during or after treatment  Reviewed HEP, pt understood  Pt 1:1 with ERNST/SPTCHHAYA entirety  Treated by SAMY Henderson under direct supervision from Brittany Lyle PTA  Plan: Continue per plan of care        Precautions: depression       Manual             R LAD HSS 2' HSS 2'           R shoulder ROM (flex, abd, IR) HSS 8' HSS 8'           Rhythmic stabilization in flexion, abduction  HSS 4' HSS 4'                                         Exercise Diary             UBE 2/2' 3/3'           TB rows, extensions 20x ea GTB 2x20 ea BTB           SL ER  20x 2x15 3#           Prone I,Y,T  20x ea 20x ea 2#            Sleeper stretch  3x30" 4x30"           Quadruped serratus punches 10x5" 10x5"           Table taps  10x ea 15x ea           Towel stretch for IR  5x10" 10x10"                                                                                                                                                                           Modalities

## 2019-08-27 ENCOUNTER — OFFICE VISIT (OUTPATIENT)
Dept: URGENT CARE | Facility: CLINIC | Age: 49
End: 2019-08-27
Payer: COMMERCIAL

## 2019-08-27 VITALS
DIASTOLIC BLOOD PRESSURE: 76 MMHG | OXYGEN SATURATION: 99 % | RESPIRATION RATE: 20 BRPM | HEIGHT: 66 IN | SYSTOLIC BLOOD PRESSURE: 132 MMHG | BODY MASS INDEX: 27.64 KG/M2 | WEIGHT: 172 LBS | TEMPERATURE: 98.3 F

## 2019-08-27 DIAGNOSIS — J02.9 SORE THROAT: ICD-10-CM

## 2019-08-27 DIAGNOSIS — J06.9 ACUTE URI: Primary | ICD-10-CM

## 2019-08-27 LAB — S PYO AG THROAT QL: NEGATIVE

## 2019-08-27 PROCEDURE — 87070 CULTURE OTHR SPECIMN AEROBIC: CPT | Performed by: PHYSICIAN ASSISTANT

## 2019-08-27 PROCEDURE — 87147 CULTURE TYPE IMMUNOLOGIC: CPT | Performed by: PHYSICIAN ASSISTANT

## 2019-08-27 PROCEDURE — 99213 OFFICE O/P EST LOW 20 MIN: CPT | Performed by: PHYSICIAN ASSISTANT

## 2019-08-27 RX ORDER — AZITHROMYCIN 250 MG/1
TABLET, FILM COATED ORAL
Qty: 6 TABLET | Refills: 0 | Status: SHIPPED | OUTPATIENT
Start: 2019-08-27 | End: 2019-08-31

## 2019-08-27 NOTE — PROGRESS NOTES
[facility]        NAME: Jennifer Gillespie is a 52 y o  male  : 1970    MRN: [id number]  DATE: 2019  TIME: 8:50 AM    Assessment and Plan   Acute URI [J06 9]  1  Acute URI  azithromycin (ZITHROMAX) 250 mg tablet   2  Sore throat  POCT rapid strepA    Throat culture         Patient Instructions       Follow up with PCP in 3-5 days  Proceed to  ER if symptoms worsen  Chief Complaint     Chief Complaint   Patient presents with    Sore Throat     for 2 days with chills, body aches         History of Present Illness       Patient presents with a 2 day history of body aches headaches and sore throat  He denies any nasal congestion but does have left ear pain  He denies fever chills chest pain shortness of breath nausea vomiting diarrhea  Review of Systems   Review of Systems   Constitutional: Negative for chills and fever  HENT: Positive for ear pain and sore throat  Negative for congestion, ear discharge, hearing loss and rhinorrhea  Respiratory: Negative for cough and wheezing  Gastrointestinal: Negative for diarrhea, nausea and vomiting  Musculoskeletal: Positive for myalgias  Skin: Negative for rash  Neurological: Positive for headaches  Hematological: Negative for adenopathy           Current Medications       Current Outpatient Medications:     amphetamine-dextroamphetamine (ADDERALL XR) 30 MG 24 hr capsule, Take 1 capsule (30 mg total) by mouth every morning Max Daily Amount: 30 mg, Disp: 30 capsule, Rfl: 0    ezetimibe (ZETIA) 10 mg tablet, Take 1 tablet (10 mg total) by mouth daily, Disp: 90 tablet, Rfl: 1    FLUoxetine (PROzac) 20 MG tablet, Take 1 tablet (20 mg total) by mouth daily, Disp: 90 tablet, Rfl: 1    Testosterone 30 MG/ACT SOLN, 4 ACT (120 MG TOTAL) BY PUMP ROUTE 4 (FOUR) TIMES A DAY, Disp: 540 mL, Rfl: 2    acyclovir (ZOVIRAX) 200 mg capsule, Take 1 capsule (200 mg total) by mouth 5 (five) times a day for 7 days, Disp: 35 capsule, Rfl: 0   azithromycin (ZITHROMAX) 250 mg tablet, Take 2 tablets today then 1 tablet daily x 4 days, Disp: 6 tablet, Rfl: 0    fluticasone (FLONASE) 50 mcg/act nasal spray, 2 sprays into each nostril daily (Patient not taking: Reported on 8/27/2019), Disp: 1 Bottle, Rfl: 0    promethazine-dextromethorphan (PHENERGAN-DM) 6 25-15 mg/5 mL oral syrup, Take 5 mL by mouth 4 (four) times a day as needed for cough (Patient not taking: Reported on 8/27/2019), Disp: 240 mL, Rfl: 0    Current Allergies     Allergies as of 08/27/2019 - Reviewed 08/27/2019   Allergen Reaction Noted    Adhesive [medical tape] Rash 04/11/2018            The following portions of the patient's history were reviewed and updated as appropriate: allergies, current medications, past family history, past medical history, past social history, past surgical history and problem list      Past Medical History:   Diagnosis Date    ADD (attention deficit disorder)     Anxiety     DDD (degenerative disc disease), lumbar 10/22/2018    Depression     ED (erectile dysfunction)     Hyperlipidemia        Past Surgical History:   Procedure Laterality Date    ELBOW SURGERY      NASAL SEPTUM SURGERY      VASECTOMY         Family History   Problem Relation Age of Onset    No Known Problems Mother     No Known Problems Father          Medications have been verified  Objective   /76   Temp 98 3 °F (36 8 °C) (Tympanic)   Resp 20   Ht 5' 6" (1 676 m)   Wt 78 kg (172 lb)   SpO2 99%   BMI 27 76 kg/m²        Physical Exam     Physical Exam   Constitutional: He is oriented to person, place, and time  He appears well-developed and well-nourished  HENT:   Head: Normocephalic and atraumatic  Right Ear: Tympanic membrane and ear canal normal    Left Ear: Tympanic membrane and ear canal normal    Mouth/Throat: Uvula is midline and mucous membranes are normal  No uvula swelling  Posterior oropharyngeal erythema present  No oropharyngeal exudate     Neck: Neck supple  Cardiovascular: Normal rate, regular rhythm and normal heart sounds  Pulmonary/Chest: Effort normal and breath sounds normal    Lymphadenopathy:     He has cervical adenopathy (Tender left anterior cervical node which is mobile  )  Neurological: He is alert and oriented to person, place, and time  Skin: Skin is warm and dry  Psychiatric: He has a normal mood and affect  His behavior is normal    Nursing note and vitals reviewed

## 2019-08-27 NOTE — PATIENT INSTRUCTIONS
Upper Respiratory Infection   AMBULATORY CARE:   An upper respiratory infection  is also called a common cold  It can affect your nose, throat, ears, and sinuses  Common signs and symptoms include the following:  Cold symptoms are usually worst for the first 3 to 5 days  You may have any of the following:  · Runny or stuffy nose    · Sneezing and coughing    · Sore throat or hoarseness    · Red, watery, and sore eyes    · Fatigue     · Chills and fever    · Headache, body aches, or sore muscles  Seek care immediately if:   · You have chest pain or trouble breathing  Contact your healthcare provider if:   · You have a fever over 102ºF (39°C)  · Your sore throat gets worse or you see white or yellow spots in your throat  · Your symptoms get worse after 3 to 5 days or your cold is not better in 14 days  · You have a rash anywhere on your skin  · You have large, tender lumps in your neck  · You have thick, green or yellow drainage from your nose  · You cough up thick yellow, green, or bloody mucus  · You have vomiting for more than 24 hours and cannot keep fluids down  · You have a bad earache  · You have questions or concerns about your condition or care  Treatment for a cold: There is no cure for the common cold  Colds are caused by viruses and do not get better with antibiotics  Most people get better in 7 to 14 days  You may continue to cough for 2 to 3 weeks  The following may help decrease your symptoms:  · Decongestants  help reduce nasal congestion and help you breathe more easily  If you take decongestant pills, they may make you feel restless or not able to sleep  Do not use decongestant sprays for more than a few days  · Cough suppressants  help reduce coughing  Ask your healthcare provider which type of cough medicine is best for you  · NSAIDs , such as ibuprofen, help decrease swelling, pain, and fever   NSAIDs can cause stomach bleeding or kidney problems in certain people  If you take blood thinner medicine, always ask your healthcare provider if NSAIDs are safe for you  Always read the medicine label and follow directions  · Acetaminophen  decreases pain and fever  It is available without a doctor's order  Ask how much to take and how often to take it  Follow directions  Read the labels of all other medicines you are using to see if they also contain acetaminophen, or ask your doctor or pharmacist  Acetaminophen can cause liver damage if not taken correctly  Do not use more than 4 grams (4,000 milligrams) total of acetaminophen in one day  Manage your cold:   · Rest as much as possible  Slowly start to do more each day  · Drink more liquids as directed  Liquids will help thin and loosen mucus so you can cough it up  Liquids will also help prevent dehydration  Liquids that help prevent dehydration include water, fruit juice, and broth  Do not drink liquids that contain caffeine  Caffeine can increase your risk for dehydration  Ask your healthcare provider how much liquid to drink each day  · Soothe a sore throat  Gargle with warm salt water  This helps your sore throat feel better  Make salt water by dissolving ¼ teaspoon salt in 1 cup warm water  You may also suck on hard candy or throat lozenges  You may use a sore throat spray  · Use a humidifier or vaporizer  Use a cool mist humidifier or a vaporizer to increase air moisture in your home  This may make it easier for you to breathe and help decrease your cough  · Use saline nasal drops as directed  These help relieve congestion  · Apply petroleum-based jelly around the outside of your nostrils  This can decrease irritation from blowing your nose  · Do not smoke  Nicotine and other chemicals in cigarettes and cigars can make your symptoms worse  They can also cause infections such as bronchitis or pneumonia   Ask your healthcare provider for information if you currently smoke and need help to quit  E-cigarettes or smokeless tobacco still contain nicotine  Talk to your healthcare provider before you use these products  Prevent spreading your cold to others:   · Try to stay away from other people during the first 2 to 3 days of your cold when it is more easily spread  · Do not share food or drinks  · Do not share hand towels with household members  · Wash your hands often, especially after you blow your nose  Turn away from other people and cover your mouth and nose with a tissue when you sneeze or cough  Follow up with your healthcare provider as directed:  Write down your questions so you remember to ask them during your visits  © 2017 [facility] Information is for End User's use only and may not be sold, redistributed or otherwise used for commercial purposes  All illustrations and images included in CareNotes® are the copyrighted property of [employer]  or [employer]  The above information is an  only  It is not intended as medical advice for individual conditions or treatments  Talk to your doctor, nurse or pharmacist before following any medical regimen to see if it is safe and effective for you

## 2019-08-29 LAB — BACTERIA THROAT CULT: ABNORMAL

## 2019-09-04 ENCOUNTER — OFFICE VISIT (OUTPATIENT)
Dept: INTERNAL MEDICINE CLINIC | Facility: CLINIC | Age: 49
End: 2019-09-04
Payer: COMMERCIAL

## 2019-09-04 VITALS
SYSTOLIC BLOOD PRESSURE: 122 MMHG | DIASTOLIC BLOOD PRESSURE: 82 MMHG | WEIGHT: 171.8 LBS | BODY MASS INDEX: 27.61 KG/M2 | RESPIRATION RATE: 16 BRPM | TEMPERATURE: 97.5 F | HEART RATE: 84 BPM | HEIGHT: 66 IN | OXYGEN SATURATION: 99 %

## 2019-09-04 DIAGNOSIS — J02.0 STREP THROAT: Primary | ICD-10-CM

## 2019-09-04 DIAGNOSIS — E78.00 PURE HYPERCHOLESTEROLEMIA: ICD-10-CM

## 2019-09-04 DIAGNOSIS — Z12.5 SCREENING PSA (PROSTATE SPECIFIC ANTIGEN): ICD-10-CM

## 2019-09-04 DIAGNOSIS — F90.2 ADHD (ATTENTION DEFICIT HYPERACTIVITY DISORDER), COMBINED TYPE: ICD-10-CM

## 2019-09-04 DIAGNOSIS — F32.A DEPRESSION, UNSPECIFIED DEPRESSION TYPE: ICD-10-CM

## 2019-09-04 PROBLEM — M51.369 DDD (DEGENERATIVE DISC DISEASE), LUMBAR: Status: RESOLVED | Noted: 2018-10-22 | Resolved: 2019-09-04

## 2019-09-04 PROBLEM — M51.36 DDD (DEGENERATIVE DISC DISEASE), LUMBAR: Status: RESOLVED | Noted: 2018-10-22 | Resolved: 2019-09-04

## 2019-09-04 PROBLEM — H93.8X2: Status: RESOLVED | Noted: 2018-09-06 | Resolved: 2019-09-04

## 2019-09-04 PROBLEM — M43.06 LUMBAR SPONDYLOLYSIS: Status: RESOLVED | Noted: 2018-11-05 | Resolved: 2019-09-04

## 2019-09-04 PROCEDURE — 3008F BODY MASS INDEX DOCD: CPT | Performed by: PHYSICIAN ASSISTANT

## 2019-09-04 PROCEDURE — 99214 OFFICE O/P EST MOD 30 MIN: CPT | Performed by: PHYSICIAN ASSISTANT

## 2019-09-04 RX ORDER — AMOXICILLIN 500 MG/1
500 CAPSULE ORAL EVERY 8 HOURS SCHEDULED
Qty: 30 CAPSULE | Refills: 0 | Status: SHIPPED | OUTPATIENT
Start: 2019-09-04 | End: 2019-09-14

## 2019-09-04 RX ORDER — ATOMOXETINE 40 MG/1
40 CAPSULE ORAL 2 TIMES DAILY
Qty: 180 CAPSULE | Refills: 0 | Status: SHIPPED | OUTPATIENT
Start: 2019-09-04 | End: 2019-10-22 | Stop reason: SDUPTHER

## 2019-09-04 RX ORDER — FLUOXETINE HYDROCHLORIDE 40 MG/1
40 CAPSULE ORAL DAILY
Qty: 30 CAPSULE | Refills: 2 | Status: SHIPPED | OUTPATIENT
Start: 2019-09-04 | End: 2020-04-13 | Stop reason: SDUPTHER

## 2019-09-04 RX ORDER — ATOMOXETINE 40 MG/1
CAPSULE ORAL
COMMUNITY
Start: 2019-07-13 | End: 2019-09-04 | Stop reason: SDUPTHER

## 2019-09-04 NOTE — PROGRESS NOTES
Assessment/Plan:  Problem List Items Addressed This Visit        Digestive    Strep throat - Primary     Start amoxil  The patient was instructed to change their toothbrush to avoid reinfection  Relevant Medications    amoxicillin (AMOXIL) 500 mg capsule       Other    Pure hypercholesterolemia     Improving with current regime  Relevant Orders    Lipid panel    ADHD (attention deficit hyperactivity disorder), combined type     Pts symptoms are stable with current regime  No changes at present  Relevant Medications    atoMOXetine (STRATTERA) 40 mg capsule    FLUoxetine (PROzac) 40 MG capsule    Depression    Relevant Medications    atoMOXetine (STRATTERA) 40 mg capsule    FLUoxetine (PROzac) 40 MG capsule      Other Visit Diagnoses     Screening PSA (prostate specific antigen)        Relevant Orders    PSA, Total Screen           Diagnoses and all orders for this visit:    Strep throat  -     amoxicillin (AMOXIL) 500 mg capsule; Take 1 capsule (500 mg total) by mouth every 8 (eight) hours for 10 days    Screening PSA (prostate specific antigen)  -     PSA, Total Screen    ADHD (attention deficit hyperactivity disorder), combined type  -     atoMOXetine (STRATTERA) 40 mg capsule; Take 1 capsule (40 mg total) by mouth 2 (two) times a day    Pure hypercholesterolemia  -     Lipid panel; Future    Depression, unspecified depression type  -     FLUoxetine (PROzac) 40 MG capsule; Take 1 capsule (40 mg total) by mouth daily    Other orders  -     Discontinue: atoMOXetine (STRATTERA) 40 mg capsule        Strep throat  Start amoxil  The patient was instructed to change their toothbrush to avoid reinfection  ADHD (attention deficit hyperactivity disorder), combined type  Pts symptoms are stable with current regime  No changes at present  Pure hypercholesterolemia  Improving with current regime  Subjective:      Patient ID: Tristan Tobin is a 52 y o  male      Pt presents for routine visit  Recent labs reviewed and all are normal  LDL has improved with zetia use  He was recently in urgent care for cold symptoms  He was given rx for zithromax but told not to take it if he felt he didn't need it  He never did start this  He did have a strep culture performed that was positive but he was not notified of this  Will start pt with amoxil for this  The following portions of the patient's history were reviewed and updated as appropriate:   He has a past medical history of ADD (attention deficit disorder), Anxiety, DDD (degenerative disc disease), lumbar (10/22/2018), Depression, ED (erectile dysfunction), and Hyperlipidemia ,  does not have any pertinent problems on file  ,   has a past surgical history that includes Elbow surgery; Vasectomy; and Nasal septum surgery  ,  family history includes No Known Problems in his father and mother  ,   reports that he has never smoked  He has never used smokeless tobacco  He reports that he drinks alcohol  He reports that he does not use drugs  ,  is allergic to adhesive [medical tape]     Current Outpatient Medications   Medication Sig Dispense Refill    acyclovir (ZOVIRAX) 200 mg capsule Take 1 capsule (200 mg total) by mouth 5 (five) times a day for 7 days 35 capsule 0    ezetimibe (ZETIA) 10 mg tablet Take 1 tablet (10 mg total) by mouth daily 90 tablet 1    Testosterone 30 MG/ACT SOLN 4 ACT (120 MG TOTAL) BY PUMP ROUTE 4 (FOUR) TIMES A  mL 2    amoxicillin (AMOXIL) 500 mg capsule Take 1 capsule (500 mg total) by mouth every 8 (eight) hours for 10 days 30 capsule 0    atoMOXetine (STRATTERA) 40 mg capsule Take 1 capsule (40 mg total) by mouth 2 (two) times a day 180 capsule 0    FLUoxetine (PROzac) 40 MG capsule Take 1 capsule (40 mg total) by mouth daily 30 capsule 2     No current facility-administered medications for this visit  Review of Systems   Constitutional: Negative for chills and fever     HENT: Positive for congestion, postnasal drip, rhinorrhea and sinus pressure  Negative for ear pain, hearing loss, sinus pain, sore throat and trouble swallowing  Eyes: Negative for pain and visual disturbance  Respiratory: Negative for cough, chest tightness, shortness of breath and wheezing  Cardiovascular: Negative  Negative for chest pain, palpitations and leg swelling  Gastrointestinal: Negative for abdominal pain, blood in stool, constipation, diarrhea, nausea and vomiting  Endocrine: Negative for cold intolerance, heat intolerance, polydipsia, polyphagia and polyuria  Genitourinary: Negative for difficulty urinating, dysuria, flank pain and urgency  Musculoskeletal: Negative for arthralgias, back pain, gait problem and myalgias  Skin: Negative for rash  Allergic/Immunologic: Negative  Neurological: Negative for dizziness, weakness, light-headedness and headaches  Hematological: Negative  Psychiatric/Behavioral: Negative for behavioral problems, dysphoric mood and sleep disturbance  The patient is not nervous/anxious  PHQ-9 Depression Screening    PHQ-9:    Frequency of the following problems over the past two weeks:       Little interest or pleasure in doing things:  0 - not at all  Feeling down, depressed, or hopeless:  0 - not at all  PHQ-2 Score:  0          Objective:  Vitals:    09/04/19 0909   BP: 122/82   BP Location: Left arm   Patient Position: Sitting   Cuff Size: Adult   Pulse: 84   Resp: 16   Temp: 97 5 °F (36 4 °C)   TempSrc: Tympanic   SpO2: 99%   Weight: 77 9 kg (171 lb 12 8 oz)   Height: 5' 6" (1 676 m)     Body mass index is 27 73 kg/m²  Physical Exam   Constitutional: He is oriented to person, place, and time  He appears well-developed and well-nourished  No distress  HENT:   Head: Normocephalic and atraumatic     Right Ear: External ear normal    Left Ear: External ear normal    Nose: Nose normal    Mouth/Throat: Oropharyngeal exudate and posterior oropharyngeal erythema present  Eyes: Pupils are equal, round, and reactive to light  Conjunctivae and EOM are normal  Right eye exhibits no discharge  Left eye exhibits no discharge  No scleral icterus  Neck: Normal range of motion  Neck supple  No thyromegaly present  Cardiovascular: Normal rate, regular rhythm and normal heart sounds  Exam reveals no gallop and no friction rub  No murmur heard  Pulmonary/Chest: Effort normal and breath sounds normal  No respiratory distress  He has no wheezes  He has no rales  Abdominal: Soft  Bowel sounds are normal  He exhibits no distension  There is no tenderness  Musculoskeletal: Normal range of motion  He exhibits no edema, tenderness or deformity  Neurological: He is alert and oriented to person, place, and time  No cranial nerve deficit  Skin: Skin is warm and dry  He is not diaphoretic  Psychiatric: He has a normal mood and affect   His behavior is normal  Judgment and thought content normal

## 2019-09-09 NOTE — PROGRESS NOTES
Patient will be D/C due to noncompliance  No formal re-evaluation performed and goals were not assessed

## 2019-09-15 DIAGNOSIS — R79.89 LOW TESTOSTERONE: ICD-10-CM

## 2019-09-16 RX ORDER — TESTOSTERONE 30 MG/1.5ML
4 SOLUTION TOPICAL 4 TIMES DAILY
Qty: 540 ML | Refills: 5 | Status: SHIPPED | OUTPATIENT
Start: 2019-09-16 | End: 2020-05-05 | Stop reason: SDUPTHER

## 2019-10-22 DIAGNOSIS — F90.2 ADHD (ATTENTION DEFICIT HYPERACTIVITY DISORDER), COMBINED TYPE: ICD-10-CM

## 2019-10-23 RX ORDER — ATOMOXETINE 40 MG/1
40 CAPSULE ORAL 2 TIMES DAILY
Qty: 180 CAPSULE | Refills: 0 | Status: SHIPPED | OUTPATIENT
Start: 2019-10-23 | End: 2020-01-16

## 2019-11-15 ENCOUNTER — OFFICE VISIT (OUTPATIENT)
Dept: INTERNAL MEDICINE CLINIC | Facility: CLINIC | Age: 49
End: 2019-11-15
Payer: COMMERCIAL

## 2019-11-15 VITALS
SYSTOLIC BLOOD PRESSURE: 120 MMHG | HEART RATE: 78 BPM | DIASTOLIC BLOOD PRESSURE: 80 MMHG | TEMPERATURE: 98 F | RESPIRATION RATE: 16 BRPM | WEIGHT: 171 LBS | BODY MASS INDEX: 27.48 KG/M2 | HEIGHT: 66 IN

## 2019-11-15 DIAGNOSIS — S76.211A GROIN STRAIN, RIGHT, INITIAL ENCOUNTER: Primary | ICD-10-CM

## 2019-11-15 PROBLEM — S76.219A GROIN STRAIN: Status: ACTIVE | Noted: 2019-11-15

## 2019-11-15 PROCEDURE — 1036F TOBACCO NON-USER: CPT | Performed by: PHYSICIAN ASSISTANT

## 2019-11-15 PROCEDURE — 99214 OFFICE O/P EST MOD 30 MIN: CPT | Performed by: PHYSICIAN ASSISTANT

## 2019-11-15 RX ORDER — METHYLPREDNISOLONE 4 MG/1
TABLET ORAL
Qty: 21 EACH | Refills: 0 | Status: SHIPPED | OUTPATIENT
Start: 2019-11-15 | End: 2020-04-17 | Stop reason: ALTCHOICE

## 2019-11-15 RX ORDER — DICLOFENAC SODIUM 75 MG/1
75 TABLET, DELAYED RELEASE ORAL 2 TIMES DAILY
Qty: 60 TABLET | Refills: 2 | Status: SHIPPED | OUTPATIENT
Start: 2019-11-15 | End: 2020-04-17 | Stop reason: ALTCHOICE

## 2019-11-15 NOTE — PROGRESS NOTES
Assessment/Plan:  Problem List Items Addressed This Visit     None      Visit Diagnoses     Groin strain, right, initial encounter    -  Primary    Relevant Medications    methylPREDNISolone 4 MG tablet therapy pack    diclofenac (VOLTAREN) 75 mg EC tablet    Other Relevant Orders    XR hip/pelv 2-3 vws right if performed           Diagnoses and all orders for this visit:    Groin strain, right, initial encounter  -     methylPREDNISolone 4 MG tablet therapy pack; Use as directed on package  -     diclofenac (VOLTAREN) 75 mg EC tablet; Take 1 tablet (75 mg total) by mouth 2 (two) times a day  -     XR hip/pelv 2-3 vws right if performed; Future        No problem-specific Assessment & Plan notes found for this encounter  Subjective:      Patient ID: Otoniel Nunez is a 52 y o  male  Groin Pain   The patient's pertinent negatives include no genital itching, genital lesions, penile pain, scrotal swelling or testicular pain  This is a recurrent problem  The current episode started more than 1 month ago  The problem occurs intermittently  The problem has been gradually improving  The pain is medium  Pertinent negatives include no abdominal pain, chest pain, chills, constipation, coughing, diarrhea, dysuria, fever, flank pain, headaches, nausea, rash, shortness of breath, sore throat, urgency or vomiting  Exacerbated by: running  He has tried OTC analgesics and rest for the symptoms  The treatment provided mild relief  The following portions of the patient's history were reviewed and updated as appropriate:   He has a past medical history of ADD (attention deficit disorder), Anxiety, DDD (degenerative disc disease), lumbar (10/22/2018), Depression, ED (erectile dysfunction), Groin strain, and Hyperlipidemia ,  does not have any pertinent problems on file  ,   has a past surgical history that includes Elbow surgery; Vasectomy; and Nasal septum surgery  ,  family history includes No Known Problems in his father and mother  ,   reports that he has never smoked  He has never used smokeless tobacco  He reports that he drinks alcohol  He reports that he does not use drugs  ,  is allergic to adhesive [medical tape]     Current Outpatient Medications   Medication Sig Dispense Refill    acyclovir (ZOVIRAX) 200 mg capsule Take 1 capsule (200 mg total) by mouth 5 (five) times a day for 7 days 35 capsule 0    atoMOXetine (STRATTERA) 40 mg capsule Take 1 capsule (40 mg total) by mouth 2 (two) times a day 180 capsule 0    ezetimibe (ZETIA) 10 mg tablet Take 1 tablet (10 mg total) by mouth daily 90 tablet 1    FLUoxetine (PROzac) 40 MG capsule Take 1 capsule (40 mg total) by mouth daily (Patient taking differently: Take 20 mg by mouth daily ) 30 capsule 2    Testosterone 30 MG/ACT SOLN 4 Act (120 mg total) by Pump route 4 (four) times a day 540 mL 5    diclofenac (VOLTAREN) 75 mg EC tablet Take 1 tablet (75 mg total) by mouth 2 (two) times a day 60 tablet 2    methylPREDNISolone 4 MG tablet therapy pack Use as directed on package 21 each 0     No current facility-administered medications for this visit  Review of Systems   Constitutional: Negative for chills and fever  HENT: Negative for congestion, ear pain, hearing loss, postnasal drip, rhinorrhea, sinus pressure, sinus pain, sore throat and trouble swallowing  Eyes: Negative for pain and visual disturbance  Respiratory: Negative for cough, chest tightness, shortness of breath and wheezing  Cardiovascular: Negative  Negative for chest pain, palpitations and leg swelling  Gastrointestinal: Negative for abdominal pain, blood in stool, constipation, diarrhea, nausea and vomiting  Endocrine: Negative for cold intolerance, heat intolerance, polydipsia, polyphagia and polyuria  Genitourinary: Negative for difficulty urinating, dysuria, flank pain, penile pain, scrotal swelling, testicular pain and urgency     Musculoskeletal: Negative for arthralgias, back pain, gait problem and myalgias  Skin: Negative for rash  Allergic/Immunologic: Negative  Neurological: Negative for dizziness, weakness, light-headedness and headaches  Hematological: Negative  Psychiatric/Behavioral: Negative for behavioral problems, dysphoric mood and sleep disturbance  The patient is not nervous/anxious  PHQ-9 Depression Screening    PHQ-9:    Frequency of the following problems over the past two weeks:       Little interest or pleasure in doing things:  0 - not at all  Feeling down, depressed, or hopeless:  0 - not at all  Trouble falling or staying asleep, or sleeping too much:  0 - not at all  Feeling tired or having little energy:  0 - not at all  Poor appetite or overeatin - not at all  Feeling bad about yourself - or that you are a failure or have let yourself or your family down:  0 - not at all  Trouble concentrating on things, such as reading the newspaper or watching television:  0 - not at all  Moving or speaking so slowly that other people could have noticed  Or the opposite - being so fidgety or restless that you have been moving around a lot more than usual:  0 - not at all  Thoughts that you would be better off dead, or of hurting yourself in some way:  0 - not at all  PHQ-2 Score:  0  PHQ-9 Score:  0          Objective:  Vitals:    11/15/19 1040   BP: 120/80   BP Location: Left arm   Patient Position: Sitting   Cuff Size: Adult   Pulse: 78   Resp: 16   Temp: 98 °F (36 7 °C)   TempSrc: Tympanic   Weight: 77 6 kg (171 lb)   Height: 5' 6" (1 676 m)     Body mass index is 27 6 kg/m²  Physical Exam   Constitutional: He is oriented to person, place, and time  He appears well-developed and well-nourished  No distress  HENT:   Head: Normocephalic and atraumatic  Right Ear: External ear normal    Left Ear: External ear normal    Nose: Nose normal    Mouth/Throat: Oropharynx is clear and moist  No oropharyngeal exudate     Eyes: Pupils are equal, round, and reactive to light  Conjunctivae and EOM are normal  Right eye exhibits no discharge  Left eye exhibits no discharge  No scleral icterus  Neck: Normal range of motion  Neck supple  No thyromegaly present  Cardiovascular: Normal rate, regular rhythm and normal heart sounds  Exam reveals no gallop and no friction rub  No murmur heard  Pulmonary/Chest: Effort normal and breath sounds normal  No respiratory distress  He has no wheezes  He has no rales  Abdominal: Soft  Bowel sounds are normal  He exhibits no distension  There is no tenderness  Musculoskeletal: Normal range of motion  He exhibits no edema, tenderness or deformity  Legs:  Neurological: He is alert and oriented to person, place, and time  No cranial nerve deficit  Skin: Skin is warm and dry  He is not diaphoretic  Psychiatric: He has a normal mood and affect  His behavior is normal  Judgment and thought content normal      BMI Counseling: Body mass index is 27 6 kg/m²  The BMI is above normal  Nutrition recommendations include decreasing portion sizes, consuming healthier snacks and limiting drinks that contain sugar

## 2019-11-15 NOTE — ASSESSMENT & PLAN NOTE
Hernia exam negative  Will treat as groin strain with anti-inflammatory and steroid taper  Xray ordered  If no response consider ultrasound to definitively rule out hernia

## 2019-11-16 ENCOUNTER — TRANSCRIBE ORDERS (OUTPATIENT)
Dept: URGENT CARE | Facility: CLINIC | Age: 49
End: 2019-11-16

## 2019-11-16 ENCOUNTER — APPOINTMENT (OUTPATIENT)
Dept: RADIOLOGY | Facility: CLINIC | Age: 49
End: 2019-11-16
Payer: COMMERCIAL

## 2019-11-16 DIAGNOSIS — S76.211A GROIN STRAIN, RIGHT, INITIAL ENCOUNTER: ICD-10-CM

## 2019-11-16 PROCEDURE — 73502 X-RAY EXAM HIP UNI 2-3 VIEWS: CPT

## 2019-12-05 ENCOUNTER — EVALUATION (OUTPATIENT)
Dept: PHYSICAL THERAPY | Facility: CLINIC | Age: 49
End: 2019-12-05
Payer: COMMERCIAL

## 2019-12-05 DIAGNOSIS — Z98.890 S/P SHOULDER SURGERY: Primary | ICD-10-CM

## 2019-12-05 PROCEDURE — 97140 MANUAL THERAPY 1/> REGIONS: CPT | Performed by: PHYSICAL THERAPIST

## 2019-12-05 PROCEDURE — 97535 SELF CARE MNGMENT TRAINING: CPT | Performed by: PHYSICAL THERAPIST

## 2019-12-05 PROCEDURE — 97162 PT EVAL MOD COMPLEX 30 MIN: CPT | Performed by: PHYSICAL THERAPIST

## 2019-12-05 NOTE — LETTER
2019    MD Mann Aquino 3 [address] [facility]    Patient: Brandon Medrano   YOB: 1970   Date of Visit: 2019     Encounter Diagnosis     ICD-10-CM    1  S/P shoulder surgery Z98 890        Dear Dr Santacruz Nelsy:    Thank you for your recent referral of Brandon Medrano  Please review the attached evaluation summary from Merrick's recent visit  Please verify that you agree with the plan of care by signing the attached order  If you have any questions or concerns, please do not hesitate to call  I sincerely appreciate the opportunity to share in the care of one of your patients and hope to have another opportunity to work with you in the near future  Sincerely,    Nilsa May, PT      Referring Provider:      I certify that I have read the below Plan of Care and certify the need for these services furnished under this plan of treatment while under my care  MD Mann Aquino [facility] [phone]          PT Evaluation     Today's date: 2019  Patient name: Brandon Medrano  : 1970  MRN: [id number]  Referring provider: Lawton Councilman, MD  Dx:   Encounter Diagnosis     ICD-10-CM    1  S/P shoulder surgery Z98 890                   Assessment  Assessment details: Brandon Medrano is a 52 y o  male who presents to PT S/P R shoulder surgery ("Rotation Medical Patch procedure) POD1  No further referral appears necessary at this time based upon examination results  Dressings were removed and patient was instructed on HEP, demonstrated good technique with exercises  Per protocol patient is to remain in sling for at least one more day, the wean from sling over the next 5 days  Patient currently with limited use of R UE  Presents with moderate to severe ROM deficits  Strength testing was not performed this date due to being acute surgical procedure  Prognosis is good given HEP compliance and PT 2x/wk  Please contact me if you have any questions or recommendations  Thank you for the opportunity to share in  Merrick's care  Impairments: abnormal or restricted ROM, activity intolerance, impaired physical strength and pain with function  Understanding of Dx/Px/POC: good   Prognosis: good    Goals  STGs  1) In 4 weeks patient will demonstrate PROM R shoulder WNL  2) In 4 weeks patient will report pain levels <3/10  3) In 4 weeks patient will report able to sleep through the night without waking from pain    LTGs  1) In 6-8 weeks patient will demonstrate full AROM R shoulder pain free  2) In 6-8 weeks patient will report no difficulty with ADLs and IADLs, pain free  3) In 6-8 weeks patient will demonstrate independence with HEP and readiness to DC to gym/self maintenance program      Plan  Patient would benefit from: skilled physical therapy  Referral necessary: No  Planned modality interventions: cryotherapy, TENS and unattended electrical stimulation  Planned therapy interventions: manual therapy, joint mobilization, massage, Solano taping, activity modification, body mechanics training, neuromuscular re-education, patient education, postural training, self care, strengthening, stretching, flexibility, functional ROM exercises, home exercise program and therapeutic exercise  Frequency: 2x week  Duration in weeks: 4  Plan of Care beginning date: 12/5/2019  Plan of Care expiration date: 1/2/2020  Treatment plan discussed with: patient and family        Subjective Evaluation    History of Present Illness  Mechanism of injury: Patient c/o R shoulder pain for several months  Reports having a short bout of conservative PT but with no improvement  Underwent surgery on R shoulder yesterday, "Rotation Medical Patch" procedure for rotator cuff tear  Denies any complications with surgery  Using Oxycodone as prescribed for pain control   Patient sleeping in recliner with sling  RTD on 19 for suture removal   Pain  Current pain rating: 3  At best pain ratin  At worst pain rating: 3    Social Support  Lives with: spouse    Employment status: working  Hand dominance: right  Exercise history: Prior to injury patient was active with weight lifting and running    Treatments  Previous treatment: physical therapy  Current treatment: medication and physical therapy  Patient Goals  Patient goals for therapy: decreased pain, increased motion and increased strength          Objective     Observations     Right Shoulder  Positive for incision  Additional Observation Details  Dressings removed  4 portal sites with sutures intact  Light bleeding over 2 anterior sites  Covered all sites with sterile bandages  Educated patient on s/s of infection and to contact MD if suspect infection  Educated on daily check of sites and daily change of bandages  Passive Range of Motion     Right Shoulder   Flexion: 135 degrees   Abduction: 75 degrees with pain  External rotation 45°:  0 degrees with pain  Internal rotation 0°: Right shoulder passive internal rotation at 0 degrees: hand to belly   with pain    Strength/Myotome Testing     Additional Strength Details  Not appropriate for MMT at time of initial evaluation             Precautions: s/p R shoulder surgery (DOS 19) - follow MD protocol  Re-cert due 0/3/12  Manual         R shoulder ROM to marquis x10 mins                                           Exercise Diary         HEP instruction Pendulums, shrugs, retractions, AAROM flexion and ER w/cane, elbow and wrist AROM    Complete HEP at NV       Progress to TE below beginning 19        Pulleys        Finger ladder        Cane AAROM flexion and ER        Wall slides        Sidelying AROM flexion        Sidelying AROM IR/ER        Isometric shoulder flex/abd/ext/IR/ER                                                                                                    Modalities 12/5       CP post tx                          Patient was provided a home exercise program and demonstrated an understanding of exercises  Patient was advised to stop performing home exercise program if symptoms increase or new complaints developed  Verbal understanding demonstrated regarding home exercise program instructions

## 2019-12-05 NOTE — PROGRESS NOTES
PT Evaluation     Today's date: 2019  Patient name: Marlin Francisco  : 1970  MRN: [id number]  Referring provider: Iva Hanson MD  Dx:   Encounter Diagnosis     ICD-10-CM    1  S/P shoulder surgery Z98 890                   Assessment  Assessment details: Marlin Francisco is a 52 y o  male who presents to PT S/P R shoulder surgery ("Rotation Medical Patch procedure) POD1  No further referral appears necessary at this time based upon examination results  Dressings were removed and patient was instructed on HEP, demonstrated good technique with exercises  Per protocol patient is to remain in sling for at least one more day, the wean from sling over the next 5 days  Patient currently with limited use of R UE  Presents with moderate to severe ROM deficits  Strength testing was not performed this date due to being acute surgical procedure  Prognosis is good given HEP compliance and PT 2x/wk  Please contact me if you have any questions or recommendations  Thank you for the opportunity to share in  Merrick's care       Impairments: abnormal or restricted ROM, activity intolerance, impaired physical strength and pain with function  Understanding of Dx/Px/POC: good   Prognosis: good    Goals  STGs  1) In 4 weeks patient will demonstrate PROM R shoulder WNL  2) In 4 weeks patient will report pain levels <3/10  3) In 4 weeks patient will report able to sleep through the night without waking from pain    LTGs  1) In 6-8 weeks patient will demonstrate full AROM R shoulder pain free  2) In 6-8 weeks patient will report no difficulty with ADLs and IADLs, pain free  3) In 6-8 weeks patient will demonstrate independence with HEP and readiness to DC to gym/self maintenance program      Plan  Patient would benefit from: skilled physical therapy  Referral necessary: No  Planned modality interventions: cryotherapy, TENS and unattended electrical stimulation  Planned therapy interventions: manual therapy, joint mobilization, massage, Solano taping, activity modification, body mechanics training, neuromuscular re-education, patient education, postural training, self care, strengthening, stretching, flexibility, functional ROM exercises, home exercise program and therapeutic exercise  Frequency: 2x week  Duration in weeks: 4  Plan of Care beginning date: 2019  Plan of Care expiration date: 2020  Treatment plan discussed with: patient and family        Subjective Evaluation    History of Present Illness  Mechanism of injury: Patient c/o R shoulder pain for several months  Reports having a short bout of conservative PT but with no improvement  Underwent surgery on R shoulder yesterday, "Rotation Medical Patch" procedure for rotator cuff tear  Denies any complications with surgery  Using Oxycodone as prescribed for pain control  Patient sleeping in recliner with sling  RTD on 19 for suture removal   Pain  Current pain rating: 3  At best pain ratin  At worst pain rating: 3    Social Support  Lives with: spouse    Employment status: working  Hand dominance: right  Exercise history: Prior to injury patient was active with weight lifting and running    Treatments  Previous treatment: physical therapy  Current treatment: medication and physical therapy  Patient Goals  Patient goals for therapy: decreased pain, increased motion and increased strength          Objective     Observations     Right Shoulder  Positive for incision  Additional Observation Details  Dressings removed  4 portal sites with sutures intact  Light bleeding over 2 anterior sites  Covered all sites with sterile bandages  Educated patient on s/s of infection and to contact MD if suspect infection  Educated on daily check of sites and daily change of bandages       Passive Range of Motion     Right Shoulder   Flexion: 135 degrees   Abduction: 75 degrees with pain  External rotation 45°: 0 degrees with pain  Internal rotation 0°: Right shoulder passive internal rotation at 0 degrees: hand to belly  with pain    Strength/Myotome Testing     Additional Strength Details  Not appropriate for MMT at time of initial evaluation             Precautions: s/p R shoulder surgery (DOS 12/4/19) - follow MD protocol  Re-cert due 7/6/16  Manual  12/5       R shoulder ROM to marquis x10 mins                                           Exercise Diary  12/5       HEP instruction Pendulums, shrugs, retractions, AAROM flexion and ER w/cane, elbow and wrist AROM    Complete HEP at NV       Progress to TE below beginning 12/11/19        Pulleys        Finger ladder        Cane AAROM flexion and ER        Wall slides        Sidelying AROM flexion        Sidelying AROM IR/ER        Isometric shoulder flex/abd/ext/IR/ER                                                                                                    Modalities  12/5       CP post tx                          Patient was provided a home exercise program and demonstrated an understanding of exercises  Patient was advised to stop performing home exercise program if symptoms increase or new complaints developed  Verbal understanding demonstrated regarding home exercise program instructions

## 2019-12-09 ENCOUNTER — OFFICE VISIT (OUTPATIENT)
Dept: PHYSICAL THERAPY | Facility: CLINIC | Age: 49
End: 2019-12-09
Payer: COMMERCIAL

## 2019-12-09 DIAGNOSIS — Z98.890 S/P SHOULDER SURGERY: Primary | ICD-10-CM

## 2019-12-09 PROCEDURE — 97110 THERAPEUTIC EXERCISES: CPT | Performed by: PHYSICAL THERAPIST

## 2019-12-09 PROCEDURE — 97140 MANUAL THERAPY 1/> REGIONS: CPT | Performed by: PHYSICAL THERAPIST

## 2019-12-09 NOTE — PROGRESS NOTES
Daily Note     Today's date: 2019  Patient name: Carlos Perkins  : 1970  MRN: [id number]  Referring provider: Pernell Orantes MD  Dx:   Encounter Diagnosis     ICD-10-CM    1  S/P shoulder surgery Z98 890                   Subjective: Patient reports pain is better today than it was last week  Did not take rx pain medication since Friday  Took Tylenol over the weekend  No pain meds this morning  Objective: See treatment diary below      Assessment: Tolerated treatment fair  Pain expressed with shoulder flexion ROM  Patient exhibited good technique with therapeutic exercises and would benefit from continued PT      Plan: Continue per plan of care  Progress treatment as tolerated  Progress through MD protocol as outlined below        Precautions: s/p R shoulder surgery (DOS 19) - follow MD protocol  Re-cert due 29  Manual        R shoulder ROM to marquis x10 mins x15 mins                                          Exercise Diary        HEP instruction Pendulums, shrugs, retractions, AAROM flexion and ER w/cane, elbow and wrist AROM    Complete HEP at NV Pendulums 4 ways 1 min x2 ea dir    Shrugs x30  Retractions x30    Supine cane AAROM flexion x20, ER x20      Progress to TE below beginning 19        Pulleys        Finger ladder        Cane AAROM flexion and ER        Wall slides        Sidelying AROM flexion        Sidelying AROM IR/ER        Isometric shoulder flex/abd/ext/IR/ER                                                                                                    Modalities        CP post tx  x10 mins

## 2019-12-10 ENCOUNTER — TRANSCRIBE ORDERS (OUTPATIENT)
Dept: PHYSICAL THERAPY | Facility: CLINIC | Age: 49
End: 2019-12-10

## 2019-12-10 DIAGNOSIS — Z98.890 S/P SHOULDER SURGERY: Primary | ICD-10-CM

## 2019-12-12 ENCOUNTER — TELEPHONE (OUTPATIENT)
Dept: INTERNAL MEDICINE CLINIC | Facility: CLINIC | Age: 49
End: 2019-12-12

## 2019-12-12 ENCOUNTER — OFFICE VISIT (OUTPATIENT)
Dept: PHYSICAL THERAPY | Facility: CLINIC | Age: 49
End: 2019-12-12
Payer: COMMERCIAL

## 2019-12-12 DIAGNOSIS — G25.81 RLS (RESTLESS LEGS SYNDROME): Primary | ICD-10-CM

## 2019-12-12 DIAGNOSIS — Z98.890 S/P SHOULDER SURGERY: Primary | ICD-10-CM

## 2019-12-12 PROCEDURE — 97110 THERAPEUTIC EXERCISES: CPT

## 2019-12-12 PROCEDURE — 97140 MANUAL THERAPY 1/> REGIONS: CPT

## 2019-12-12 RX ORDER — PRAMIPEXOLE DIHYDROCHLORIDE 1 MG/1
1 TABLET ORAL
Qty: 30 TABLET | Refills: 2 | Status: SHIPPED | OUTPATIENT
Start: 2019-12-12 | End: 2019-12-23

## 2019-12-12 NOTE — PROGRESS NOTES
Daily Note     Today's date: 2019  Patient name: Vito Aguirre  : 1970  MRN: [id number]  Referring provider: Bony Vargas MD  Dx:   Encounter Diagnosis     ICD-10-CM    1  S/P shoulder surgery Z98 890                   Subjective: "I have been pushing myself at home a bit " Pt denies pain at rest        Objective: See treatment diary below      Assessment: Tolerated treatment well  Pt one week post op  Initiated multiple exercises ad per MD protocol  No exacerbation of pain this date  Patient demonstrated fatigue post treatment and would benefit from continued PT  No adverse affect to Cp this date  Plan: Continue per plan of care  Progress treatment as tolerated  Precautions: s/p R shoulder surgery (DOS 19) - follow MD protocol  Re-cert due 76  Manual       R shoulder ROM to marquis x10 mins x15 mins x15 min                                         Exercise Diary       HEP instruction Pendulums, shrugs, retractions, AAROM flexion and ER w/cane, elbow and wrist AROM    Complete HEP at NV Pendulums 4 ways 1 min x2 ea dir    Shrugs x30  Retractions x30    Supine cane AAROM flexion x20, ER x20 Reviewed and performed this date      DC to HEP NV     Progress to TE below beginning 19        Pulleys   10" x10 Flex/Scap     Finger ladder   NV     Cane AAROM flexion and ER   NV     Wall slides   ITY x10 ea     Sidelying AROM flexion        Sidelying AROM IR/ER        Isometric shoulder flex/abd/ext/IR/ER   5" x10 ea Flex/ABD/ IR/ER                                                                                                 Modalities       CP post tx  x10 mins x10 min

## 2019-12-12 NOTE — TELEPHONE ENCOUNTER
Pt called stating that he messaged you through [employer]  He has had restless leg the past 3 nights and wants to know if you want to see him or send something in for him

## 2019-12-13 DIAGNOSIS — E78.00 PURE HYPERCHOLESTEROLEMIA: ICD-10-CM

## 2019-12-13 RX ORDER — EZETIMIBE 10 MG/1
TABLET ORAL
Qty: 90 TABLET | Refills: 1 | Status: SHIPPED | OUTPATIENT
Start: 2019-12-13 | End: 2019-12-20 | Stop reason: SDUPTHER

## 2019-12-17 ENCOUNTER — OFFICE VISIT (OUTPATIENT)
Dept: PHYSICAL THERAPY | Facility: CLINIC | Age: 49
End: 2019-12-17
Payer: COMMERCIAL

## 2019-12-17 DIAGNOSIS — Z98.890 S/P SHOULDER SURGERY: Primary | ICD-10-CM

## 2019-12-17 PROCEDURE — 97110 THERAPEUTIC EXERCISES: CPT

## 2019-12-17 PROCEDURE — 97140 MANUAL THERAPY 1/> REGIONS: CPT

## 2019-12-17 NOTE — PROGRESS NOTES
Daily Note     Today's date: 2019  Patient name: Madhav Grimm  : 1970  MRN: [id number]  Referring provider: Chao Blum MD  Dx:   Encounter Diagnosis     ICD-10-CM    1  S/P shoulder surgery Z98 890                   Subjective: Pt denies significant pain R shoulder at rest, discomfort  with reaching forward with R arm to tune radio  Reports he had doctor appt today and was released to return to work 19  Objective: See treatment diary below      Assessment: Tolerated treatment well  Patient exhibited good technique with therapeutic exercises and would benefit from continued PT as per protocol  Plan: Continue per plan of care  Precautions: s/p R shoulder surgery (DOS 19) - follow MD protocol  Re-cert due   Manual      R shoulder ROM to marquis x10 mins x15 mins x15 min x10 min                                        Exercise Diary      HEP instruction Pendulums, shrugs, retractions, AAROM flexion and ER w/cane, elbow and wrist AROM    Complete HEP at NV Pendulums 4 ways 1 min x2 ea dir    Shrugs x30  Retractions x30    Supine cane AAROM flexion x20, ER x20 Reviewed and performed this date      DC to HEP NV     Progress to TE below beginning 19        Pulleys   10" x10 Flex/Scap 10" x10   Flex /scap    Finger ladder   NV 5" x10 flex/scap    Cane AAROM flexion and ER   NV 10" x10 ea    Wall slides   ITY x10 ea ITY 5"x10 ea    Sidelying AROM flexion        Sidelying AROM IR/ER        Isometric shoulder flex/abd/ext/IR/ER   5" x10 ea Flex/ABD/ IR/ER 5" x10 ea                                                                                                Modalities      CP post tx  x10 mins x10 min x10 min

## 2019-12-19 ENCOUNTER — TRANSCRIBE ORDERS (OUTPATIENT)
Dept: ADMINISTRATIVE | Facility: HOSPITAL | Age: 49
End: 2019-12-19

## 2019-12-19 ENCOUNTER — OFFICE VISIT (OUTPATIENT)
Dept: PHYSICAL THERAPY | Facility: CLINIC | Age: 49
End: 2019-12-19
Payer: COMMERCIAL

## 2019-12-19 DIAGNOSIS — Z98.890 S/P SHOULDER SURGERY: Primary | ICD-10-CM

## 2019-12-19 DIAGNOSIS — S76.211A GROIN STRAIN, RIGHT, INITIAL ENCOUNTER: Primary | ICD-10-CM

## 2019-12-19 DIAGNOSIS — S76.211A STRAIN OF GROIN, RIGHT, INITIAL ENCOUNTER: Primary | ICD-10-CM

## 2019-12-19 PROCEDURE — 97140 MANUAL THERAPY 1/> REGIONS: CPT

## 2019-12-19 PROCEDURE — 97110 THERAPEUTIC EXERCISES: CPT

## 2019-12-19 NOTE — PROGRESS NOTES
Daily Note     Today's date: 2019  Patient name: Marlin Francisco  : 1970  MRN: [id number]  Referring provider: Iva Hanson MD  Dx:   Encounter Diagnosis     ICD-10-CM    1  S/P shoulder surgery Z98 890                   Subjective: Pt reports minor R shoulder discomfort   Pt reports he is trying to use his R arm as much as he can at home during ADLs  Objective: See treatment diary below      Assessment: Tolerated treatment well  Initiated TB exercises to strengthen BUE  Pain free PROM of the R shoulder continues to slightly increase  Patient demonstrated fatigue post treatment and would benefit from continued PT  No adverse affect to CP post Tx this date  Plan: Continue per plan of care  Precautions: s/p R shoulder surgery (DOS 19) - follow MD protocol  Re-cert due 03  Manual     R shoulder ROM to marquis x10 mins x15 mins x15 min x10 min x15 min                                       Exercise Diary     HEP instruction Pendulums, shrugs, retractions, AAROM flexion and ER w/cane, elbow and wrist AROM    Complete HEP at NV Pendulums 4 ways 1 min x2 ea dir    Shrugs x30  Retractions x30    Supine cane AAROM flexion x20, ER x20 Reviewed and performed this date      DC to HEP NV     Progress to TE below beginning 19        Pulleys   10" x10 Flex/Scap 10" x10   Flex /scap 10" x10   Flex /scap   Finger ladder   NV 5" x10 flex/scap 5" x10 flex/scap   Cane AAROM flexion and ER   NV 10" x10 ea 10" x10 ea Standing    Wall slides   ITY x10 ea ITY 5"x10 ea ITY 5"x10 ea   Sidelying AROM flexion        Sidelying AROM IR/ER        Isometric shoulder flex/abd/ext/IR/ER   5" x10 ea Flex/ABD/ IR/ER 5" x10 ea 5" x10 ea Flex/ABD/ IR/ER/Ext   MTP w/ TB     Karla TB 5" 2x10   LTP w/ TB     Red TB 5" 2x10                                                                               Modalities     CP post tx  x10 mins x10 min x10 min x10 min post Tx

## 2019-12-20 DIAGNOSIS — E78.00 PURE HYPERCHOLESTEROLEMIA: ICD-10-CM

## 2019-12-20 RX ORDER — EZETIMIBE 10 MG/1
10 TABLET ORAL DAILY
Qty: 90 TABLET | Refills: 0 | Status: SHIPPED | OUTPATIENT
Start: 2019-12-20 | End: 2020-03-24 | Stop reason: SDUPTHER

## 2019-12-23 DIAGNOSIS — G25.81 RLS (RESTLESS LEGS SYNDROME): Primary | ICD-10-CM

## 2019-12-23 RX ORDER — ROPINIROLE 1 MG/1
1 TABLET, FILM COATED ORAL
Qty: 30 TABLET | Refills: 2 | Status: SHIPPED | OUTPATIENT
Start: 2019-12-23 | End: 2020-03-06 | Stop reason: SDUPTHER

## 2019-12-24 ENCOUNTER — HOSPITAL ENCOUNTER (OUTPATIENT)
Dept: MRI IMAGING | Facility: HOSPITAL | Age: 49
Discharge: HOME | End: 2019-12-24
Payer: COMMERCIAL

## 2019-12-24 ENCOUNTER — HOSPITAL ENCOUNTER (OUTPATIENT)
Dept: RADIOLOGY | Facility: HOSPITAL | Age: 49
Discharge: HOME | End: 2019-12-24
Admitting: RADIOLOGY
Payer: COMMERCIAL

## 2019-12-24 ENCOUNTER — OFFICE VISIT (OUTPATIENT)
Dept: PHYSICAL THERAPY | Facility: CLINIC | Age: 49
End: 2019-12-24
Payer: COMMERCIAL

## 2019-12-24 DIAGNOSIS — S76.211A STRAIN OF GROIN, RIGHT, INITIAL ENCOUNTER: ICD-10-CM

## 2019-12-24 DIAGNOSIS — S76.211A GROIN STRAIN, RIGHT, INITIAL ENCOUNTER: ICD-10-CM

## 2019-12-24 DIAGNOSIS — Z98.890 S/P SHOULDER SURGERY: Primary | ICD-10-CM

## 2019-12-24 PROCEDURE — 27093 INJECTION FOR HIP X-RAY: CPT

## 2019-12-24 PROCEDURE — 97110 THERAPEUTIC EXERCISES: CPT

## 2019-12-24 PROCEDURE — 73722 MRI JOINT OF LWR EXTR W/DYE: CPT

## 2019-12-24 PROCEDURE — 97140 MANUAL THERAPY 1/> REGIONS: CPT

## 2019-12-24 PROCEDURE — A9585 GADOBUTROL INJECTION: HCPCS | Performed by: PHYSICIAN ASSISTANT

## 2019-12-24 PROCEDURE — 77002 NEEDLE LOCALIZATION BY XRAY: CPT

## 2019-12-24 RX ORDER — LIDOCAINE HYDROCHLORIDE 10 MG/ML
7 INJECTION, SOLUTION EPIDURAL; INFILTRATION; INTRACAUDAL; PERINEURAL
Status: COMPLETED | OUTPATIENT
Start: 2019-12-24 | End: 2019-12-24

## 2019-12-24 RX ORDER — SODIUM CHLORIDE 9 MG/ML
11 INJECTION INTRAVENOUS
Status: COMPLETED | OUTPATIENT
Start: 2019-12-24 | End: 2019-12-24

## 2019-12-24 RX ADMIN — LIDOCAINE HYDROCHLORIDE 7 ML: 10 INJECTION, SOLUTION EPIDURAL; INFILTRATION; INTRACAUDAL; PERINEURAL at 11:18

## 2019-12-24 RX ADMIN — SODIUM CHLORIDE 11 ML: 9 INJECTION, SOLUTION INTRAMUSCULAR; INTRAVENOUS; SUBCUTANEOUS at 11:18

## 2019-12-24 RX ADMIN — IOHEXOL 3 ML: 300 INJECTION, SOLUTION INTRAVENOUS at 11:17

## 2019-12-24 RX ADMIN — GADOBUTROL 1 ML: 604.72 INJECTION INTRAVENOUS at 11:17

## 2019-12-24 NOTE — PROGRESS NOTES
Daily Note     Today's date: 2019  Patient name: Viky Horton  : 1970  MRN: [id number]  Referring provider: Nile Hernandez MD  Dx:   Encounter Diagnosis     ICD-10-CM    1  S/P shoulder surgery Z98 890                   Subjective: "I have a lot of clicking in my R shoulder  That seems to bother me more than anything else " "I have an MRI on my hip/groin area after my therapy today "      Objective: See treatment diary below      Assessment: Tolerated treatment well  Initiated multiple exercises tat focus on strengthen and stability if the R shoulder  No exacerbation of symptoms this date  Patient demonstrated fatigue post treatment and would benefit from continued PT  No adverse affect to CP this date  Plan: Continue per plan of care  Precautions: s/p R shoulder surgery (DOS 19) - follow MD protocol  Re-cert due 94  Manual     R shoulder ROM to marquis x15 mins x15 mins x15 min x10 min x15 min                                       Exercise Diary     HEP instruction  Pendulums 4 ways 1 min x2 ea dir    Shrugs x30  Retractions x30    Supine cane AAROM flexion x20, ER x20 Reviewed and performed this date      DC to HEP NV     Progress to TE below beginning 19        Pulleys 10" x10   Flex /scap  10" x10 Flex/Scap 10" x10   Flex /scap 10" x10   Flex /scap   Finger ladder 5" x10 flex/scap  NV 5" x10 flex/scap 5" x10 flex/scap   Cane AAROM flexion and ER 10" x10 ea Standing   NV 10" x10 ea 10" x10 ea Standing    Wall slides ITY 5"x15 ea  ITY x10 ea ITY 5"x10 ea ITY 5"x10 ea   Sidelying AROM flexion        Sidelying AROM IR/ER        Isometric shoulder flex/abd/ext/IR/ER 5" x10 ea Flex/ABD/ IR/ER/Ext  5" x10 ea Flex/ABD/ IR/ER 5" x10 ea 5" x10 ea Flex/ABD/ IR/ER/Ext   MTP w/ TB Karla TB 5" 2x10    Karla TB 5" 2x10   LTP w/ TB Karla TB 5" 2x10    Red TB 5" 2x10   Standing flex to 90* 2# 2x10       Standing Scap to 90* 2# 2x10 ABC's 1# x1       Supine flex  1# x20       Supine Hrz ABD  1# x20       Prone Flex NV       Prone HRZ ABD NV       Prone Ext NV                   Modalities  12/24 12/9 12/12 12/17 12/19   CP post tx x10 min  x10 mins x10 min x10 min x10 min post Tx

## 2019-12-26 ENCOUNTER — OFFICE VISIT (OUTPATIENT)
Dept: PHYSICAL THERAPY | Facility: CLINIC | Age: 49
End: 2019-12-26
Payer: COMMERCIAL

## 2019-12-26 DIAGNOSIS — Z98.890 S/P SHOULDER SURGERY: Primary | ICD-10-CM

## 2019-12-26 PROCEDURE — 97140 MANUAL THERAPY 1/> REGIONS: CPT | Performed by: PHYSICAL THERAPIST

## 2019-12-26 PROCEDURE — 97110 THERAPEUTIC EXERCISES: CPT | Performed by: PHYSICAL THERAPIST

## 2019-12-26 NOTE — PROGRESS NOTES
Daily Note     Today's date: 2019  Patient name: Baltazar Stewart  : 1970  MRN: [id number]  Referring provider: Connie Gustafson MD  Dx:   Encounter Diagnosis     ICD-10-CM    1  S/P shoulder surgery Z98 890                   Subjective: " My shoulder is feeling good "       Objective: See treatment diary below    Assessment: Tolerated treatment well  Patient exhibited good technique with therapeutic exercises and would benefit from continued PT  Progressed Multiple exercises this session  To help facilitate right shoulder and scapular strengthening  Good tolerance to newly added TE  Plan: Continue per plan of care  Precautions: s/p R shoulder surgery (DOS 19) - follow MD protocol  Re-cert due 01  Manual     R shoulder ROM to marquis x15 mins x15 mins x15 min x10 min x15 min                                       Exercise Diary     HEP instruction   Reviewed and performed this date      DC to HEP NV     Progress to TE below beginning 19        Pulleys 10" x10   Flex /scap 10''x10 10" x10 Flex/Scap 10" x10   Flex /scap 10" x10   Flex /scap   Finger ladder 5" x10 flex/scap 5''x10 Flexion/Scaption  NV 5" x10 flex/scap 5" x10 flex/scap   Cane AAROM flexion and ER 10" x10 ea Standing  10''t25gyrd Standing  NV 10" x10 ea 10" x10 ea Standing    Wall slides ITY 5"x15 ea ITY 5"x10 ea ITY x10 ea ITY 5"x10 ea ITY 5"x10 ea   Sidelying AROM flexion        Sidelying AROM IR/ER        Isometric shoulder flex/abd/ext/IR/ER 5" x10 ea Flex/ABD/ IR/ER/Ext  5" x10 ea Flex/ABD/ IR/ER 5" x10 ea 5" x10 ea Flex/ABD/ IR/ER/Ext   MTP w/ TB Karla TB 5" 2x10 Green 5''20x   Karla TB 5" 2x10   LTP w/ TB Karla TB 5" 2x10 Green 5''20x    Red TB 5" 2x10   Standing flex to 90* 2# 2x10 2# 2x10       Standing Scap to 90* 2# 2x10 2# 2x10       ABC's 1# x1 1# 2x10       Supine flex  1# x20 1# 20x       Supine Hrz ABD  1# x20 1# 20x      Prone Flex NV 2x10 Prone HRZ ABD NV 2x10       Prone Ext NV 2x10                   Modalities  12/24 12/26 12/12 12/17 12/19   CP post tx x10 min  x10 mins x10 min x10 min x10 min post Tx

## 2019-12-31 ENCOUNTER — OFFICE VISIT (OUTPATIENT)
Dept: PHYSICAL THERAPY | Facility: CLINIC | Age: 49
End: 2019-12-31
Payer: COMMERCIAL

## 2019-12-31 DIAGNOSIS — Z98.890 S/P SHOULDER SURGERY: Primary | ICD-10-CM

## 2019-12-31 PROCEDURE — 97140 MANUAL THERAPY 1/> REGIONS: CPT

## 2019-12-31 PROCEDURE — 97110 THERAPEUTIC EXERCISES: CPT

## 2019-12-31 NOTE — PROGRESS NOTES
Daily Note     Today's date: 2019  Patient name: Vito Aguirre  : 1970  MRN: [id number]  Referring provider: Bony Vargas MD  Dx:   Encounter Diagnosis     ICD-10-CM    1  S/P shoulder surgery Z98 890                   Subjective: Pt denies R shoulder pain at rest  "I will be going back to work and the gym on Thursday,  "      Objective: See treatment diary below      Assessment: Tolerated treatment well  Initiated the UBE this and increased weight to strengthen and increase endurance of the R shoulder  Patient demonstrated fatigue post treatment and exhibited good technique with therapeutic exercises  No adverse affect to CP this date  Plan: Progress note during next visit  Potential discharge next visit       Precautions: s/p R shoulder surgery (DOS 19) - follow MD protocol  Re-cert due 57  Manual     R shoulder ROM to marquis x15 mins x15 mins x15 min x10 min x15 min                                       Exercise Diary     HEP instruction        Progress to TE below beginning 19        Pulleys 10" x10   Flex /scap 10''x10 10" x10 Flex/Scap 10" x10   Flex /scap 10" x10   Flex /scap   Finger ladder 5" x10 flex/scap 5''x10 Flexion/Scaption  -- 5" x10 flex/scap 5" x10 flex/scap   Cane AAROM flexion and ER 10" x10 ea Standing  10''x10 each Standing  -- 10" x10 ea 10" x10 ea Standing    Wall slides ITY 5"x15 ea ITY 5"x10 ea ITY 5" x10 ea ITY 5"x10 ea ITY 5"x10 ea   Sidelying AROM flexion        Sidelying AROM IR/ER        Isometric shoulder flex/abd/ext/IR/ER 5" x10 ea Flex/ABD/ IR/ER/Ext  TB IR/ER Red TB x20 5" x10 ea 5" x10 ea Flex/ABD/ IR/ER/Ext   MTP w/ TB Karla TB 5" 2x10 Green 5''20x Green 5'' 20x  Karla TB 5" 2x10   LTP w/ TB Karla TB 5" 2x10 Green 5''20x  Green 5'' 20x  Red TB 5" 2x10   Standing flex to 90* 2# 2x10 2# 2x10  3# 2x10     Standing Scap to 90* 2# 2x10 2# 2x10  3# 2x10     ABC's 1# x1 1# 2x10  2# x1 Supine flex  1# x20 1# 20x  2# x20     Supine Hrz ABD  1# x20 1# 20x 2# x20     Prone Flex NV 2x10  2# 2x10     Prone HRZ ABD NV 2x10  2# 2x10     Prone Ext NV 2x10  2# 2x10     UBE   3' fwd/3' retro 120 RPM         Modalities  12/24 12/26 12/31 12/17 12/19   CP post tx x10 min  x10 mins x10 min x10 min x10 min post Tx

## 2020-01-07 ENCOUNTER — EVALUATION (OUTPATIENT)
Dept: PHYSICAL THERAPY | Facility: CLINIC | Age: 50
End: 2020-01-07
Payer: COMMERCIAL

## 2020-01-07 ENCOUNTER — OFFICE VISIT (OUTPATIENT)
Dept: PHYSICAL THERAPY | Facility: CLINIC | Age: 50
End: 2020-01-07
Payer: COMMERCIAL

## 2020-01-07 VITALS — DIASTOLIC BLOOD PRESSURE: 89 MMHG | SYSTOLIC BLOOD PRESSURE: 139 MMHG

## 2020-01-07 DIAGNOSIS — R10.30 DEEP GROIN PAIN: ICD-10-CM

## 2020-01-07 DIAGNOSIS — Z98.890 S/P SHOULDER SURGERY: Primary | ICD-10-CM

## 2020-01-07 DIAGNOSIS — R10.84 GENERALIZED ABDOMINAL PAIN: Primary | ICD-10-CM

## 2020-01-07 PROCEDURE — 97110 THERAPEUTIC EXERCISES: CPT | Performed by: PHYSICAL THERAPIST

## 2020-01-07 PROCEDURE — 97161 PT EVAL LOW COMPLEX 20 MIN: CPT | Performed by: PHYSICAL THERAPIST

## 2020-01-07 PROCEDURE — 97535 SELF CARE MNGMENT TRAINING: CPT | Performed by: PHYSICAL THERAPIST

## 2020-01-07 NOTE — LETTER
2020    MD Mann Quan [address] [facility]    Patient: Vito Aguirre   YOB: 1970   Date of Visit: 2020     Encounter Diagnosis     ICD-10-CM    1  S/P shoulder surgery Z98 890        Dear Dr Colletta Sessions:    Thank you for your recent referral of Vito Aguirre  Please review the attached evaluation summary from Merrick's recent visit  Please verify that you agree with the plan of care by signing the attached order  If you have any questions or concerns, please do not hesitate to call  I sincerely appreciate the opportunity to share in the care of one of your patients and hope to have another opportunity to work with you in the near future  Sincerely,    Kunal Alfred, PT      Referring Provider:      I certify that I have read the below Plan of Care and certify the need for these services furnished under this plan of treatment while under my care  Jim Watson, MD [facility]: [phone]          PT Progress Note     Today's date: 2020  Patient name: Vito Aguirre   : 1970  MRN: [id number]  Referring provider: Bony Vargas MD  Dx:   Encounter Diagnosis     ICD-10-CM    1  S/P shoulder surgery Z98 890                 Assessment  Vito Aguirre has demonstrated decreased pain, increased strength, increased range of motion, and increased activity tolerance since starting physical therapy services for his right shoulder  He reports an overall improvement of 80% thus far  He continues to present with pain, limited GHJ IR ROM, decreased strength and decreased activity tolerance and would benefit from additional skilled physical therapy interventions to address impairments and maximize function   Patient demonstrates independence with all strengthening and flexibility exercises at this time and demonstrates fair understanding of self progression  Patient has been instructed to avoid any exercise that exacerbates his pain  Impairments:  activity intolerance, impaired physical strength and pain with function  Understanding of Dx/Px/POC: good   Prognosis: good    Goals  STGs  1) In 4 weeks patient will demonstrate PROM R shoulder WNL - MET (with exception of IR)  2) In 4 weeks patient will report pain levels <3/10 - partially met (pain is reported in a m , pt reports "on average not bad during the day")  3) In 4 weeks patient will report able to sleep through the night without waking from pain - not met    LTGs  1) In 6-8 weeks patient will demonstrate full AROM R shoulder pain free - MET (with exception of functional IR)  2) In 6-8 weeks patient will report no difficulty with ADLs and IADLs, pain free - MET  3) In 6-8 weeks patient will demonstrate independence with HEP and readiness to DC to gym/self maintenance program   - MET    Plan  Patient reports limited time available to continue PT appts due to being back to work  He also wishes to do his HEP independently vs scheduling further appts at this time due to high co-pay for each PT visit  I will provide patient with updated written HEP and follow up within the next 4 weeks as needed  Patient to call if having any problems with his shoulder or HEP       Patient would benefit from: skilled physical therapy  Referral necessary: No  Planned modality interventions: cryotherapy, TENS and unattended electrical stimulation  Planned therapy interventions: manual therapy, joint mobilization, massage, Solano taping, activity modification, body mechanics training, neuromuscular re-education, patient education, postural training, self care, strengthening, stretching, flexibility, functional ROM exercises, home exercise program and therapeutic exercise  Frequency: prn  Duration in weeks: 4  Plan of Care beginning date: 1/7/2020  Plan of Care expiration date: 2/4/2020  Treatment plan discussed with: patient and family        Subjective Evaluation    History of Present Illness  Mechanism of injury: Patient c/o R shoulder pain for several months  Reports having a short bout of conservative PT but with no improvement  Underwent surgery on R shoulder yesterday, "Rotation Medical Patch" procedure for rotator cuff tear  Denies any complications with surgery  Using Oxycodone as prescribed for pain control  Patient sleeping in recliner with sling  RTD on 19 for suture removal     Progress Note 2020:  Patient reports he feels 80% improved regarding his R shoulder  He has recently RTW without issues  Returned to running without issue  Certain movements when sleeping and certain lifting movements can "bother" the shoulder  Has some difficulty tucking his shirt in in the back  He was noticing some uncomfortable "cracking" and "discomfort" in the shoulder with "I/T/Y" stretches that he was trying against a wall  Does not have f/u with surgeon until 20  Patient has been going to gym and doing "light dumb bells, light bench press, lat pull downs, resisted IR/ER with bands"  Pain  IE    2020:  Current pain rating: 3  0  At best pain ratin  0  At worst pain rating: 3  8 (first thing in a m  after prolonged rested position)    Social Support  Lives with: spouse    Employment status: working  Hand dominance: right  Exercise history: Prior to injury patient was active with weight lifting and running    Treatments  Previous treatment: physical therapy  Current treatment: medication and physical therapy  Patient Goals  Patient goals for therapy: decreased pain, increased motion and increased strength          Objective     Observations at Initial Evaluation (IE):    Right Shoulder  Positive for incision  Additional Observation Details  Dressings removed  4 portal sites with sutures intact  Light bleeding over 2 anterior sites  Covered all sites with sterile bandages   Educated patient on s/s of infection and to contact MD if suspect infection  Educated on daily check of sites and daily change of bandages  Passive Range of Motion  IE       1/7/2020:  Right Shoulder   Flexion: 135 degrees    155 deg  Abduction: 75 degrees with pain  145 deg  External rotation 45°: 0 degrees with pain  80 deg (@90 deg abd)  Internal rotation 0°: hand to belly   with pain 40 deg (@ 90 deg abd) w/pain    Active Range of Motion  IE     1/7/2020:  NT at IT    Flexion     155 deg w/pain at end range  Abduction    160 deg w/pain at end range  Extension    50 deg pain free  Functional IR (thumb to spine) L1 w/pain  Functional ER (third digit to spine) T3 pain free      Strength/Myotome Testing   NT at IE     1/7/2020:  Flexion      4/5 w/pain  Abduction     4+/5  Extension     4+/5  IR      4+/5  ER      4/5 w/pain    Additional Strength Details  Not appropriate for MMT at time of initial evaluation         Precautions: s/p R shoulder surgery (DOS 12/4/19) - follow MD protocol  Re-cert due 1/2/5575  Manual  12/24 12/26 12/31 1/7 12/19   R shoulder ROM to marquis x15 mins x15 mins x15 min Resume IR PROM and posterior capsule stretch x15 min                                       Exercise Diary  12/24 12/26 12/31 1/7 12/19   HEP instruction    Updated written HEP provided to patient    Progress to TE below beginning 12/11/19        Pulleys 10" x10   Flex /scap 10''x10 10" x10 Flex/Scap  10" x10   Flex /scap   Finger ladder 5" x10 flex/scap 5''x10 Flexion/Scaption  --  5" x10 flex/scap   Cane AAROM flexion and ER 10" x10 ea Standing  10''x10 each Standing  --  10" x10 ea Standing    Wall slides ITY 5"x15 ea ITY 5"x10 ea ITY 5" x10 ea  ITY 5"x10 ea   Sidelying AROM flexion        Sidelying AROM IR/ER        Isometric shoulder flex/abd/ext/IR/ER 5" x10 ea Flex/ABD/ IR/ER/Ext  TB IR/ER Red TB x20  5" x10 ea Flex/ABD/ IR/ER/Ext   MTP w/ TB Karla TB 5" 2x10 Green 5''20x Green 5'' 20x  Karla TB 5" 2x10   LTP w/ TB Karla TB 5" 2x10 Green 5''20x Green 5'' 20x  Red TB 5" 2x10   Standing flex to 90* 2# 2x10 2# 2x10  3# 2x10     Standing Scap to 90* 2# 2x10 2# 2x10  3# 2x10     ABC's 1# x1 1# 2x10  2# x1     Supine flex  1# x20 1# 20x  2# x20     Supine Hrz ABD  1# x20 1# 20x 2# x20     Prone Flex NV 2x10  2# 2x10     Prone HRZ ABD NV 2x10  2# 2x10     Prone Ext NV 2x10  2# 2x10     Sleeper stretch    sidelying  10"x10    Towel IR stretch    10"x10            UBE   3' fwd/3' retro 120 RPM         Modalities  12/24 12/26 12/31 1/7 12/19   CP post tx x10 min  x10 mins x10 min  x10 min post Tx                       Patient was provided a home exercise program and demonstrated an understanding of exercises  Patient was advised to stop performing home exercise program if symptoms increase or new complaints developed  Patient was instructed on progression of exercises and progression within protocol  Patient has copy of MD post-op protocol for reference  Verbal understanding demonstrated regarding home exercise program instructions

## 2020-01-07 NOTE — PROGRESS NOTES
PT Evaluation     Today's date: 2020  Patient name: Sandi Conde  : 1970  MRN: [id number]  Referring provider: Rome Mahajan PA-C  Dx:   Encounter Diagnosis     ICD-10-CM    1  Generalized abdominal pain R10 84    2  Deep groin pain R10 30                   Assessment  Assessment details: Sandi Conde is a 48 y o  male who presents to PT via direct access with complaints of lower abdominal pain/deep groin pain  Based upon examination, patient is not appropriate for PT  His symptoms do not appear to be associated with hip or lumbar pathology  Pain reproduced with increase in abdominal pressure (coughing and sitting up from supine) and with running  No palpable tenderness to muscles or tendons  Recommending patient visit with PCP/PA for further diagnostic testing, possible diagnostic ultrasound imaging  Impairments: pain with function  Understanding of Dx/Px/POC: good  Plan  Plan details: No PT treatment at this time  Referral necessary: Yes  Other planned therapy interventions: None  Treatment plan discussed with: patient and PCP        Subjective Evaluation    History of Present Illness  Date of onset: 2019  Mechanism of injury: Patient reports he began having R sided lower abdominal/groin pain in the beginning of September  Pain has since moved toward center and L side of lower abdomen  He is unsure of exactly how/why the pain began, but he thinks he was having some pain in the beginning of September, then participated in a running relay (ran 6 miles) around that time of the month and noticed significant increase in pain then  Patient did not seek any medical attention until November  Had MRI w/contrast of pelvis and R hip with no abnormalities per radiology report  Pain is intermittent, brought on with coughing or when lifting LEs to get into bed or when attempting to run  Sit ups also are painful  Pain does not last  Pain does not wake him up at night   Denies any radiating pain into LEs  Denies N/T  Does not take any medication for the pain  Pain  Current pain ratin  At best pain ratin  At worst pain ratin  Quality: tight ("soreness" )    Exercise history: active runner, works out at gym      Diagnostic Tests  MRI studies: normal  Treatments  Previous treatment: medication  Patient Goals  Patient goals for therapy: return to sport/leisure activities and decreased pain          Objective     Concurrent Complaints  Negative for night pain, disturbed sleep, bladder dysfunction, bowel dysfunction and saddle (S4) numbness    Palpation   Left   No palpable tenderness to the iliopsoas, piriformis and rectus femoris  Right   No palpable tenderness to the iliopsoas, piriformis and rectus femoris  Tenderness     Left Hip   No tenderness in the ASIS, PSIS, greater trochanter, iliac crest, pubic tubercle and sacroiliac joint  Right Hip   No tenderness in the ASIS, PSIS, greater trochanter, iliac crest, pubic tubercle and sacroiliac joint       Active Range of Motion     Lumbar   Flexion:  Restriction level: minimal  Extension:  Restriction level: minimal  Left lateral flexion:  Restriction level: minimal  Right lateral flexion:  Restriction level: minimal  Left rotation:  Restriction level: minimal  Right rotation:  Restriction level: minimal    Additional Active Range of Motion Details  Pain free with all AROM lumbar spine    Passive Range of Motion     Additional Passive Range of Motion Details  Prone and Prone on elbows does not affect pain    Strength/Myotome Testing     Left Hip   Planes of Motion   Flexion: 5  Extension: 5  Abduction: 5  Adduction: 5    Right Hip   Planes of Motion   Flexion: 5  Extension: 5  Abduction: 5  Adduction: 5    Left Knee   Flexion: 5  Extension: 5    Right Knee   Flexion: 5  Extension: 5    Left Ankle/Foot   Dorsiflexion: 5  Plantar flexion: 5    Right Ankle/Foot   Dorsiflexion: 5  Plantar flexion: 5    Additional Strength Details  Pain reproduced with resisted L hip flexion    Tests     Lumbar   Negative SIJ compression, sacroiliac distraction and Gaenslen's   Left   Negative crossed SLR, passive SLR and slump test      Right   Negative crossed SLR, passive SLR and slump test      Left Pelvic Girdle/Sacrum   Negative: active SLR test      Right Pelvic Girdle/Sacrum   Negative: active SLR test      Left Hip   Negative Ely's, JENNA, Gaenslen's and piriformis  Terence: Negative  Modified Terence: Negative  90/90 SLR: Negative  SLR: Negative  Right Hip   Negative Ely's, JENNA, Gaenslen's and piriformis  Terence: Negative  Modified Terence: Negative  90/90 SLR: Negative  SLR: Negative       Additional Tests Details  Pain reproduced with active SLR L, anteriorly (no LBP)

## 2020-01-07 NOTE — LETTER
2020    MD Mann [address] [facility]    Patient: Hemanth Blanco   YOB: 1970   Date of Visit: 2020     Encounter Diagnosis     ICD-10-CM    1  S/P shoulder surgery Z98 890        Dear Dr Mohan Risk:    Thank you for your recent referral of Hemanth Fillers  Please review the attached evaluation summary from Merrick's recent visit  Please verify that you agree with the plan of care by signing the attached order  If you have any questions or concerns, please do not hesitate to call  I sincerely appreciate the opportunity to share in the care of one of your patients and hope to have another opportunity to work with you in the near future  Sincerely,    Cynthia Nichols, PT      Referring Provider:      I certify that I have read the below Plan of Care and certify the need for these services furnished under this plan of treatment while under my care  MD Mann [facility] [address] [facility] [phone]          PT Progress Note     Today's date: 2020  Patient name: Hemanth Blanco   : 1970  MRN: [id number]  Referring provider: Brandon Sinha MD  Dx:   Encounter Diagnosis     ICD-10-CM    1  S/P shoulder surgery Z98 890                 Assessment  Hemanth Blanco has demonstrated decreased pain, increased strength, increased range of motion, and increased activity tolerance since starting physical therapy services for his right shoulder  He reports an overall improvement of 80% thus far  He continues to present with pain, limited GHJ IR ROM, decreased strength and decreased activity tolerance and would benefit from additional skilled physical therapy interventions to address impairments and maximize function   Patient demonstrates independence with all strengthening and flexibility exercises at this time and demonstrates fair understanding of self progression  Patient has been instructed to avoid any exercise that exacerbates his pain  Impairments:  activity intolerance, impaired physical strength and pain with function  Understanding of Dx/Px/POC: good   Prognosis: good    Goals  STGs  1) In 4 weeks patient will demonstrate PROM R shoulder WNL - MET (with exception of IR)  2) In 4 weeks patient will report pain levels <3/10 - partially met (pain is reported in a m , pt reports "on average not bad during the day")  3) In 4 weeks patient will report able to sleep through the night without waking from pain - not met    LTGs  1) In 6-8 weeks patient will demonstrate full AROM R shoulder pain free - MET (with exception of functional IR)  2) In 6-8 weeks patient will report no difficulty with ADLs and IADLs, pain free - MET  3) In 6-8 weeks patient will demonstrate independence with HEP and readiness to DC to gym/self maintenance program   - MET    Plan  Patient reports limited time available to continue PT appts due to being back to work  He also wishes to do his HEP independently vs scheduling further appts at this time due to high co-pay for each PT visit  I will provide patient with updated written HEP and follow up within the next 4 weeks as needed  Patient to call if having any problems with his shoulder or HEP       Patient would benefit from: skilled physical therapy  Referral necessary: No  Planned modality interventions: cryotherapy, TENS and unattended electrical stimulation  Planned therapy interventions: manual therapy, joint mobilization, massage, Solano taping, activity modification, body mechanics training, neuromuscular re-education, patient education, postural training, self care, strengthening, stretching, flexibility, functional ROM exercises, home exercise program and therapeutic exercise  Frequency: prn  Duration in weeks: 4  Plan of Care beginning date: 1/7/2020  Plan of Care expiration date: 2/4/2020  Treatment plan discussed with: patient and family        Subjective Evaluation    History of Present Illness  Mechanism of injury: Patient c/o R shoulder pain for several months  Reports having a short bout of conservative PT but with no improvement  Underwent surgery on R shoulder yesterday, "Rotation Medical Patch" procedure for rotator cuff tear  Denies any complications with surgery  Using Oxycodone as prescribed for pain control  Patient sleeping in recliner with sling  RTD on 19 for suture removal     Progress Note 2020:  Patient reports he feels 80% improved regarding his R shoulder  He has recently RTW without issues  Returned to running without issue  Certain movements when sleeping and certain lifting movements can "bother" the shoulder  Has some difficulty tucking his shirt in in the back  He was noticing some uncomfortable "cracking" and "discomfort" in the shoulder with "I/T/Y" stretches that he was trying against a wall  Does not have f/u with surgeon until 20  Patient has been going to gym and doing "light dumb bells, light bench press, lat pull downs, resisted IR/ER with bands"  Pain  IE    2020:  Current pain rating: 3  0  At best pain ratin  0  At worst pain rating: 3  8 (first thing in a m  after prolonged rested position)    Social Support  Lives with: spouse    Employment status: working  Hand dominance: right  Exercise history: Prior to injury patient was active with weight lifting and running    Treatments  Previous treatment: physical therapy  Current treatment: medication and physical therapy  Patient Goals  Patient goals for therapy: decreased pain, increased motion and increased strength          Objective     Observations at Initial Evaluation (IE):    Right Shoulder  Positive for incision  Additional Observation Details  Dressings removed  4 portal sites with sutures intact  Light bleeding over 2 anterior sites  Covered all sites with sterile bandages   Educated patient on s/s of infection and to contact MD if suspect infection  Educated on daily check of sites and daily change of bandages  Passive Range of Motion  IE       1/7/2020:  Right Shoulder   Flexion: 135 degrees    155 deg  Abduction: 75 degrees with pain  145 deg  External rotation 45°: 0 degrees with pain  80 deg (@90 deg abd)  Internal rotation 0°: hand to belly   with pain 40 deg (@ 90 deg abd) w/pain    Active Range of Motion  IE     1/7/2020:  NT at IT    Flexion     155 deg w/pain at end range  Abduction    160 deg w/pain at end range  Extension    50 deg pain free  Functional IR (thumb to spine) L1 w/pain  Functional ER (third digit to spine) T3 pain free      Strength/Myotome Testing   NT at IE     1/7/2020:  Flexion      4/5 w/pain  Abduction     4+/5  Extension     4+/5  IR      4+/5  ER      4/5 w/pain    Additional Strength Details  Not appropriate for MMT at time of initial evaluation         Precautions: s/p R shoulder surgery (DOS 12/4/19) - follow MD protocol  Re-cert due 2/8/1098  Manual  12/24 12/26 12/31 1/7 12/19   R shoulder ROM to marquis x15 mins x15 mins x15 min Resume IR PROM and posterior capsule stretch x15 min                                       Exercise Diary  12/24 12/26 12/31 1/7 12/19   HEP instruction    Updated written HEP provided to patient    Progress to TE below beginning 12/11/19        Pulleys 10" x10   Flex /scap 10''x10 10" x10 Flex/Scap  10" x10   Flex /scap   Finger ladder 5" x10 flex/scap 5''x10 Flexion/Scaption  --  5" x10 flex/scap   Cane AAROM flexion and ER 10" x10 ea Standing  10''x10 each Standing  --  10" x10 ea Standing    Wall slides ITY 5"x15 ea ITY 5"x10 ea ITY 5" x10 ea  ITY 5"x10 ea   Sidelying AROM flexion        Sidelying AROM IR/ER        Isometric shoulder flex/abd/ext/IR/ER 5" x10 ea Flex/ABD/ IR/ER/Ext  TB IR/ER Red TB x20  5" x10 ea Flex/ABD/ IR/ER/Ext   MTP w/ TB Karla TB 5" 2x10 Green 5''20x Green 5'' 20x  Karla TB 5" 2x10   LTP w/ TB Karla TB 5" 2x10 Green 5''20x Green 5'' 20x  Red TB 5" 2x10   Standing flex to 90* 2# 2x10 2# 2x10  3# 2x10     Standing Scap to 90* 2# 2x10 2# 2x10  3# 2x10     ABC's 1# x1 1# 2x10  2# x1     Supine flex  1# x20 1# 20x  2# x20     Supine Hrz ABD  1# x20 1# 20x 2# x20     Prone Flex NV 2x10  2# 2x10     Prone HRZ ABD NV 2x10  2# 2x10     Prone Ext NV 2x10  2# 2x10     Sleeper stretch    sidelying  10"x10    Towel IR stretch    10"x10            UBE   3' fwd/3' retro 120 RPM         Modalities  12/24 12/26 12/31 1/7 12/19   CP post tx x10 min  x10 mins x10 min  x10 min post Tx                       Patient was provided a home exercise program and demonstrated an understanding of exercises  Patient was advised to stop performing home exercise program if symptoms increase or new complaints developed  Patient was instructed on progression of exercises and progression within protocol  Patient has copy of MD post-op protocol for reference  Verbal understanding demonstrated regarding home exercise program instructions

## 2020-01-07 NOTE — LETTER
2020    Brice Bell PA-C  [address]     Patient: Simona Dickey   YOB: 1970   Date of Visit: 2020     Encounter Diagnosis     ICD-10-CM    1  Generalized abdominal pain R10 84    2  Deep groin pain R10 30        Dear Dr Barry Abdi: Thank you for your recent referral of Simona Dickey  Please review the attached evaluation summary from Merrick's recent visit  Please verify that you agree with the plan of care by signing the attached order  If you have any questions or concerns, please do not hesitate to call  I sincerely appreciate the opportunity to share in the care of one of your patients and hope to have another opportunity to work with you in the near future  Sincerely,    Sandeep Jeong, PT      Referring Provider:      I certify that I have read the below Plan of Care and certify the need for these services furnished under this plan of treatment while under my care  Brice Bell PA-C  [address] [address]  [place]          PT Evaluation     Today's date: 2020  Patient name: Simona Dickey  : 1970  MRN: [id number]  Referring provider: Rahel Stratton PA-C  Dx:   Encounter Diagnosis     ICD-10-CM    1  Generalized abdominal pain R10 84    2  Deep groin pain R10 30                   Assessment  Assessment details: Simona Dickey is a 48 y o  male who presents to PT via direct access with complaints of lower abdominal pain/deep groin pain  Based upon examination, patient is not appropriate for PT  His symptoms do not appear to be associated with hip or lumbar pathology  Pain reproduced with increase in abdominal pressure (coughing and sitting up from supine) and with running  No palpable tenderness to muscles or tendons  Recommending patient visit with PCP/PA for further diagnostic testing, possible diagnostic ultrasound imaging     Impairments: pain with function  Understanding of Dx/Px/POC: good  Plan  Plan details: No PT treatment at this time  Referral necessary: Yes  Other planned therapy interventions: None  Treatment plan discussed with: patient and PCP        Subjective Evaluation    History of Present Illness  Date of onset: 2019  Mechanism of injury: Patient reports he began having R sided lower abdominal/groin pain in the beginning of September  Pain has since moved toward center and L side of lower abdomen  He is unsure of exactly how/why the pain began, but he thinks he was having some pain in the beginning of September, then participated in a running relay (ran 6 miles) around that time of the month and noticed significant increase in pain then  Patient did not seek any medical attention until November  Had MRI w/contrast of pelvis and R hip with no abnormalities per radiology report  Pain is intermittent, brought on with coughing or when lifting LEs to get into bed or when attempting to run  Sit ups also are painful  Pain does not last  Pain does not wake him up at night  Denies any radiating pain into LEs  Denies N/T  Does not take any medication for the pain  Pain  Current pain ratin  At best pain ratin  At worst pain ratin  Quality: tight ("soreness" )    Exercise history: active runner, works out at gym      Diagnostic Tests  MRI studies: normal  Treatments  Previous treatment: medication  Patient Goals  Patient goals for therapy: return to sport/leisure activities and decreased pain          Objective     Concurrent Complaints  Negative for night pain, disturbed sleep, bladder dysfunction, bowel dysfunction and saddle (S4) numbness    Palpation   Left   No palpable tenderness to the iliopsoas, piriformis and rectus femoris  Right   No palpable tenderness to the iliopsoas, piriformis and rectus femoris       Tenderness     Left Hip   No tenderness in the ASIS, PSIS, greater trochanter, iliac crest, pubic tubercle and sacroiliac joint  Right Hip   No tenderness in the ASIS, PSIS, greater trochanter, iliac crest, pubic tubercle and sacroiliac joint  Active Range of Motion     Lumbar   Flexion:  Restriction level: minimal  Extension:  Restriction level: minimal  Left lateral flexion:  Restriction level: minimal  Right lateral flexion:  Restriction level: minimal  Left rotation:  Restriction level: minimal  Right rotation:  Restriction level: minimal    Additional Active Range of Motion Details  Pain free with all AROM lumbar spine    Passive Range of Motion     Additional Passive Range of Motion Details  Prone and Prone on elbows does not affect pain    Strength/Myotome Testing     Left Hip   Planes of Motion   Flexion: 5  Extension: 5  Abduction: 5  Adduction: 5    Right Hip   Planes of Motion   Flexion: 5  Extension: 5  Abduction: 5  Adduction: 5    Left Knee   Flexion: 5  Extension: 5    Right Knee   Flexion: 5  Extension: 5    Left Ankle/Foot   Dorsiflexion: 5  Plantar flexion: 5    Right Ankle/Foot   Dorsiflexion: 5  Plantar flexion: 5    Additional Strength Details  Pain reproduced with resisted L hip flexion    Tests     Lumbar   Negative SIJ compression, sacroiliac distraction and Gaenslen's   Left   Negative crossed SLR, passive SLR and slump test      Right   Negative crossed SLR, passive SLR and slump test      Left Pelvic Girdle/Sacrum   Negative: active SLR test      Right Pelvic Girdle/Sacrum   Negative: active SLR test      Left Hip   Negative Ely's, JENNA, Gaenslen's and piriformis  Terence: Negative  Modified Terence: Negative  90/90 SLR: Negative  SLR: Negative  Right Hip   Negative Ely's, JENNA, Gaenslen's and piriformis  Terence: Negative  Modified Terence: Negative  90/90 SLR: Negative  SLR: Negative       Additional Tests Details  Pain reproduced with active SLR L, anteriorly (no LBP)

## 2020-01-07 NOTE — PROGRESS NOTES
PT Progress Note     Today's date: 2020  Patient name: Bart Vasquez   : 1970  MRN: [id number]  Referring provider: Tena Botello MD  Dx:   Encounter Diagnosis     ICD-10-CM    1  S/P shoulder surgery Z98 890                 Assessment  Bart Vasquez has demonstrated decreased pain, increased strength, increased range of motion, and increased activity tolerance since starting physical therapy services for his right shoulder  He reports an overall improvement of 80% thus far  He continues to present with pain, limited GHJ IR ROM, decreased strength and decreased activity tolerance and would benefit from additional skilled physical therapy interventions to address impairments and maximize function  Patient demonstrates independence with all strengthening and flexibility exercises at this time and demonstrates fair understanding of self progression  Patient has been instructed to avoid any exercise that exacerbates his pain  Impairments:  activity intolerance, impaired physical strength and pain with function  Understanding of Dx/Px/POC: good   Prognosis: good    Goals  STGs  1) In 4 weeks patient will demonstrate PROM R shoulder WNL - MET (with exception of IR)  2) In 4 weeks patient will report pain levels <3/10 - partially met (pain is reported in a m , pt reports "on average not bad during the day")  3) In 4 weeks patient will report able to sleep through the night without waking from pain - not met    LTGs  1) In 6-8 weeks patient will demonstrate full AROM R shoulder pain free - MET (with exception of functional IR)  2) In 6-8 weeks patient will report no difficulty with ADLs and IADLs, pain free - MET  3) In 6-8 weeks patient will demonstrate independence with HEP and readiness to DC to gym/self maintenance program   - MET    Plan  Patient reports limited time available to continue PT appts due to being back to work   He also wishes to do his HEP independently vs scheduling further appts at this time due to high co-pay for each PT visit  I will provide patient with updated written HEP and follow up within the next 4 weeks as needed  Patient to call if having any problems with his shoulder or HEP  Patient would benefit from: skilled physical therapy  Referral necessary: No  Planned modality interventions: cryotherapy, TENS and unattended electrical stimulation  Planned therapy interventions: manual therapy, joint mobilization, massage, Solano taping, activity modification, body mechanics training, neuromuscular re-education, patient education, postural training, self care, strengthening, stretching, flexibility, functional ROM exercises, home exercise program and therapeutic exercise  Frequency: prn  Duration in weeks: 4  Plan of Care beginning date: 1/7/2020  Plan of Care expiration date: 2/4/2020  Treatment plan discussed with: patient and family        Subjective Evaluation    History of Present Illness  Mechanism of injury: Patient c/o R shoulder pain for several months  Reports having a short bout of conservative PT but with no improvement  Underwent surgery on R shoulder yesterday, "Rotation Medical Patch" procedure for rotator cuff tear  Denies any complications with surgery  Using Oxycodone as prescribed for pain control  Patient sleeping in recliner with sling  RTD on 12/17/19 for suture removal     Progress Note 1/7/2020:  Patient reports he feels 80% improved regarding his R shoulder  He has recently RTW without issues  Returned to running without issue  Certain movements when sleeping and certain lifting movements can "bother" the shoulder  Has some difficulty tucking his shirt in in the back  He was noticing some uncomfortable "cracking" and "discomfort" in the shoulder with "I/T/Y" stretches that he was trying against a wall  Does not have f/u with surgeon until 1/20/20   Patient has been going to gym and doing "light dumb bells, light bench press, lat pull downs, resisted IR/ER with bands"  Pain  IE    2020:  Current pain rating: 3  0  At best pain ratin  0  At worst pain rating: 3  8 (first thing in a m  after prolonged rested position)    Social Support  Lives with: spouse    Employment status: working  Hand dominance: right  Exercise history: Prior to injury patient was active with weight lifting and running    Treatments  Previous treatment: physical therapy  Current treatment: medication and physical therapy  Patient Goals  Patient goals for therapy: decreased pain, increased motion and increased strength          Objective     Observations at Initial Evaluation (IE):    Right Shoulder  Positive for incision  Additional Observation Details  Dressings removed  4 portal sites with sutures intact  Light bleeding over 2 anterior sites  Covered all sites with sterile bandages  Educated patient on s/s of infection and to contact MD if suspect infection  Educated on daily check of sites and daily change of bandages  Passive Range of Motion  IE       2020:  Right Shoulder   Flexion: 135 degrees    155 deg  Abduction: 75 degrees with pain  145 deg  External rotation 45°: 0 degrees with pain  80 deg (@90 deg abd)  Internal rotation 0°: hand to belly   with pain 40 deg (@ 90 deg abd) w/pain    Active Range of Motion  IE     2020:  NT at IT    Flexion     155 deg w/pain at end range  Abduction    160 deg w/pain at end range  Extension    50 deg pain free  Functional IR (thumb to spine) L1 w/pain  Functional ER (third digit to spine) T3 pain free      Strength/Myotome Testing   NT at IE     2020:  Flexion      4/5 w/pain  Abduction     4+/5  Extension     4+/5  IR      4+/5  ER      4/5 w/pain    Additional Strength Details  Not appropriate for MMT at time of initial evaluation         Precautions: s/p R shoulder surgery (DOS 19) - follow MD protocol  Re-cert due   Manual     R shoulder ROM to marquis x15 mins x15 mins x15 min Resume IR PROM and posterior capsule stretch x15 min                                       Exercise Diary  12/24 12/26 12/31 1/7 12/19   HEP instruction    Updated written HEP provided to patient    Progress to TE below beginning 12/11/19        Pulleys 10" x10   Flex /scap 10''x10 10" x10 Flex/Scap  10" x10   Flex /scap   Finger ladder 5" x10 flex/scap 5''x10 Flexion/Scaption  --  5" x10 flex/scap   Cane AAROM flexion and ER 10" x10 ea Standing  10''x10 each Standing  --  10" x10 ea Standing    Wall slides ITY 5"x15 ea ITY 5"x10 ea ITY 5" x10 ea  ITY 5"x10 ea   Sidelying AROM flexion        Sidelying AROM IR/ER        Isometric shoulder flex/abd/ext/IR/ER 5" x10 ea Flex/ABD/ IR/ER/Ext  TB IR/ER Red TB x20  5" x10 ea Flex/ABD/ IR/ER/Ext   MTP w/ TB Karla TB 5" 2x10 Green 5''20x Green 5'' 20x  Karla TB 5" 2x10   LTP w/ TB Karla TB 5" 2x10 Green 5''20x  Green 5'' 20x  Red TB 5" 2x10   Standing flex to 90* 2# 2x10 2# 2x10  3# 2x10     Standing Scap to 90* 2# 2x10 2# 2x10  3# 2x10     ABC's 1# x1 1# 2x10  2# x1     Supine flex  1# x20 1# 20x  2# x20     Supine Hrz ABD  1# x20 1# 20x 2# x20     Prone Flex NV 2x10  2# 2x10     Prone HRZ ABD NV 2x10  2# 2x10     Prone Ext NV 2x10  2# 2x10     Sleeper stretch    sidelying  10"x10    Towel IR stretch    10"x10            UBE   3' fwd/3' retro 120 RPM         Modalities  12/24 12/26 12/31 1/7 12/19   CP post tx x10 min  x10 mins x10 min  x10 min post Tx                       Patient was provided a home exercise program and demonstrated an understanding of exercises  Patient was advised to stop performing home exercise program if symptoms increase or new complaints developed  Patient was instructed on progression of exercises and progression within protocol  Patient has copy of MD post-op protocol for reference  Verbal understanding demonstrated regarding home exercise program instructions

## 2020-01-08 ENCOUNTER — TRANSCRIBE ORDERS (OUTPATIENT)
Dept: PHYSICAL THERAPY | Facility: CLINIC | Age: 50
End: 2020-01-08

## 2020-01-08 DIAGNOSIS — Z98.890 PERSONAL HISTORY OF SURGERY TO HEART AND GREAT VESSELS, PRESENTING HAZARDS TO HEALTH: Primary | ICD-10-CM

## 2020-01-08 DIAGNOSIS — R10.31 RIGHT GROIN PAIN: Primary | ICD-10-CM

## 2020-01-09 DIAGNOSIS — R10.32 LEFT GROIN PAIN: Primary | ICD-10-CM

## 2020-01-10 ENCOUNTER — TELEPHONE (OUTPATIENT)
Dept: INTERNAL MEDICINE CLINIC | Facility: CLINIC | Age: 50
End: 2020-01-10

## 2020-01-10 NOTE — TELEPHONE ENCOUNTER
I left voice message that test was ordered and he could call central scheduling to set up appt     ----- Message from Tina Garcia sent at 1/10/2020  9:33 AM EST -----      ----- Message -----  From: Chaitanya Mcginnis PA-C  Sent: 1/8/2020   8:00 AM EST  To: Preston Tejada, PT, #    Hi Maira Hill  I will put the order in diagnostic ultrasound  Also, I will have our staff reach out to him to let him know  Thanks  -Elif Oliveira  ----- Message -----  From: Preston Tejada PT  Sent: 1/7/2020   5:18 PM EST  To: PARAMJIT Andrews La Nena Patel has been coming to PT for his rotator cuff repair but he asked if I'd take a look at him for his groin pain  I examined him but from a musculoskeletal standpoint I don't feel like he would benefit from PT  All of the hip testing and lumbar spine testing does not reproduce his pain  It's only with coughing or with strain from transferring from laying to sitting (basically a sit up or crunch)  I know he had MRI of hip and pelvis  Any thoughts on diagnostic ultrasound? I told him I'd talk to you and see what you think     Thank you,  Maira Hill

## 2020-01-16 DIAGNOSIS — F90.2 ADHD (ATTENTION DEFICIT HYPERACTIVITY DISORDER), COMBINED TYPE: ICD-10-CM

## 2020-01-16 RX ORDER — ATOMOXETINE 40 MG/1
CAPSULE ORAL
Qty: 180 CAPSULE | Refills: 0 | Status: SHIPPED | OUTPATIENT
Start: 2020-01-16 | End: 2020-04-13 | Stop reason: SDUPTHER

## 2020-01-18 ENCOUNTER — HOSPITAL ENCOUNTER (OUTPATIENT)
Dept: ULTRASOUND IMAGING | Facility: HOSPITAL | Age: 50
Discharge: HOME | End: 2020-01-18
Payer: COMMERCIAL

## 2020-01-18 DIAGNOSIS — R10.32 LEFT GROIN PAIN: ICD-10-CM

## 2020-01-18 PROCEDURE — 76705 ECHO EXAM OF ABDOMEN: CPT

## 2020-01-21 ENCOUNTER — OFFICE VISIT (OUTPATIENT)
Dept: PHYSICAL THERAPY | Facility: CLINIC | Age: 50
End: 2020-01-21
Payer: COMMERCIAL

## 2020-01-21 DIAGNOSIS — Z98.890 S/P SHOULDER SURGERY: Primary | ICD-10-CM

## 2020-01-21 PROCEDURE — 97140 MANUAL THERAPY 1/> REGIONS: CPT | Performed by: PHYSICAL THERAPIST

## 2020-01-21 NOTE — PROGRESS NOTES
Daily Note     Today's date: 2020  Patient name: Lydia Cox  : 1970  MRN: [id number]  Referring provider: Joe Figueroa MD  Dx:   Encounter Diagnosis     ICD-10-CM    1  S/P shoulder surgery [id number]                   Subjective: Patient saw MD at the beginning of this week and he recommended he continue PT with addition of IASTM  Objective: See treatment diary below      Assessment: Tolerated treatment well  Focused treatment on manuals and soft tissue restrictions surrounding scapula  Soft tissue restrictions most present along lateral border of scap  Patient would benefit from continued PT      Plan: Continue per plan of care        Precautions: s/p R shoulder surgery (DOS 19) - follow MD protocol  Re-cert due 1238  Manual     R shoulder ROM to marquis x15 mins x15 mins x15 min Resume IR PROM and posterior capsule stretch x   IASTM to R scap borders and UT     x   Posterior capsule mobs     x           Total times     x25 mins       Exercise Diary     HEP instruction    Updated written HEP provided to patient    Progress to TE below beginning 19        Pulleys 10" x10   Flex /scap 10''x10 10" x10 Flex/Scap     Finger ladder 5" x10 flex/scap 5''x10 Flexion/Scaption  --     Cane AAROM flexion and ER 10" x10 ea Standing  10''x10 each Standing  --     Wall slides ITY 5"x15 ea ITY 5"x10 ea ITY 5" x10 ea     Sidelying AROM flexion        Sidelying AROM IR/ER        Isometric shoulder flex/abd/ext/IR/ER 5" x10 ea Flex/ABD/ IR/ER/Ext  TB IR/ER Red TB x20     MTP w/ TB Karla TB 5" 2x10 Green 5''20x Green 5'' 20x     LTP w/ TB Karla TB 5" 2x10 Green 5''20x  Green 5'' 20x     Standing flex to 90* 2# 2x10 2# 2x10  3# 2x10     Standing Scap to 90* 2# 2x10 2# 2x10  3# 2x10     ABC's 1# x1 1# 2x10  2# x1     Supine flex  1# x20 1# 20x  2# x20     Supine Hrz ABD  1# x20 1# 20x 2# x20     Prone Flex NV 2x10  2# 2x10     Prone HRZ ABD NV 2x10  2# 2x10     Prone Ext NV 2x10  2# 2x10     Sleeper stretch    sidelying  10"x10    Towel IR stretch    10"x10            UBE   3' fwd/3' retro 120 RPM  2  5'fwd/2  5'retro 80 rpm       Modalities  12/24 12/26 12/31 1/7 1/21   CP post tx x10 min  x10 mins x10 min

## 2020-01-25 ENCOUNTER — APPOINTMENT (OUTPATIENT)
Dept: LAB | Facility: HOSPITAL | Age: 50
End: 2020-01-25
Payer: COMMERCIAL

## 2020-01-25 DIAGNOSIS — E78.00 PURE HYPERCHOLESTEROLEMIA: ICD-10-CM

## 2020-01-25 LAB
CHOLEST SERPL-MCNC: 183 MG/DL (ref 0–200)
HDLC SERPL-MCNC: 42 MG/DL
LDLC SERPL CALC-MCNC: 119 MG/DL (ref 0–100)
NONHDLC SERPL-MCNC: 141 MG/DL
PSA SERPL-MCNC: 0.8 NG/ML (ref 0–4)
TRIGL SERPL-MCNC: 111 MG/DL (ref 44–166)

## 2020-01-25 PROCEDURE — 36415 COLL VENOUS BLD VENIPUNCTURE: CPT | Performed by: PHYSICIAN ASSISTANT

## 2020-01-25 PROCEDURE — 80061 LIPID PANEL: CPT

## 2020-01-25 PROCEDURE — G0103 PSA SCREENING: HCPCS | Performed by: PHYSICIAN ASSISTANT

## 2020-01-28 ENCOUNTER — APPOINTMENT (OUTPATIENT)
Dept: PHYSICAL THERAPY | Facility: CLINIC | Age: 50
End: 2020-01-28
Payer: COMMERCIAL

## 2020-03-06 DIAGNOSIS — G25.81 RLS (RESTLESS LEGS SYNDROME): ICD-10-CM

## 2020-03-06 RX ORDER — ROPINIROLE 1 MG/1
1 TABLET, FILM COATED ORAL
Qty: 90 TABLET | Refills: 1 | Status: SHIPPED | OUTPATIENT
Start: 2020-03-06 | End: 2020-08-24

## 2020-03-24 DIAGNOSIS — E78.00 PURE HYPERCHOLESTEROLEMIA: ICD-10-CM

## 2020-03-24 RX ORDER — EZETIMIBE 10 MG/1
10 TABLET ORAL DAILY
Qty: 90 TABLET | Refills: 0 | Status: SHIPPED | OUTPATIENT
Start: 2020-03-24 | End: 2020-06-15

## 2020-04-13 DIAGNOSIS — F90.2 ADHD (ATTENTION DEFICIT HYPERACTIVITY DISORDER), COMBINED TYPE: ICD-10-CM

## 2020-04-13 DIAGNOSIS — F32.A DEPRESSION, UNSPECIFIED DEPRESSION TYPE: ICD-10-CM

## 2020-04-14 RX ORDER — ATOMOXETINE 40 MG/1
40 CAPSULE ORAL 2 TIMES DAILY
Qty: 180 CAPSULE | Refills: 0 | Status: SHIPPED | OUTPATIENT
Start: 2020-04-14 | End: 2020-10-11 | Stop reason: SDUPTHER

## 2020-04-14 RX ORDER — FLUOXETINE HYDROCHLORIDE 20 MG/1
20 CAPSULE ORAL 2 TIMES DAILY
Qty: 180 CAPSULE | Refills: 1 | Status: SHIPPED | OUTPATIENT
Start: 2020-04-14 | End: 2020-12-07

## 2020-04-17 ENCOUNTER — TELEMEDICINE (OUTPATIENT)
Dept: INTERNAL MEDICINE CLINIC | Facility: CLINIC | Age: 50
End: 2020-04-17
Payer: COMMERCIAL

## 2020-04-17 DIAGNOSIS — Z12.11 SCREEN FOR COLON CANCER: ICD-10-CM

## 2020-04-17 DIAGNOSIS — M25.542 ARTHRALGIA OF BOTH HANDS: Primary | ICD-10-CM

## 2020-04-17 DIAGNOSIS — G25.81 RLS (RESTLESS LEGS SYNDROME): ICD-10-CM

## 2020-04-17 DIAGNOSIS — M25.511 PAIN OF BOTH SHOULDER JOINTS: ICD-10-CM

## 2020-04-17 DIAGNOSIS — M25.541 ARTHRALGIA OF BOTH HANDS: Primary | ICD-10-CM

## 2020-04-17 DIAGNOSIS — R20.2 PARESTHESIA: ICD-10-CM

## 2020-04-17 DIAGNOSIS — M25.512 PAIN OF BOTH SHOULDER JOINTS: ICD-10-CM

## 2020-04-17 PROBLEM — J02.0 STREP THROAT: Status: RESOLVED | Noted: 2019-09-04 | Resolved: 2020-04-17

## 2020-04-17 PROCEDURE — 99214 OFFICE O/P EST MOD 30 MIN: CPT | Performed by: INTERNAL MEDICINE

## 2020-04-20 ENCOUNTER — APPOINTMENT (OUTPATIENT)
Dept: LAB | Facility: CLINIC | Age: 50
End: 2020-04-20
Payer: COMMERCIAL

## 2020-04-20 DIAGNOSIS — M25.511 PAIN OF BOTH SHOULDER JOINTS: ICD-10-CM

## 2020-04-20 DIAGNOSIS — M25.541 ARTHRALGIA OF BOTH HANDS: ICD-10-CM

## 2020-04-20 DIAGNOSIS — M25.512 PAIN OF BOTH SHOULDER JOINTS: ICD-10-CM

## 2020-04-20 DIAGNOSIS — R20.2 PARESTHESIA: ICD-10-CM

## 2020-04-20 DIAGNOSIS — M25.542 ARTHRALGIA OF BOTH HANDS: ICD-10-CM

## 2020-04-20 LAB
CRP SERPL QL: <3 MG/L
VIT B12 SERPL-MCNC: 640 PG/ML (ref 100–900)

## 2020-04-20 PROCEDURE — 84165 PROTEIN E-PHORESIS SERUM: CPT | Performed by: PATHOLOGY

## 2020-04-20 PROCEDURE — 36415 COLL VENOUS BLD VENIPUNCTURE: CPT

## 2020-04-20 PROCEDURE — 86618 LYME DISEASE ANTIBODY: CPT

## 2020-04-20 PROCEDURE — 86430 RHEUMATOID FACTOR TEST QUAL: CPT

## 2020-04-20 PROCEDURE — 86140 C-REACTIVE PROTEIN: CPT

## 2020-04-20 PROCEDURE — 82607 VITAMIN B-12: CPT

## 2020-04-20 PROCEDURE — 84165 PROTEIN E-PHORESIS SERUM: CPT

## 2020-04-20 PROCEDURE — 86038 ANTINUCLEAR ANTIBODIES: CPT

## 2020-04-21 LAB
B BURGDOR IGG+IGM SER-ACNC: <0.91 ISR (ref 0–0.9)
RHEUMATOID FACT SER QL LA: NEGATIVE

## 2020-04-22 LAB
ALBUMIN SERPL ELPH-MCNC: 4.49 G/DL (ref 3.5–5)
ALBUMIN SERPL ELPH-MCNC: 64.2 % (ref 52–65)
ALPHA1 GLOB SERPL ELPH-MCNC: 0.22 G/DL (ref 0.1–0.4)
ALPHA1 GLOB SERPL ELPH-MCNC: 3.2 % (ref 2.5–5)
ALPHA2 GLOB SERPL ELPH-MCNC: 0.57 G/DL (ref 0.4–1.2)
ALPHA2 GLOB SERPL ELPH-MCNC: 8.1 % (ref 7–13)
BETA GLOB ABNORMAL SERPL ELPH-MCNC: 0.39 G/DL (ref 0.4–0.8)
BETA1 GLOB SERPL ELPH-MCNC: 5.5 % (ref 5–13)
BETA2 GLOB SERPL ELPH-MCNC: 5.1 % (ref 2–8)
BETA2+GAMMA GLOB SERPL ELPH-MCNC: 0.36 G/DL (ref 0.2–0.5)
GAMMA GLOB ABNORMAL SERPL ELPH-MCNC: 0.97 G/DL (ref 0.5–1.6)
GAMMA GLOB SERPL ELPH-MCNC: 13.9 % (ref 12–22)
IGG/ALB SER: 1.79 {RATIO} (ref 1.1–1.8)
PROT PATTERN SERPL ELPH-IMP: ABNORMAL
PROT SERPL-MCNC: 7 G/DL (ref 6.4–8.2)
RYE IGE QN: NEGATIVE

## 2020-04-23 DIAGNOSIS — M25.541 ARTHRALGIA OF BOTH HANDS: Primary | ICD-10-CM

## 2020-04-23 DIAGNOSIS — M25.542 ARTHRALGIA OF BOTH HANDS: Primary | ICD-10-CM

## 2020-04-28 ENCOUNTER — TELEPHONE (OUTPATIENT)
Dept: RHEUMATOLOGY | Facility: CLINIC | Age: 50
End: 2020-04-28

## 2020-04-28 ENCOUNTER — TELEMEDICINE (OUTPATIENT)
Dept: RHEUMATOLOGY | Facility: CLINIC | Age: 50
End: 2020-04-28
Payer: COMMERCIAL

## 2020-04-28 DIAGNOSIS — M25.512 CHRONIC PAIN OF BOTH SHOULDERS: ICD-10-CM

## 2020-04-28 DIAGNOSIS — M25.542 ARTHRALGIA OF BOTH HANDS: Primary | ICD-10-CM

## 2020-04-28 DIAGNOSIS — G89.29 CHRONIC PAIN OF BOTH SHOULDERS: ICD-10-CM

## 2020-04-28 DIAGNOSIS — M25.541 ARTHRALGIA OF BOTH HANDS: Primary | ICD-10-CM

## 2020-04-28 DIAGNOSIS — M25.511 CHRONIC PAIN OF BOTH SHOULDERS: ICD-10-CM

## 2020-04-28 PROCEDURE — 99242 OFF/OP CONSLTJ NEW/EST SF 20: CPT | Performed by: INTERNAL MEDICINE

## 2020-04-28 RX ORDER — PREDNISONE 20 MG/1
20 TABLET ORAL DAILY
Qty: 7 TABLET | Refills: 0 | Status: SHIPPED | OUTPATIENT
Start: 2020-04-28 | End: 2020-05-20

## 2020-05-05 DIAGNOSIS — R79.89 LOW TESTOSTERONE: ICD-10-CM

## 2020-05-05 RX ORDER — TESTOSTERONE 30 MG/1.5ML
4 SOLUTION TOPICAL 4 TIMES DAILY
Qty: 540 ML | Refills: 5 | Status: SHIPPED | OUTPATIENT
Start: 2020-05-05 | End: 2021-02-08

## 2020-05-20 ENCOUNTER — TELEMEDICINE (OUTPATIENT)
Dept: INTERNAL MEDICINE CLINIC | Facility: CLINIC | Age: 50
End: 2020-05-20
Payer: COMMERCIAL

## 2020-05-20 DIAGNOSIS — L23.7 ALLERGIC CONTACT DERMATITIS DUE TO PLANTS, EXCEPT FOOD: Primary | ICD-10-CM

## 2020-05-20 DIAGNOSIS — R21 RASH: ICD-10-CM

## 2020-05-20 PROCEDURE — 1036F TOBACCO NON-USER: CPT | Performed by: NURSE PRACTITIONER

## 2020-05-20 PROCEDURE — 3075F SYST BP GE 130 - 139MM HG: CPT | Performed by: NURSE PRACTITIONER

## 2020-05-20 PROCEDURE — 3079F DIAST BP 80-89 MM HG: CPT | Performed by: NURSE PRACTITIONER

## 2020-05-20 PROCEDURE — 99213 OFFICE O/P EST LOW 20 MIN: CPT | Performed by: NURSE PRACTITIONER

## 2020-05-20 RX ORDER — METHYLPREDNISOLONE 4 MG/1
TABLET ORAL
Qty: 21 EACH | Refills: 0 | Status: SHIPPED | OUTPATIENT
Start: 2020-05-20 | End: 2020-08-24 | Stop reason: ALTCHOICE

## 2020-05-26 ENCOUNTER — TELEPHONE (OUTPATIENT)
Dept: INTERNAL MEDICINE CLINIC | Facility: CLINIC | Age: 50
End: 2020-05-26

## 2020-05-26 DIAGNOSIS — L25.5 RHUS DERMATITIS: Primary | ICD-10-CM

## 2020-05-26 RX ORDER — PREDNISONE 20 MG/1
20 TABLET ORAL 2 TIMES DAILY WITH MEALS
Qty: 10 TABLET | Refills: 0 | Status: SHIPPED | OUTPATIENT
Start: 2020-05-26 | End: 2020-05-31

## 2020-06-12 ENCOUNTER — OFFICE VISIT (OUTPATIENT)
Dept: URGENT CARE | Facility: CLINIC | Age: 50
End: 2020-06-12
Payer: COMMERCIAL

## 2020-06-12 VITALS
WEIGHT: 164.6 LBS | SYSTOLIC BLOOD PRESSURE: 132 MMHG | OXYGEN SATURATION: 99 % | HEIGHT: 66 IN | DIASTOLIC BLOOD PRESSURE: 84 MMHG | HEART RATE: 99 BPM | TEMPERATURE: 97.6 F | RESPIRATION RATE: 20 BRPM | BODY MASS INDEX: 26.45 KG/M2

## 2020-06-12 DIAGNOSIS — L25.9 CONTACT DERMATITIS, UNSPECIFIED CONTACT DERMATITIS TYPE, UNSPECIFIED TRIGGER: Primary | ICD-10-CM

## 2020-06-12 PROCEDURE — 99213 OFFICE O/P EST LOW 20 MIN: CPT | Performed by: PHYSICIAN ASSISTANT

## 2020-06-12 RX ORDER — PREDNISONE 10 MG/1
TABLET ORAL
Qty: 26 TABLET | Refills: 0 | Status: SHIPPED | OUTPATIENT
Start: 2020-06-12 | End: 2020-08-04

## 2020-06-15 DIAGNOSIS — E78.00 PURE HYPERCHOLESTEROLEMIA: ICD-10-CM

## 2020-06-15 RX ORDER — EZETIMIBE 10 MG/1
10 TABLET ORAL DAILY
Qty: 90 TABLET | Refills: 1 | Status: SHIPPED | OUTPATIENT
Start: 2020-06-15 | End: 2020-12-07

## 2020-06-15 RX ORDER — EZETIMIBE 10 MG/1
TABLET ORAL
Qty: 90 TABLET | Refills: 1 | Status: SHIPPED | OUTPATIENT
Start: 2020-06-15 | End: 2020-06-15 | Stop reason: SDUPTHER

## 2020-06-23 ENCOUNTER — TELEPHONE (OUTPATIENT)
Dept: RHEUMATOLOGY | Facility: CLINIC | Age: 50
End: 2020-06-23

## 2020-06-26 ENCOUNTER — HOSPITAL ENCOUNTER (OUTPATIENT)
Dept: RADIOLOGY | Facility: HOSPITAL | Age: 50
Discharge: HOME | End: 2020-06-26
Payer: COMMERCIAL

## 2020-06-26 ENCOUNTER — APPOINTMENT (OUTPATIENT)
Dept: LAB | Facility: HOSPITAL | Age: 50
End: 2020-06-26
Payer: COMMERCIAL

## 2020-06-26 DIAGNOSIS — M25.542 ARTHRALGIA OF BOTH HANDS: ICD-10-CM

## 2020-06-26 DIAGNOSIS — M25.541 ARTHRALGIA OF BOTH HANDS: ICD-10-CM

## 2020-06-26 LAB — URATE SERPL-MCNC: 4.1 MG/DL (ref 2.3–7.6)

## 2020-06-26 PROCEDURE — 84550 ASSAY OF BLOOD/URIC ACID: CPT

## 2020-06-26 PROCEDURE — 36415 COLL VENOUS BLD VENIPUNCTURE: CPT

## 2020-06-26 PROCEDURE — 73130 X-RAY EXAM OF HAND: CPT

## 2020-06-27 ENCOUNTER — APPOINTMENT (OUTPATIENT)
Dept: LAB | Facility: HOSPITAL | Age: 50
End: 2020-06-27
Payer: COMMERCIAL

## 2020-06-27 ENCOUNTER — TRANSCRIBE ORDERS (OUTPATIENT)
Dept: LAB | Facility: HOSPITAL | Age: 50
End: 2020-06-27

## 2020-06-27 DIAGNOSIS — M25.542 ARTHRALGIA OF BOTH HANDS: ICD-10-CM

## 2020-06-27 DIAGNOSIS — M25.541 ARTHRALGIA OF BOTH HANDS: ICD-10-CM

## 2020-06-27 LAB
HBV CORE AB SER QL: NORMAL
HBV CORE IGM SER QL: NORMAL
HBV SURFACE AG SER QL: NORMAL
HCV AB SER QL: NORMAL

## 2020-06-27 PROCEDURE — 87340 HEPATITIS B SURFACE AG IA: CPT

## 2020-06-27 PROCEDURE — 86704 HEP B CORE ANTIBODY TOTAL: CPT

## 2020-06-27 PROCEDURE — 36415 COLL VENOUS BLD VENIPUNCTURE: CPT

## 2020-06-27 PROCEDURE — 86200 CCP ANTIBODY: CPT

## 2020-06-27 PROCEDURE — 86803 HEPATITIS C AB TEST: CPT

## 2020-06-27 PROCEDURE — 86705 HEP B CORE ANTIBODY IGM: CPT

## 2020-07-01 LAB — CCP IGA+IGG SERPL IA-ACNC: 6 UNITS (ref 0–19)

## 2020-07-16 ENCOUNTER — TELEPHONE (OUTPATIENT)
Dept: OBGYN CLINIC | Facility: HOSPITAL | Age: 50
End: 2020-07-16

## 2020-07-16 NOTE — TELEPHONE ENCOUNTER
Patient has a f/u visit on 09/30 and has been calling to check for cancellations, however there's nothing sooner with Dr Alanna Gan  Patient states that he's been off the steroid prescribed well over a month now and and during, it cleared up his issue right away, but once the steroid stopped, the issue came right back      Callback M#[phone]

## 2020-07-20 ENCOUNTER — TELEPHONE (OUTPATIENT)
Dept: RHEUMATOLOGY | Facility: CLINIC | Age: 50
End: 2020-07-20

## 2020-07-20 NOTE — TELEPHONE ENCOUNTER
Called patient to offer him a sooner appointment on 7/21   I ask he call back before 4pm if he can take the 1 30pm slot

## 2020-08-04 ENCOUNTER — OFFICE VISIT (OUTPATIENT)
Dept: RHEUMATOLOGY | Facility: CLINIC | Age: 50
End: 2020-08-04
Payer: COMMERCIAL

## 2020-08-04 VITALS
DIASTOLIC BLOOD PRESSURE: 84 MMHG | TEMPERATURE: 98.7 F | HEIGHT: 66 IN | HEART RATE: 88 BPM | WEIGHT: 164.68 LBS | SYSTOLIC BLOOD PRESSURE: 138 MMHG | BODY MASS INDEX: 26.47 KG/M2

## 2020-08-04 DIAGNOSIS — G89.29 CHRONIC PAIN OF BOTH SHOULDERS: ICD-10-CM

## 2020-08-04 DIAGNOSIS — M19.90 INFLAMMATORY ARTHRITIS: Primary | ICD-10-CM

## 2020-08-04 DIAGNOSIS — M25.512 CHRONIC PAIN OF BOTH SHOULDERS: ICD-10-CM

## 2020-08-04 DIAGNOSIS — M25.511 CHRONIC PAIN OF BOTH SHOULDERS: ICD-10-CM

## 2020-08-04 DIAGNOSIS — M79.671 BILATERAL FOOT PAIN: ICD-10-CM

## 2020-08-04 DIAGNOSIS — M79.672 BILATERAL FOOT PAIN: ICD-10-CM

## 2020-08-04 PROCEDURE — 3008F BODY MASS INDEX DOCD: CPT | Performed by: INTERNAL MEDICINE

## 2020-08-04 PROCEDURE — 99214 OFFICE O/P EST MOD 30 MIN: CPT | Performed by: INTERNAL MEDICINE

## 2020-08-04 PROCEDURE — 3075F SYST BP GE 130 - 139MM HG: CPT | Performed by: INTERNAL MEDICINE

## 2020-08-04 PROCEDURE — 3079F DIAST BP 80-89 MM HG: CPT | Performed by: INTERNAL MEDICINE

## 2020-08-04 PROCEDURE — 1036F TOBACCO NON-USER: CPT | Performed by: INTERNAL MEDICINE

## 2020-08-04 NOTE — PROGRESS NOTES
Assessment and Plan:   Mr Alejandro Funes is a 51-year-old  male with history significant for bilateral shoulder osteoarthritis and rotator cuff tears status post surgery, and lumbar degenerative arthritis, who presents for follow-up of bilateral hand pain  Whitrika Victor presents today for a follow-up of bilateral hand pain primarily affecting the 3rd PIP and MCP joints, with symptoms initially starting in March 2020  Along with these areas of joint pains there had also been a slight flare-up affecting his bilateral shoulders and toes  He does follow with Orthopedics for the bilateral foot pain and received intra-articular cortisone injections which helps  Since our visit in April I had initially prescribed him a 7 day course of prednisone and he was also on a longer course of steroids through his primary care physician for a diagnosis of poison ivy  He reports while on the steroids this helps significantly with the hand arthralgias, but they have returned to a lesser degree now that he has been off any prednisone since June  - His physical examination today does reveal mild soft tissue swelling at the right hand 3rd MCP joint  Given the distribution of his symptoms I would be concerned for an inflammatory arthropathy such as seronegative rheumatoid arthritis, but as his symptoms are so focal in nature at this time I would like him to follow-up with orthopedics for consideration of intra-articular cortisone injections into the right hand 3rd PIP and MCP joint  If this controls his symptoms over the next few months I will avoid starting him on systemic therapy  If his symptoms are persistent/recurrent then at the follow-up visit I will likely plan to start him on DMARDs        - I will see him back at the end of October to assess his response to the intra-articular cortisone injections, but I advised him to contact me before that if the injection is ineffective or the hand arthralgias worsen        Plan:  Diagnoses and all orders for this visit:    Inflammatory arthritis    Chronic pain of both shoulders    Bilateral foot pain      I have personally reviewed pertinent films in PACS of the right hand x-ray and I question if there may be a small erosive change at the 3rd MCP  Activities as tolerated    Diet: low carb/low fat, more greens/vegetables, adequate hydration  Exercise: try to maintain a low impact exercise regimen as much as possible  Walk for 30 minutes a day for at least 3 days a week    Encouraged to maintain good sleep hygiene  Continue other medications as prescribed by PCP and other specialists        RTC in 2-3 months  HPI    INITIAL VISIT NOTE (4/2020):  Mr Wagner Vasquez is a 41-year-old  male with history significant for bilateral shoulder osteoarthritis and rotator cuff tears status post surgery, and lumbar degenerative arthritis, who presents for further evaluation of bilateral hand pain  He is referred by Riley Moreira PA-C for a rheumatology consult      Patient reports a few weeks ago he started to notice pain affecting his right hand 3rd digit between the PIP and MCP joints  He states that the pain started spontaneously and he was unable to attribute any triggering factors such as a prior infection or injury  He states shortly afterwards he started to notice similar symptoms affecting his left hand 3rd digit, but that resolved spontaneously within 10 days  The right hand symptoms have persisted  He has not noticed any pain throughout the rest of the small joints of his hands  He states around the same time he started to notice pain affecting his bilateral shoulders as well as his right foot 2nd toe  He reports a history of shoulder osteoarthritis as well as rotator cuff tears with surgery performed in 2016 for the left side and for the right side in December 2019  He denies any pain throughout his wrists, elbows, hips, knees or ankles    He has not noticed any obviously swollen joints, but thinks that his right hand 3rd MCP may be slightly puffy  He does not experience any morning stiffness  He does notice intermittent numbness affecting his bilateral arms  He has been taking Tylenol p m  to help him sleep, but has not tried taking any over-the-counter pain medications for his joint symptoms      He denies fevers, unintentional weight loss, inflammatory eye disease, skin rash, psoriasis, inflammatory bowel disease, recent infections or family history of autoimmune disease      He was evaluated by his primary care physician recently and had blood work done which showed a negative ONEIL, rheumatoid factor, C reactive protein, Lyme antibody profile and SPEP         8/4/2020:  Patient presents for a follow-up today  We reviewed the official report of his right hand x-ray which was read as normal   The hepatitis panel, anti CCP antibody and uric acid level was normal     At the last office visit I had prescribed him prednisone 20 mg once daily for 7 days, which he states helped significantly with clearing up all his joint pains  He reports as soon as he completed that course the pain started to recur in his right hand and shoulders  Over the next 1 month he was again started on varying courses of steroids for poison ivy, but states that while he was on it it helped significantly with his joint pains  He has not been on any steroids since the end of June  He is not taking any over-the-counter pain medications currently  He reports pain that is primarily affecting his right hand middle finger PIP and MCP joints that he rates at 3/10 in intensity  He feels like it may be starting in his left hand as well in the same location  He does report chronic pain of his bilateral shoulders  He describes pain of his bilateral feet as well but does follow-up with orthopedics for cortisone injections which have significantly helped    He denies any joint pains of the remainder of his hands, wrists, elbows, hips, knees or ankles  He does appreciate mild swelling at his right hand 3rd MCP joint  He denies any other swollen joints  He does not really experience any morning stiffness  He has an upcoming appointment with Orthopedics for consideration of cortisone injections of the right 3rd PIP and MCP joint  The following portions of the patient's history were reviewed and updated as appropriate: allergies, current medications, past family history, past medical history, past social history, past surgical history and problem list       Review of Systems  Constitutional: Negative for weight change, fevers, chills, night sweats, fatigue  ENT/Mouth: Negative for hearing changes, ear pain, nasal congestion, sinus pain, hoarseness, sore throat, rhinorrhea, swallowing difficulty  Eyes: Negative for pain, redness, discharge, vision changes  Cardiovascular: Negative for chest pain, SOB, palpitations  Respiratory: Negative for cough, sputum, wheezing, dyspnea  Gastrointestinal: Negative for nausea, vomiting, diarrhea, constipation, pain, heartburn  Genitourinary: Negative for dysuria, urinary frequency, hematuria  Musculoskeletal: As per HPI  Skin: Negative for skin rash, color changes  Neuro: Negative for weakness, numbness, tingling, loss of consciousness  Psych: Negative for anxiety, depression  Heme/Lymph: Negative for easy bruising, bleeding, lymphadenopathy          Past Medical History:   Diagnosis Date    ADD (attention deficit disorder)     Anxiety     DDD (degenerative disc disease), lumbar 10/22/2018    Depression     ED (erectile dysfunction)     Groin strain     Hyperlipidemia     Shoulder pain, bilateral        Past Surgical History:   Procedure Laterality Date    ELBOW SURGERY      [facility] INJECTION RIGHT HIP (ARTHROGRAM)  12/24/2019    NASAL SEPTUM SURGERY      SHOULDER SURGERY Right 12/04/2019    VASECTOMY         Social History Socioeconomic History    Marital status: /Civil Union     Spouse name: Not on file    Number of children: Not on file    Years of education: Not on file    Highest education level: Not on file   Occupational History    Occupation: Marketing   Social Needs    Financial resource strain: Not on file    Food insecurity     Worry: Not on file     Inability: Not on file   [employer] needs     Medical: Not on file     Non-medical: Not on file   Tobacco Use    Smoking status: Never Smoker    Smokeless tobacco: Never Used   Substance and Sexual Activity    Alcohol use: Yes     Comment: Social    Drug use: No    Sexual activity: Not on file   Lifestyle    Physical activity     Days per week: Not on file     Minutes per session: Not on file    Stress: Not on file   Relationships    Social connections     Talks on phone: Not on file     Gets together: Not on file     Attends [employer] service: Not on file     Active member of club or organization: Not on file     Attends meetings of clubs or organizations: Not on file     Relationship status: Not on file    Intimate partner violence     Fear of current or ex partner: Not on file     Emotionally abused: Not on file     Physically abused: Not on file     Forced sexual activity: Not on file   Other Topics Concern    Not on file   Social History Narrative    Daily Caffeine Consumption           Family History   Problem Relation Age of Onset    No Known Problems Mother     No Known Problems Father        Allergies   Allergen Reactions    Adhesive [Medical Tape] Rash       Current Outpatient Medications:     atoMOXetine (STRATTERA) 40 mg capsule, Take 1 capsule (40 mg total) by mouth 2 (two) times a day, Disp: 180 capsule, Rfl: 0    ezetimibe (ZETIA) 10 mg tablet, Take 1 tablet (10 mg total) by mouth daily, Disp: 90 tablet, Rfl: 1    FLUoxetine (PROzac) 20 mg capsule, Take 1 capsule (20 mg total) by mouth 2 (two) times a day, Disp: 180 capsule, Rfl: 1    methylPREDNISolone 4 MG tablet therapy pack, Use as directed on package, Disp: 21 each, Rfl: 0    rOPINIRole (REQUIP) 1 mg tablet, Take 1 tablet (1 mg total) by mouth daily at bedtime, Disp: 90 tablet, Rfl: 1    Testosterone 30 MG/ACT SOLN, 4 Act (120 mg total) by Pump route 4 (four) times a day, Disp: 540 mL, Rfl: 5    acyclovir (ZOVIRAX) 200 mg capsule, Take 1 capsule (200 mg total) by mouth 5 (five) times a day for 7 days, Disp: 35 capsule, Rfl: 0      Objective:    Vitals:    08/04/20 1334   BP: 138/84   BP Location: Right arm   Patient Position: Sitting   Cuff Size: Standard   Pulse: 88   Temp: 98 7 °F (37 1 °C)   TempSrc: Tympanic   Weight: 74 7 kg (164 lb 10 9 oz)   Height: 5' 6"       Physical Exam  General: Well appearing, well nourished, in no distress  Oriented x 3, normal mood and affect  Ambulating without difficulty  Skin: Good turgor, no rash, unusual bruising or prominent lesions  Nails: Normal color, no deformities  HEENT:  Head: Normocephalic, atraumatic  Eyes: Conjunctiva clear, sclera non-icteric, EOM intact  Extremities: No amputations or deformities, cyanosis, edema  Musculoskeletal:   Hands - there is mild tenderness with soft tissue swelling at his right hand 3rd MCP and PIP joint  The remainder of his bilateral DIPs, PIPs and MCPs are unremarkable  Wrists, elbows and shoulders - unremarkable  Knees, ankles and feet - unremarkable  Neurologic: Alert and oriented  No focal neurological deficits appreciated  Psychiatric: Normal mood and affect  SAMPSON Cedillo    Rheumatology

## 2020-08-07 ENCOUNTER — OFFICE VISIT (OUTPATIENT)
Dept: OBGYN CLINIC | Facility: CLINIC | Age: 50
End: 2020-08-07
Payer: COMMERCIAL

## 2020-08-07 VITALS
HEIGHT: 66 IN | DIASTOLIC BLOOD PRESSURE: 77 MMHG | SYSTOLIC BLOOD PRESSURE: 121 MMHG | HEART RATE: 71 BPM | WEIGHT: 164.9 LBS | BODY MASS INDEX: 26.5 KG/M2

## 2020-08-07 DIAGNOSIS — M79.644 PAIN OF FINGER OF RIGHT HAND: Primary | ICD-10-CM

## 2020-08-07 DIAGNOSIS — S63.632A SPRAIN OF INTERPHALANGEAL JOINT OF RIGHT MIDDLE FINGER, INITIAL ENCOUNTER: ICD-10-CM

## 2020-08-07 PROCEDURE — 99243 OFF/OP CNSLTJ NEW/EST LOW 30: CPT | Performed by: ORTHOPAEDIC SURGERY

## 2020-08-07 PROCEDURE — 1036F TOBACCO NON-USER: CPT | Performed by: ORTHOPAEDIC SURGERY

## 2020-08-07 NOTE — PROGRESS NOTES
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        PAST MEDICAL HISTORY:  Past Medical History:   Diagnosis Date    ADD (attention deficit disorder)     Anxiety     DDD (degenerative disc disease), lumbar 10/22/2018    Depression     ED (erectile dysfunction)     Groin strain     Hyperlipidemia     Shoulder pain, bilateral        PAST SURGICAL HISTORY:  Past Surgical History:   Procedure Laterality Date    ELBOW SURGERY      [place] INJECTION RIGHT HIP (ARTHROGRAM)  12/24/2019    NASAL SEPTUM SURGERY      SHOULDER SURGERY Right 12/04/2019    VASECTOMY         FAMILY HISTORY:  Family History   Problem Relation Age of Onset    No Known Problems Mother     No Known Problems Father        SOCIAL HISTORY:  Social History     Tobacco Use    Smoking status: Never Smoker    Smokeless tobacco: Never Used   Substance Use Topics    Alcohol use: Yes     Comment: Social    Drug use: No       MEDICATIONS:    Current Outpatient Medications:     atoMOXetine (STRATTERA) 40 mg capsule, Take 1 capsule (40 mg total) by mouth 2 (two) times a day, Disp: 180 capsule, Rfl: 0    ezetimibe (ZETIA) 10 mg tablet, Take 1 tablet (10 mg total) by mouth daily, Disp: 90 tablet, Rfl: 1    FLUoxetine (PROzac) 20 mg capsule, Take 1 capsule (20 mg total) by mouth 2 (two) times a day, Disp: 180 capsule, Rfl: 1    methylPREDNISolone 4 MG tablet therapy pack, Use as directed on package, Disp: 21 each, Rfl: 0    rOPINIRole (REQUIP) 1 mg tablet, Take 1 tablet (1 mg total) by mouth daily at bedtime, Disp: 90 tablet, Rfl: 1    Testosterone 30 MG/ACT SOLN, 4 Act (120 mg total) by Pump route 4 (four) times a day, Disp: 540 mL, Rfl: 5    acyclovir (ZOVIRAX) 200 mg capsule, Take 1 capsule (200 mg total) by mouth 5 (five) times a day for 7 days, Disp: 35 capsule, Rfl: 0    ALLERGIES:  Allergies   Allergen Reactions    Adhesive [Medical Tape] Rash       REVIEW OF SYSTEMS:  Review of Systems    VITALS:  Vitals:    08/07/20 0812   BP: 121/77   Pulse: 71       LABS:  HgA1c: No results found for: HGBA1C  BMP:   Lab Results   Component Value Date    CALCIUM 8 7 07/26/2019    K 3 9 07/26/2019    CO2 30 07/26/2019     07/26/2019    BUN 25 07/26/2019    CREATININE 1 04 07/26/2019       _____________________________________________________  PHYSICAL EXAMINATION:  General: well developed and well nourished, alert, oriented times 3 and appears comfortable  Psychiatric: Normal  HEENT: Trachea Midline, No torticollis  Pulmonary: No audible wheezing or strider  Cardiovascular: No discernable arrhythmia   Skin: No masses, erythema, lacerations, fluctation, ulcerations  Neurovascular: Sensation Intact to the Median, Ulnar, Radial Nerve, Motor Intact to the Median, Ulnar, Radial Nerve and Pulses Intact    MUSCULOSKELETAL EXAMINATION:    Right Hand: long finger  Skin intact  Stable collateral ligaments at the MP joint, pain with stress of the radial collateral ligament  Full composite fist  No tenderness  Minimal swelling PIP joint  No triggering or locking  Extensor and flexor tendons are intact    No central slip noted  Neurovascularly intact distally    ___________________________________________________  STUDIES REVIEWED:  Images obtained on 6/26/2020 of the right hand 3 views demonstrate : no degenerative changes, otherwise unremarkable      PROCEDURES PERFORMED:  Procedures  No Procedures performed today    _____________________________________________________  ASSESSMENT/PLAN:      Diagnoses and all orders for this visit:    Pain of finger of right hand  Comments:  possible trigger finger    Sprain of interphalangeal joint of right middle finger, initial encounter      · Possible early trigger finger of his right long finger, possible MP joint strain of RCL  · He has slight discomfort with stressing radial collateral ligament of his PIP joint however there is no laxity or instability  · Activities as tolerated  · If symptoms return may call for an appointment for repeat examination and possible cortisone injection    Follow Up:  Return for re-check on an as needed basis (PRN)  Work/school status: no restrictions    To Do Next Visit:  Re-evaluation of current issue    General Discussions:  Possible early trigger finger however asymptomatic today  Discomfort with stressing radial collateral ligament of his right long finger at the PIP joint however there is no gross laxity    Operative Discussions:  None indicated or scheduled    Scribe Attestation    I,:   Zenaida Contreras am acting as a scribe while in the presence of the attending physician :        I,:   Sharda Alexander MD personally performed the services described in this documentation    as scribed in my presence :          Portions of the record may have been created with voice recognition software  Occasional wrong word or "sound a like" substitutions may have occurred due to the inherent limitations of voice recognition software  Read the chart carefully and recognize, using context, where substitutions have occurred

## 2020-08-07 NOTE — LETTER
August 7, 2020     Garry Ricketts PA-C  [facility]  [address]    Patient: Mathew Rojas   YOB: 1970   Date of Visit: 8/7/2020       Dear Dr Coleen Hanson: Thank you for referring Do Lerma to me for evaluation  Below are my notes for this consultation  If you have questions, please do not hesitate to call me  I look forward to following your patient along with you  Sincerely,        Sharda Alexander MD        CC: No Recipients  Sharda Alexander MD  8/7/2020  9:31 AM  Signed  CHIEF COMPLAINT:  Chief Complaint   Patient presents with    Right Hand - Pain, Clicking       SUBJECTIVE:  Mathew Rojas is a 48y o  year old RHD male who presents at request of Dr Nelly Brewster regarding pain at his right long finger  Patient states his symptoms are intermittent and will vary  He notes clicking and pain throughout his right long finger both dorsally and palmar aspect  No specific injury or traumatic onset  He was treated previously for his multiple arthralgias with steroids with improvement of his hand pain  Denies any numbness or tingling  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        PAST MEDICAL HISTORY:  Past Medical History:   Diagnosis Date    ADD (attention deficit disorder)     Anxiety     DDD (degenerative disc disease), lumbar 10/22/2018    Depression     ED (erectile dysfunction)     Groin strain     Hyperlipidemia     Shoulder pain, bilateral        PAST SURGICAL HISTORY:  Past Surgical History:   Procedure Laterality Date    ELBOW SURGERY      FL INJECTION RIGHT HIP (ARTHROGRAM)  12/24/2019    NASAL SEPTUM SURGERY      SHOULDER SURGERY Right 12/04/2019    VASECTOMY         FAMILY HISTORY:  Family History   Problem Relation Age of Onset    No Known Problems Mother     No Known Problems Father        SOCIAL HISTORY:  Social History     Tobacco Use    Smoking status: Never Smoker    Smokeless tobacco: Never Used   Substance Use Topics    Alcohol use: Yes     Comment: Social    Drug use: No       MEDICATIONS:    Current Outpatient Medications:     atoMOXetine (STRATTERA) 40 mg capsule, Take 1 capsule (40 mg total) by mouth 2 (two) times a day, Disp: 180 capsule, Rfl: 0    ezetimibe (ZETIA) 10 mg tablet, Take 1 tablet (10 mg total) by mouth daily, Disp: 90 tablet, Rfl: 1    FLUoxetine (PROzac) 20 mg capsule, Take 1 capsule (20 mg total) by mouth 2 (two) times a day, Disp: 180 capsule, Rfl: 1    methylPREDNISolone 4 MG tablet therapy pack, Use as directed on package, Disp: 21 each, Rfl: 0    rOPINIRole (REQUIP) 1 mg tablet, Take 1 tablet (1 mg total) by mouth daily at bedtime, Disp: 90 tablet, Rfl: 1    Testosterone 30 MG/ACT SOLN, 4 Act (120 mg total) by Pump route 4 (four) times a day, Disp: 540 mL, Rfl: 5    acyclovir (ZOVIRAX) 200 mg capsule, Take 1 capsule (200 mg total) by mouth 5 (five) times a day for 7 days, Disp: 35 capsule, Rfl: 0    ALLERGIES:  Allergies   Allergen Reactions    Adhesive [Medical Tape] Rash       REVIEW OF SYSTEMS:  Review of Systems    VITALS:  Vitals:    08/07/20 0812   BP: 121/77   Pulse: 71       LABS:  HgA1c: No results found for: HGBA1C  BMP:   Lab Results   Component Value Date    CALCIUM 8 7 07/26/2019    K 3 9 07/26/2019    CO2 30 07/26/2019     07/26/2019    BUN 25 07/26/2019    CREATININE 1 04 07/26/2019       _____________________________________________________  PHYSICAL EXAMINATION:  General: well developed and well nourished, alert, oriented times 3 and appears comfortable  Psychiatric: Normal  HEENT: Trachea Midline, No torticollis  Pulmonary: No audible wheezing or strider  Cardiovascular: No discernable arrhythmia   Skin: No masses, erythema, lacerations, fluctation, ulcerations  Neurovascular: Sensation Intact to the Median, Ulnar, Radial Nerve, Motor Intact to the Median, Ulnar, Radial Nerve and Pulses Intact    MUSCULOSKELETAL EXAMINATION:    Right Hand: long finger  Skin intact  Stable collateral ligaments at the MP joint, pain with stress of the radial collateral ligament  Full composite fist  No tenderness  Minimal swelling PIP joint  No triggering or locking  Extensor and flexor tendons are intact  No central slip noted  Neurovascularly intact distally    ___________________________________________________  STUDIES REVIEWED:  Images obtained on 6/26/2020 of the right hand 3 views demonstrate : no degenerative changes, otherwise unremarkable      PROCEDURES PERFORMED:  Procedures  No Procedures performed today    _____________________________________________________  ASSESSMENT/PLAN:      Diagnoses and all orders for this visit:    Pain of finger of right hand  Comments:  possible trigger finger    Sprain of interphalangeal joint of right middle finger, initial encounter      · Possible early trigger finger of his right long finger, possible MP joint strain of RCL  · He has slight discomfort with stressing radial collateral ligament of his PIP joint however there is no laxity or instability  · Activities as tolerated  · If symptoms return may call for an appointment for repeat examination and possible cortisone injection    Follow Up:  Return for re-check on an as needed basis (PRN)  Work/school status: no restrictions    To Do Next Visit:  Re-evaluation of current issue    General Discussions:  Possible early trigger finger however asymptomatic today  Discomfort with stressing radial collateral ligament of his right long finger at the PIP joint however there is no gross laxity    Operative Discussions:  None indicated or scheduled    Scribe Attestation    I,:   Marquis Brown am acting as a scribe while in the presence of the attending physician :        I,:   Jeancarlos Lassiter MD personally performed the services described in this documentation    as scribed in my presence :          Portions of the record may have been created with voice recognition software    Occasional wrong word or "sound a like" substitutions may have occurred due to the inherent limitations of voice recognition software  Read the chart carefully and recognize, using context, where substitutions have occurred

## 2020-08-19 ENCOUNTER — TELEPHONE (OUTPATIENT)
Dept: INTERNAL MEDICINE CLINIC | Facility: CLINIC | Age: 50
End: 2020-08-19

## 2020-08-19 DIAGNOSIS — Z13.0 SCREENING FOR DEFICIENCY ANEMIA: ICD-10-CM

## 2020-08-19 DIAGNOSIS — E55.9 VITAMIN D DEFICIENCY: ICD-10-CM

## 2020-08-19 DIAGNOSIS — Z13.29 SCREENING FOR THYROID DISORDER: ICD-10-CM

## 2020-08-19 DIAGNOSIS — Z13.1 SCREENING FOR DIABETES MELLITUS: ICD-10-CM

## 2020-08-19 DIAGNOSIS — E78.00 PURE HYPERCHOLESTEROLEMIA: Primary | ICD-10-CM

## 2020-08-19 NOTE — TELEPHONE ENCOUNTER
Pt has appt on Monday and would like to have routine labs done  Could you please put labs in and let pt know?  Thanks

## 2020-08-20 ENCOUNTER — APPOINTMENT (OUTPATIENT)
Dept: LAB | Facility: CLINIC | Age: 50
End: 2020-08-20
Payer: COMMERCIAL

## 2020-08-20 DIAGNOSIS — Z13.1 SCREENING FOR DIABETES MELLITUS: ICD-10-CM

## 2020-08-20 DIAGNOSIS — Z13.29 SCREENING FOR THYROID DISORDER: ICD-10-CM

## 2020-08-20 DIAGNOSIS — E55.9 VITAMIN D DEFICIENCY: ICD-10-CM

## 2020-08-20 DIAGNOSIS — Z13.0 SCREENING FOR DEFICIENCY ANEMIA: ICD-10-CM

## 2020-08-20 DIAGNOSIS — E78.00 PURE HYPERCHOLESTEROLEMIA: ICD-10-CM

## 2020-08-20 LAB
25(OH)D3 SERPL-MCNC: 82.5 NG/ML (ref 30–100)
ALBUMIN SERPL BCP-MCNC: 3.8 G/DL (ref 3.5–5)
ALP SERPL-CCNC: 60 U/L (ref 46–116)
ALT SERPL W P-5'-P-CCNC: 27 U/L (ref 12–78)
ANION GAP SERPL CALCULATED.3IONS-SCNC: 6 MMOL/L (ref 4–13)
AST SERPL W P-5'-P-CCNC: 17 U/L (ref 5–45)
BASOPHILS # BLD AUTO: 0.03 THOUSANDS/ΜL (ref 0–0.1)
BASOPHILS NFR BLD AUTO: 1 % (ref 0–1)
BILIRUB SERPL-MCNC: 0.54 MG/DL (ref 0.2–1)
BUN SERPL-MCNC: 18 MG/DL (ref 5–25)
CALCIUM SERPL-MCNC: 8.7 MG/DL (ref 8.3–10.1)
CHLORIDE SERPL-SCNC: 108 MMOL/L (ref 100–108)
CHOLEST SERPL-MCNC: 201 MG/DL (ref 50–200)
CO2 SERPL-SCNC: 27 MMOL/L (ref 21–32)
CREAT SERPL-MCNC: 1.07 MG/DL (ref 0.6–1.3)
EOSINOPHIL # BLD AUTO: 0.45 THOUSAND/ΜL (ref 0–0.61)
EOSINOPHIL NFR BLD AUTO: 9 % (ref 0–6)
ERYTHROCYTE [DISTWIDTH] IN BLOOD BY AUTOMATED COUNT: 12.3 % (ref 11.6–15.1)
GFR SERPL CREATININE-BSD FRML MDRD: 81 ML/MIN/1.73SQ M
GLUCOSE P FAST SERPL-MCNC: 82 MG/DL (ref 65–99)
HCT VFR BLD AUTO: 49.1 % (ref 36.5–49.3)
HDLC SERPL-MCNC: 43 MG/DL
HGB BLD-MCNC: 16.5 G/DL (ref 12–17)
IMM GRANULOCYTES # BLD AUTO: 0.01 THOUSAND/UL (ref 0–0.2)
IMM GRANULOCYTES NFR BLD AUTO: 0 % (ref 0–2)
LDLC SERPL CALC-MCNC: 127 MG/DL (ref 0–100)
LYMPHOCYTES # BLD AUTO: 1.53 THOUSANDS/ΜL (ref 0.6–4.47)
LYMPHOCYTES NFR BLD AUTO: 32 % (ref 14–44)
MCH RBC QN AUTO: 29.5 PG (ref 26.8–34.3)
MCHC RBC AUTO-ENTMCNC: 33.6 G/DL (ref 31.4–37.4)
MCV RBC AUTO: 88 FL (ref 82–98)
MONOCYTES # BLD AUTO: 0.44 THOUSAND/ΜL (ref 0.17–1.22)
MONOCYTES NFR BLD AUTO: 9 % (ref 4–12)
NEUTROPHILS # BLD AUTO: 2.39 THOUSANDS/ΜL (ref 1.85–7.62)
NEUTS SEG NFR BLD AUTO: 49 % (ref 43–75)
NONHDLC SERPL-MCNC: 158 MG/DL
NRBC BLD AUTO-RTO: 0 /100 WBCS
PLATELET # BLD AUTO: 252 THOUSANDS/UL (ref 149–390)
PMV BLD AUTO: 9.3 FL (ref 8.9–12.7)
POTASSIUM SERPL-SCNC: 4 MMOL/L (ref 3.5–5.3)
PROT SERPL-MCNC: 7.1 G/DL (ref 6.4–8.2)
RBC # BLD AUTO: 5.6 MILLION/UL (ref 3.88–5.62)
SODIUM SERPL-SCNC: 141 MMOL/L (ref 136–145)
TRIGL SERPL-MCNC: 154 MG/DL
TSH SERPL DL<=0.05 MIU/L-ACNC: 1.78 UIU/ML (ref 0.36–3.74)
WBC # BLD AUTO: 4.85 THOUSAND/UL (ref 4.31–10.16)

## 2020-08-20 PROCEDURE — 84443 ASSAY THYROID STIM HORMONE: CPT

## 2020-08-20 PROCEDURE — 80053 COMPREHEN METABOLIC PANEL: CPT

## 2020-08-20 PROCEDURE — 82306 VITAMIN D 25 HYDROXY: CPT

## 2020-08-20 PROCEDURE — 80061 LIPID PANEL: CPT

## 2020-08-20 PROCEDURE — 85025 COMPLETE CBC W/AUTO DIFF WBC: CPT

## 2020-08-20 PROCEDURE — 36415 COLL VENOUS BLD VENIPUNCTURE: CPT

## 2020-08-24 ENCOUNTER — OFFICE VISIT (OUTPATIENT)
Dept: INTERNAL MEDICINE CLINIC | Facility: CLINIC | Age: 50
End: 2020-08-24
Payer: COMMERCIAL

## 2020-08-24 ENCOUNTER — APPOINTMENT (OUTPATIENT)
Dept: LAB | Facility: CLINIC | Age: 50
End: 2020-08-24
Payer: COMMERCIAL

## 2020-08-24 VITALS
HEIGHT: 66 IN | HEART RATE: 80 BPM | WEIGHT: 164.4 LBS | TEMPERATURE: 96.5 F | RESPIRATION RATE: 16 BRPM | SYSTOLIC BLOOD PRESSURE: 138 MMHG | BODY MASS INDEX: 26.42 KG/M2 | DIASTOLIC BLOOD PRESSURE: 78 MMHG | OXYGEN SATURATION: 98 %

## 2020-08-24 DIAGNOSIS — F51.01 PRIMARY INSOMNIA: Primary | ICD-10-CM

## 2020-08-24 DIAGNOSIS — E29.1 TESTICULAR HYPOGONADISM: ICD-10-CM

## 2020-08-24 DIAGNOSIS — E78.00 PURE HYPERCHOLESTEROLEMIA: ICD-10-CM

## 2020-08-24 DIAGNOSIS — G25.81 RLS (RESTLESS LEGS SYNDROME): ICD-10-CM

## 2020-08-24 DIAGNOSIS — R53.82 CHRONIC FATIGUE: ICD-10-CM

## 2020-08-24 LAB
FERRITIN SERPL-MCNC: 198 NG/ML (ref 8–388)
IRON SATN MFR SERPL: 40 %
IRON SERPL-MCNC: 140 UG/DL (ref 65–175)
TIBC SERPL-MCNC: 348 UG/DL (ref 250–450)

## 2020-08-24 PROCEDURE — 84403 ASSAY OF TOTAL TESTOSTERONE: CPT

## 2020-08-24 PROCEDURE — 3078F DIAST BP <80 MM HG: CPT | Performed by: PHYSICIAN ASSISTANT

## 2020-08-24 PROCEDURE — 82728 ASSAY OF FERRITIN: CPT

## 2020-08-24 PROCEDURE — 3008F BODY MASS INDEX DOCD: CPT | Performed by: PHYSICIAN ASSISTANT

## 2020-08-24 PROCEDURE — 1036F TOBACCO NON-USER: CPT | Performed by: PHYSICIAN ASSISTANT

## 2020-08-24 PROCEDURE — 3725F SCREEN DEPRESSION PERFORMED: CPT | Performed by: PHYSICIAN ASSISTANT

## 2020-08-24 PROCEDURE — 84402 ASSAY OF FREE TESTOSTERONE: CPT

## 2020-08-24 PROCEDURE — 36415 COLL VENOUS BLD VENIPUNCTURE: CPT

## 2020-08-24 PROCEDURE — 99214 OFFICE O/P EST MOD 30 MIN: CPT | Performed by: PHYSICIAN ASSISTANT

## 2020-08-24 PROCEDURE — 83550 IRON BINDING TEST: CPT

## 2020-08-24 PROCEDURE — 3075F SYST BP GE 130 - 139MM HG: CPT | Performed by: PHYSICIAN ASSISTANT

## 2020-08-24 PROCEDURE — 83540 ASSAY OF IRON: CPT

## 2020-08-24 RX ORDER — PHENOL 1.4 %
AEROSOL, SPRAY (ML) MUCOUS MEMBRANE
COMMUNITY
End: 2020-10-19

## 2020-08-24 RX ORDER — ROPINIROLE 2 MG/1
2 TABLET, FILM COATED, EXTENDED RELEASE ORAL
Qty: 30 TABLET | Refills: 3 | Status: SHIPPED | OUTPATIENT
Start: 2020-08-24 | End: 2020-12-28 | Stop reason: SDUPTHER

## 2020-08-24 RX ORDER — TRAZODONE HYDROCHLORIDE 50 MG/1
50-100 TABLET ORAL
Qty: 60 TABLET | Refills: 0 | Status: SHIPPED | OUTPATIENT
Start: 2020-08-24 | End: 2020-09-16

## 2020-08-24 NOTE — PROGRESS NOTES
Assessment/Plan:  Problem List Items Addressed This Visit        Endocrine    Testicular hypogonadism    Relevant Orders    Testosterone, free, total       Other    Pure hypercholesterolemia    RLS (restless legs syndrome)     Will change requip to Requip XL 2mg at bedtime  Will check iron panel to ensure this is not causing his symptoms         Relevant Medications    rOPINIRole (REQUIP XL) 2 MG 24 hr tablet    Primary insomnia - Primary     Start trazodone  Directions for use and possible side effects discussed and patient verbalized understanding of these  Relevant Medications    traZODone (DESYREL) 50 mg tablet      Other Visit Diagnoses     Chronic fatigue        Relevant Orders    Iron Panel (Includes Ferritin, Iron Sat%, Iron, and TIBC)           Diagnoses and all orders for this visit:    Primary insomnia  -     traZODone (DESYREL) 50 mg tablet; Take 1-2 tablets ( mg total) by mouth daily at bedtime    Chronic fatigue  -     Iron Panel (Includes Ferritin, Iron Sat%, Iron, and TIBC); Future    Testicular hypogonadism  -     Testosterone, free, total; Future    Pure hypercholesterolemia    RLS (restless legs syndrome)  -     rOPINIRole (REQUIP XL) 2 MG 24 hr tablet; Take 1 tablet (2 mg total) by mouth daily at bedtime    Other orders  -     Melatonin 10 MG TABS; Take by mouth daily at bedtime Taking 2 at night        RLS (restless legs syndrome)  Will change requip to Requip XL 2mg at bedtime  Will check iron panel to ensure this is not causing his symptoms    Primary insomnia  Start trazodone  Directions for use and possible side effects discussed and patient verbalized understanding of these  Subjective:      Patient ID: Federico Hassan is a 48 y o  male  Pt presents for routine visit  He is doing well overall  He has been following with rheumatology for his arthralgias  He notes ongoing RLS symtpoms and chronic fatigue   He is also having insomnia as well despite trying 20mg of melatonin daily  The following portions of the patient's history were reviewed and updated as appropriate:   He has a past medical history of ADD (attention deficit disorder), Anxiety, DDD (degenerative disc disease), lumbar (10/22/2018), Depression, ED (erectile dysfunction), Groin strain, Hyperlipidemia, and Shoulder pain, bilateral ,  does not have any pertinent problems on file  ,   has a past surgical history that includes Elbow surgery; Vasectomy; Nasal septum surgery; Shoulder surgery (Right, 12/04/2019); and FL injection right hip (arthrogram) (12/24/2019)  ,  family history includes No Known Problems in his father and mother  ,   reports that he has never smoked  He has never used smokeless tobacco  He reports current alcohol use  He reports that he does not use drugs  ,  is allergic to adhesive [medical tape]     Current Outpatient Medications   Medication Sig Dispense Refill    acyclovir (ZOVIRAX) 200 mg capsule Take 1 capsule (200 mg total) by mouth 5 (five) times a day for 7 days 35 capsule 0    atoMOXetine (STRATTERA) 40 mg capsule Take 1 capsule (40 mg total) by mouth 2 (two) times a day 180 capsule 0    ezetimibe (ZETIA) 10 mg tablet Take 1 tablet (10 mg total) by mouth daily 90 tablet 1    FLUoxetine (PROzac) 20 mg capsule Take 1 capsule (20 mg total) by mouth 2 (two) times a day 180 capsule 1    Melatonin 10 MG TABS Take by mouth daily at bedtime Taking 2 at night      Testosterone 30 MG/ACT SOLN 4 Act (120 mg total) by Pump route 4 (four) times a day 540 mL 5    rOPINIRole (REQUIP XL) 2 MG 24 hr tablet Take 1 tablet (2 mg total) by mouth daily at bedtime 30 tablet 3    traZODone (DESYREL) 50 mg tablet Take 1-2 tablets ( mg total) by mouth daily at bedtime 60 tablet 0     No current facility-administered medications for this visit  Review of Systems   Constitutional: Positive for fatigue  Negative for chills and fever     HENT: Negative for congestion, ear pain, hearing loss, postnasal drip, rhinorrhea, sinus pressure, sinus pain, sore throat and trouble swallowing  Eyes: Negative for pain and visual disturbance  Respiratory: Negative for cough, chest tightness, shortness of breath and wheezing  Cardiovascular: Negative  Negative for chest pain, palpitations and leg swelling  Gastrointestinal: Negative for abdominal pain, blood in stool, constipation, diarrhea, nausea and vomiting  Endocrine: Negative for cold intolerance, heat intolerance, polydipsia, polyphagia and polyuria  Genitourinary: Negative for difficulty urinating, dysuria, flank pain and urgency  Musculoskeletal: Positive for arthralgias  Negative for back pain, gait problem and myalgias  Skin: Negative for rash  Allergic/Immunologic: Negative  Neurological: Negative for dizziness, weakness, light-headedness and headaches  Hematological: Negative  Psychiatric/Behavioral: Negative for behavioral problems, dysphoric mood and sleep disturbance  The patient is not nervous/anxious  PHQ-9 Depression Screening    PHQ-9:    Frequency of the following problems over the past two weeks:       Little interest or pleasure in doing things:  0 - not at all  Feeling down, depressed, or hopeless:  0 - not at all  PHQ-2 Score:  0          Objective:  Vitals:    08/24/20 0801   BP: 138/78   BP Location: Left arm   Patient Position: Sitting   Cuff Size: Adult   Pulse: 80   Resp: 16   Temp: (!) 96 5 °F (35 8 °C)   TempSrc: Tympanic   SpO2: 98%   Weight: 74 6 kg (164 lb 6 4 oz)   Height: 5' 6" (1 676 m)     Body mass index is 26 53 kg/m²  Physical Exam  Constitutional:       General: He is not in acute distress  Appearance: He is well-developed  He is not diaphoretic  HENT:      Head: Normocephalic and atraumatic  Right Ear: External ear normal       Left Ear: External ear normal       Nose: Nose normal       Mouth/Throat:      Pharynx: No oropharyngeal exudate     Eyes:      General: No scleral icterus  Right eye: No discharge  Left eye: No discharge  Conjunctiva/sclera: Conjunctivae normal       Pupils: Pupils are equal, round, and reactive to light  Neck:      Musculoskeletal: Normal range of motion and neck supple  Thyroid: No thyromegaly  Cardiovascular:      Rate and Rhythm: Normal rate and regular rhythm  Heart sounds: Normal heart sounds  No murmur  No friction rub  No gallop  Pulmonary:      Effort: Pulmonary effort is normal  No respiratory distress  Breath sounds: Normal breath sounds  No wheezing or rales  Abdominal:      General: Bowel sounds are normal  There is no distension  Palpations: Abdomen is soft  Tenderness: There is no abdominal tenderness  Musculoskeletal: Normal range of motion  General: No tenderness or deformity  Skin:     General: Skin is warm and dry  Neurological:      Mental Status: He is alert and oriented to person, place, and time  Cranial Nerves: No cranial nerve deficit  Psychiatric:         Behavior: Behavior normal          Thought Content:  Thought content normal          Judgment: Judgment normal

## 2020-08-24 NOTE — ASSESSMENT & PLAN NOTE
Will change requip to Requip XL 2mg at bedtime   Will check iron panel to ensure this is not causing his symptoms

## 2020-08-25 LAB
TESTOST FREE SERPL-MCNC: 9.4 PG/ML (ref 7.2–24)
TESTOST SERPL-MCNC: 367 NG/DL (ref 264–916)

## 2020-09-08 ENCOUNTER — TELEPHONE (OUTPATIENT)
Dept: RHEUMATOLOGY | Facility: CLINIC | Age: 50
End: 2020-09-08

## 2020-09-08 DIAGNOSIS — M25.562 CHRONIC PAIN OF LEFT KNEE: Primary | ICD-10-CM

## 2020-09-08 DIAGNOSIS — G89.29 CHRONIC PAIN OF LEFT KNEE: Primary | ICD-10-CM

## 2020-09-08 RX ORDER — MELOXICAM 15 MG/1
15 TABLET ORAL DAILY PRN
Qty: 14 TABLET | Refills: 0 | Status: SHIPPED | OUTPATIENT
Start: 2020-09-08 | End: 2020-09-24 | Stop reason: SDUPTHER

## 2020-09-16 DIAGNOSIS — F51.01 PRIMARY INSOMNIA: ICD-10-CM

## 2020-09-16 RX ORDER — TRAZODONE HYDROCHLORIDE 50 MG/1
50-100 TABLET ORAL
Qty: 60 TABLET | Refills: 0 | Status: SHIPPED | OUTPATIENT
Start: 2020-09-16 | End: 2020-10-16

## 2020-09-24 ENCOUNTER — TELEPHONE (OUTPATIENT)
Dept: RHEUMATOLOGY | Facility: CLINIC | Age: 50
End: 2020-09-24

## 2020-09-24 DIAGNOSIS — G89.29 CHRONIC PAIN OF LEFT KNEE: ICD-10-CM

## 2020-09-24 DIAGNOSIS — M25.562 CHRONIC PAIN OF LEFT KNEE: ICD-10-CM

## 2020-09-24 RX ORDER — MELOXICAM 15 MG/1
15 TABLET ORAL DAILY PRN
Qty: 90 TABLET | Refills: 0 | Status: SHIPPED | OUTPATIENT
Start: 2020-09-24 | End: 2020-12-12 | Stop reason: SDUPTHER

## 2020-10-11 DIAGNOSIS — F90.2 ADHD (ATTENTION DEFICIT HYPERACTIVITY DISORDER), COMBINED TYPE: ICD-10-CM

## 2020-10-12 RX ORDER — ATOMOXETINE 40 MG/1
40 CAPSULE ORAL 2 TIMES DAILY
Qty: 180 CAPSULE | Refills: 1 | Status: SHIPPED | OUTPATIENT
Start: 2020-10-12 | End: 2021-03-29

## 2020-10-16 DIAGNOSIS — F51.01 PRIMARY INSOMNIA: ICD-10-CM

## 2020-10-16 RX ORDER — TRAZODONE HYDROCHLORIDE 50 MG/1
50-100 TABLET ORAL
Qty: 60 TABLET | Refills: 0 | Status: SHIPPED | OUTPATIENT
Start: 2020-10-16 | End: 2020-10-19

## 2020-10-19 ENCOUNTER — APPOINTMENT (OUTPATIENT)
Dept: RADIOLOGY | Facility: CLINIC | Age: 50
End: 2020-10-19
Payer: COMMERCIAL

## 2020-10-19 ENCOUNTER — OFFICE VISIT (OUTPATIENT)
Dept: INTERNAL MEDICINE CLINIC | Facility: CLINIC | Age: 50
End: 2020-10-19
Payer: COMMERCIAL

## 2020-10-19 DIAGNOSIS — F33.1 MODERATE EPISODE OF RECURRENT MAJOR DEPRESSIVE DISORDER (HCC): Primary | ICD-10-CM

## 2020-10-19 DIAGNOSIS — F51.01 PRIMARY INSOMNIA: ICD-10-CM

## 2020-10-19 DIAGNOSIS — Z23 NEED FOR INFLUENZA VACCINATION: ICD-10-CM

## 2020-10-19 DIAGNOSIS — M21.922 SHOULDER JOINT DEFORMITY, LEFT: ICD-10-CM

## 2020-10-19 DIAGNOSIS — B36.0 TINEA VERSICOLOR: ICD-10-CM

## 2020-10-19 PROCEDURE — 99214 OFFICE O/P EST MOD 30 MIN: CPT | Performed by: PHYSICIAN ASSISTANT

## 2020-10-19 PROCEDURE — 90471 IMMUNIZATION ADMIN: CPT | Performed by: PHYSICIAN ASSISTANT

## 2020-10-19 PROCEDURE — 3725F SCREEN DEPRESSION PERFORMED: CPT | Performed by: PHYSICIAN ASSISTANT

## 2020-10-19 PROCEDURE — 90682 RIV4 VACC RECOMBINANT DNA IM: CPT | Performed by: PHYSICIAN ASSISTANT

## 2020-10-19 PROCEDURE — 73030 X-RAY EXAM OF SHOULDER: CPT

## 2020-10-19 PROCEDURE — 1036F TOBACCO NON-USER: CPT | Performed by: PHYSICIAN ASSISTANT

## 2020-10-19 RX ORDER — CLOTRIMAZOLE AND BETAMETHASONE DIPROPIONATE 10; .64 MG/G; MG/G
CREAM TOPICAL 2 TIMES DAILY
Qty: 30 G | Refills: 0 | Status: SHIPPED | OUTPATIENT
Start: 2020-10-19 | End: 2022-04-29

## 2020-10-19 RX ORDER — QUETIAPINE FUMARATE 50 MG/1
50 TABLET, FILM COATED ORAL
Qty: 30 TABLET | Refills: 2 | Status: SHIPPED | OUTPATIENT
Start: 2020-10-19 | End: 2021-02-24

## 2020-10-19 RX ORDER — TRAZODONE HYDROCHLORIDE 50 MG/1
50-100 TABLET ORAL
Qty: 180 TABLET | Refills: 1 | Status: SHIPPED | OUTPATIENT
Start: 2020-10-19 | End: 2020-10-19

## 2020-10-20 PROBLEM — M21.922: Status: ACTIVE | Noted: 2020-10-20

## 2020-10-20 PROBLEM — B36.0 TINEA VERSICOLOR: Status: ACTIVE | Noted: 2020-10-20

## 2020-10-21 ENCOUNTER — TELEPHONE (OUTPATIENT)
Dept: INTERNAL MEDICINE CLINIC | Facility: CLINIC | Age: 50
End: 2020-10-21

## 2020-10-22 ENCOUNTER — TELEPHONE (OUTPATIENT)
Dept: INTERNAL MEDICINE CLINIC | Facility: CLINIC | Age: 50
End: 2020-10-22

## 2020-12-07 DIAGNOSIS — E78.00 PURE HYPERCHOLESTEROLEMIA: ICD-10-CM

## 2020-12-07 DIAGNOSIS — F32.A DEPRESSION, UNSPECIFIED DEPRESSION TYPE: ICD-10-CM

## 2020-12-07 DIAGNOSIS — G89.29 CHRONIC PAIN OF LEFT KNEE: ICD-10-CM

## 2020-12-07 DIAGNOSIS — M25.562 CHRONIC PAIN OF LEFT KNEE: ICD-10-CM

## 2020-12-07 RX ORDER — MELOXICAM 15 MG/1
15 TABLET ORAL DAILY PRN
Qty: 90 TABLET | Refills: 0 | OUTPATIENT
Start: 2020-12-07

## 2020-12-07 RX ORDER — EZETIMIBE 10 MG/1
TABLET ORAL
Qty: 90 TABLET | Refills: 1 | Status: SHIPPED | OUTPATIENT
Start: 2020-12-07 | End: 2021-06-14 | Stop reason: SDUPTHER

## 2020-12-07 RX ORDER — FLUOXETINE HYDROCHLORIDE 20 MG/1
CAPSULE ORAL
Qty: 180 CAPSULE | Refills: 1 | Status: SHIPPED | OUTPATIENT
Start: 2020-12-07 | End: 2022-04-29

## 2020-12-07 NOTE — TELEPHONE ENCOUNTER
Patient canceled his appointment in October, please check if he is going to follow up with me and then I can refill the meloxicam

## 2020-12-12 ENCOUNTER — OFFICE VISIT (OUTPATIENT)
Dept: INTERNAL MEDICINE CLINIC | Facility: CLINIC | Age: 50
End: 2020-12-12
Payer: COMMERCIAL

## 2020-12-12 VITALS
BODY MASS INDEX: 26.68 KG/M2 | HEART RATE: 81 BPM | RESPIRATION RATE: 18 BRPM | WEIGHT: 166 LBS | OXYGEN SATURATION: 95 % | DIASTOLIC BLOOD PRESSURE: 80 MMHG | HEIGHT: 66 IN | SYSTOLIC BLOOD PRESSURE: 136 MMHG | TEMPERATURE: 96.9 F

## 2020-12-12 DIAGNOSIS — F33.1 MODERATE EPISODE OF RECURRENT MAJOR DEPRESSIVE DISORDER (HCC): Primary | ICD-10-CM

## 2020-12-12 DIAGNOSIS — G25.81 RLS (RESTLESS LEGS SYNDROME): ICD-10-CM

## 2020-12-12 DIAGNOSIS — F90.2 ADHD (ATTENTION DEFICIT HYPERACTIVITY DISORDER), COMBINED TYPE: ICD-10-CM

## 2020-12-12 DIAGNOSIS — G89.29 CHRONIC PAIN OF LEFT KNEE: ICD-10-CM

## 2020-12-12 DIAGNOSIS — M25.562 CHRONIC PAIN OF LEFT KNEE: ICD-10-CM

## 2020-12-12 PROBLEM — M21.922: Status: RESOLVED | Noted: 2020-10-20 | Resolved: 2020-12-12

## 2020-12-12 PROCEDURE — 99214 OFFICE O/P EST MOD 30 MIN: CPT | Performed by: PHYSICIAN ASSISTANT

## 2020-12-12 PROCEDURE — 3008F BODY MASS INDEX DOCD: CPT | Performed by: PHYSICIAN ASSISTANT

## 2020-12-12 PROCEDURE — 1036F TOBACCO NON-USER: CPT | Performed by: PHYSICIAN ASSISTANT

## 2020-12-12 RX ORDER — MELOXICAM 15 MG/1
15 TABLET ORAL DAILY PRN
Qty: 90 TABLET | Refills: 0 | Status: SHIPPED | OUTPATIENT
Start: 2020-12-12 | End: 2021-03-03

## 2020-12-23 ENCOUNTER — TELEPHONE (OUTPATIENT)
Dept: INTERNAL MEDICINE CLINIC | Facility: CLINIC | Age: 50
End: 2020-12-23

## 2020-12-28 ENCOUNTER — PATIENT MESSAGE (OUTPATIENT)
Dept: INTERNAL MEDICINE CLINIC | Facility: CLINIC | Age: 50
End: 2020-12-28

## 2020-12-28 DIAGNOSIS — G25.81 RLS (RESTLESS LEGS SYNDROME): ICD-10-CM

## 2020-12-28 RX ORDER — ROPINIROLE 2 MG/1
2 TABLET, FILM COATED, EXTENDED RELEASE ORAL
Qty: 30 TABLET | Refills: 5 | Status: SHIPPED | OUTPATIENT
Start: 2020-12-28 | End: 2021-06-14 | Stop reason: SDUPTHER

## 2021-02-05 ENCOUNTER — OFFICE VISIT (OUTPATIENT)
Dept: OBGYN CLINIC | Facility: CLINIC | Age: 51
End: 2021-02-05
Payer: COMMERCIAL

## 2021-02-05 VITALS
HEART RATE: 72 BPM | WEIGHT: 166 LBS | BODY MASS INDEX: 26.68 KG/M2 | HEIGHT: 66 IN | SYSTOLIC BLOOD PRESSURE: 133 MMHG | DIASTOLIC BLOOD PRESSURE: 79 MMHG

## 2021-02-05 DIAGNOSIS — R79.89 LOW TESTOSTERONE: ICD-10-CM

## 2021-02-05 DIAGNOSIS — S63.632D SPRAIN OF INTERPHALANGEAL JOINT OF RIGHT MIDDLE FINGER, SUBSEQUENT ENCOUNTER: Primary | ICD-10-CM

## 2021-02-05 PROCEDURE — 99214 OFFICE O/P EST MOD 30 MIN: CPT | Performed by: ORTHOPAEDIC SURGERY

## 2021-02-05 PROCEDURE — 20600 DRAIN/INJ JOINT/BURSA W/O US: CPT | Performed by: ORTHOPAEDIC SURGERY

## 2021-02-05 RX ORDER — LIDOCAINE HYDROCHLORIDE 10 MG/ML
0.5 INJECTION, SOLUTION INFILTRATION; PERINEURAL
Status: COMPLETED | OUTPATIENT
Start: 2021-02-05 | End: 2021-02-05

## 2021-02-05 RX ORDER — TRAZODONE HYDROCHLORIDE 50 MG/1
TABLET ORAL
COMMUNITY
End: 2021-03-05 | Stop reason: SDUPTHER

## 2021-02-05 RX ORDER — TRIAMCINOLONE ACETONIDE 40 MG/ML
20 INJECTION, SUSPENSION INTRA-ARTICULAR; INTRAMUSCULAR
Status: COMPLETED | OUTPATIENT
Start: 2021-02-05 | End: 2021-02-05

## 2021-02-05 RX ORDER — SODIUM PICOSULFATE, MAGNESIUM OXIDE, AND ANHYDROUS CITRIC ACID 10; 3.5; 12 MG/160ML; G/160ML; G/160ML
LIQUID ORAL
COMMUNITY
End: 2021-02-24

## 2021-02-05 RX ADMIN — LIDOCAINE HYDROCHLORIDE 0.5 ML: 10 INJECTION, SOLUTION INFILTRATION; PERINEURAL at 08:57

## 2021-02-05 RX ADMIN — TRIAMCINOLONE ACETONIDE 20 MG: 40 INJECTION, SUSPENSION INTRA-ARTICULAR; INTRAMUSCULAR at 08:57

## 2021-02-05 NOTE — PROGRESS NOTES
CHIEF COMPLAINT:  Chief Complaint   Patient presents with    Right Middle Finger - Follow-up, Locking, Clicking       SUBJECTIVE:  Otoniel Nunez is a 46y o  year old RHD male who presents to the office today for a follow up regarding right long finger pain  He notes pain to the palmar aspect of his right long finger as well as pain from his PIP joint to his MCP joint  Lesly Michael states that his pain worsens with flexion of the finger  Lesly Michael denies any clicking, catching or locking  He did recently see a Rheumatologist who has placed him on Meloxicam which overall is helping  He states currently his pain is a 5/10         PAST MEDICAL HISTORY:  Past Medical History:   Diagnosis Date    ADD (attention deficit disorder)     Anxiety     DDD (degenerative disc disease), lumbar 10/22/2018    Depression     ED (erectile dysfunction)     Groin strain     Hyperlipidemia     Shoulder pain, bilateral        PAST SURGICAL HISTORY:  Past Surgical History:   Procedure Laterality Date    ELBOW SURGERY      FL INJECTION RIGHT HIP (ARTHROGRAM)  12/24/2019    NASAL SEPTUM SURGERY      SHOULDER SURGERY Right 12/04/2019    VASECTOMY         FAMILY HISTORY:  Family History   Problem Relation Age of Onset    No Known Problems Mother     No Known Problems Father        SOCIAL HISTORY:  Social History     Tobacco Use    Smoking status: Never Smoker    Smokeless tobacco: Never Used   Substance Use Topics    Alcohol use: Yes     Comment: Social    Drug use: No       MEDICATIONS:    Current Outpatient Medications:     acyclovir (ZOVIRAX) 200 mg capsule, Take 1 capsule (200 mg total) by mouth 5 (five) times a day for 7 days, Disp: 35 capsule, Rfl: 0    atoMOXetine (STRATTERA) 40 mg capsule, Take 1 capsule (40 mg total) by mouth 2 (two) times a day, Disp: 180 capsule, Rfl: 1    clotrimazole-betamethasone (LOTRISONE) 1-0 05 % cream, Apply topically 2 (two) times a day, Disp: 30 g, Rfl: 0    ezetimibe (ZETIA) 10 mg tablet, TAKE 1 TABLET BY MOUTH EVERY DAY, Disp: 90 tablet, Rfl: 1    FLUoxetine (PROzac) 20 mg capsule, TAKE 1 CAPSULE BY MOUTH TWICE A DAY, Disp: 180 capsule, Rfl: 1    meloxicam (MOBIC) 15 mg tablet, Take 1 tablet (15 mg total) by mouth daily as needed for moderate pain, Disp: 90 tablet, Rfl: 0    QUEtiapine (SEROquel) 50 mg tablet, Take 1 tablet (50 mg total) by mouth daily at bedtime, Disp: 30 tablet, Rfl: 2    rOPINIRole (REQUIP XL) 2 MG 24 hr tablet, Take 1 tablet (2 mg total) by mouth daily at bedtime, Disp: 30 tablet, Rfl: 5    Sod Picosulfate-Mag Ox-Cit Acd (Clenpiq) 10-3 5-12 MG-GM -GM/160ML SOLN, Clenpiq 10 mg-3 5 gram-12 gram/160 mL oral solution  USE AS DIRECTED, Disp: , Rfl:     Testosterone 30 MG/ACT SOLN, 4 Act (120 mg total) by Pump route 4 (four) times a day, Disp: 540 mL, Rfl: 5    traZODone (DESYREL) 50 mg tablet, trazodone 50 mg tablet  TAKE 1 2 TABLETS (50 100 MG TOTAL) BY MOUTH DAILY AT BEDTIME, Disp: , Rfl:     ALLERGIES:  Allergies   Allergen Reactions    Adhesive [Medical Tape] Rash       REVIEW OF SYSTEMS:  Review of Systems   Constitutional: Negative for chills, fever and unexpected weight change  HENT: Negative for hearing loss, nosebleeds and sore throat  Eyes: Negative for pain, redness and visual disturbance  Respiratory: Negative for cough, shortness of breath and wheezing  Cardiovascular: Negative for chest pain, palpitations and leg swelling  Gastrointestinal: Negative for abdominal pain, nausea and vomiting  Endocrine: Negative for polydipsia and polyuria  Genitourinary: Negative for difficulty urinating and hematuria  Musculoskeletal: Positive for arthralgias  Negative for joint swelling and myalgias  Skin: Negative for rash and wound  Neurological: Negative for dizziness, numbness and headaches  Psychiatric/Behavioral: Negative for decreased concentration, dysphoric mood and suicidal ideas  The patient is not nervous/anxious          VITALS:  Vitals: 02/05/21 0841   BP: 133/79   Pulse: 72       LABS:  HgA1c: No results found for: HGBA1C  BMP:   Lab Results   Component Value Date    CALCIUM 8 7 08/20/2020    K 4 0 08/20/2020    CO2 27 08/20/2020     08/20/2020    BUN 18 08/20/2020    CREATININE 1 07 08/20/2020       _____________________________________________________  PHYSICAL EXAMINATION:  General: well developed and well nourished, alert, oriented times 3 and appears comfortable  Psychiatric: Normal  HEENT: Trachea Midline, No torticollis  Pulmonary: No audible wheezing or strider  Cardiovascular: No discernable arrhythmia   Skin: No masses, erythema, lacerations, fluctation, ulcerations  Neurovascular: Sensation Intact to the Median, Ulnar, Radial Nerve, Motor Intact to the Median, Ulnar, Radial Nerve and Pulses Intact    MUSCULOSKELETAL EXAMINATION:    Right long finger:   No erythema, ecchymosis or edema  Mild pain with stressing of the MCP joint   Mild tenderness to the A1 pulley   No clicking or locking     ___________________________________________________  STUDIES REVIEWED:  No studies reviewed  PROCEDURES PERFORMED:  Small joint arthrocentesis: R long MCP  Universal Protocol:  Consent: Verbal consent obtained  Written consent not obtained  Risks and benefits: risks, benefits and alternatives were discussed  Consent given by: patient  Time out: Immediately prior to procedure a "time out" was called to verify the correct patient, procedure, equipment, support staff and site/side marked as required    Patient identity confirmed: verbally with patient    Supporting Documentation  Indications: pain   Procedure Details  Location: long finger - R long MCP  Preparation: Patient was prepped and draped in the usual sterile fashion  Needle size: 25 G  Medications administered: 0 5 mL lidocaine 1 %; 20 mg triamcinolone acetonide 40 mg/mL    Patient tolerance: patient tolerated the procedure well with no immediate complications  Dressing:  Sterile dressing applied           _____________________________________________________  ASSESSMENT/PLAN:    Right long finger early trigger finger vs MCP joint strain, RCL:   * Based on exam he seems to be more bothersome at the MCP joint   * Right long finger MCP joint CSI was performed in the office without complication   * Post injection protocol was discussed   * Follow up in 2 weeks time for re-evaluation     Follow Up:  Return in about 2 weeks (around 2/19/2021)      To Do Next Visit:  Re-evaluation of current issue      Scribe Attestation    I,:  Roxana Rodriguez am acting as a scribe while in the presence of the attending physician :       I,:  Alfa Jalloh MD personally performed the services described in this documentation    as scribed in my presence :

## 2021-02-08 RX ORDER — TESTOSTERONE 30 MG/1.5ML
4 SOLUTION TOPICAL 4 TIMES DAILY
Qty: 540 ML | Refills: 0 | Status: SHIPPED | OUTPATIENT
Start: 2021-02-08 | End: 2021-09-22 | Stop reason: SDUPTHER

## 2021-02-24 ENCOUNTER — OFFICE VISIT (OUTPATIENT)
Dept: INTERNAL MEDICINE CLINIC | Facility: CLINIC | Age: 51
End: 2021-02-24
Payer: COMMERCIAL

## 2021-02-24 VITALS
SYSTOLIC BLOOD PRESSURE: 130 MMHG | RESPIRATION RATE: 14 BRPM | BODY MASS INDEX: 26.2 KG/M2 | DIASTOLIC BLOOD PRESSURE: 80 MMHG | OXYGEN SATURATION: 98 % | HEART RATE: 73 BPM | HEIGHT: 66 IN | WEIGHT: 163 LBS | TEMPERATURE: 97.6 F

## 2021-02-24 DIAGNOSIS — E29.1 TESTICULAR HYPOGONADISM: ICD-10-CM

## 2021-02-24 DIAGNOSIS — F90.2 ADHD (ATTENTION DEFICIT HYPERACTIVITY DISORDER), COMBINED TYPE: Primary | ICD-10-CM

## 2021-02-24 DIAGNOSIS — E78.00 PURE HYPERCHOLESTEROLEMIA: ICD-10-CM

## 2021-02-24 PROCEDURE — 3008F BODY MASS INDEX DOCD: CPT | Performed by: PHYSICIAN ASSISTANT

## 2021-02-24 PROCEDURE — 1036F TOBACCO NON-USER: CPT | Performed by: PHYSICIAN ASSISTANT

## 2021-02-24 PROCEDURE — 99213 OFFICE O/P EST LOW 20 MIN: CPT | Performed by: PHYSICIAN ASSISTANT

## 2021-02-24 NOTE — PROGRESS NOTES
Assessment/Plan:  Problem List Items Addressed This Visit        Endocrine    Testicular hypogonadism     Update labs and adjust treatment accordingly  Relevant Orders    Testosterone, free, total       Other    Pure hypercholesterolemia    Relevant Orders    Lipid Panel with Direct LDL reflex    ADHD (attention deficit hyperactivity disorder), combined type - Primary     Pt will try increasing strattera as recommended at last visit  Diagnoses and all orders for this visit:    ADHD (attention deficit hyperactivity disorder), combined type    Pure hypercholesterolemia  -     Lipid Panel with Direct LDL reflex; Future    Testicular hypogonadism  -     Testosterone, free, total; Future        ADHD (attention deficit hyperactivity disorder), combined type  Pt will try increasing strattera as recommended at last visit  Testicular hypogonadism  Update labs and adjust treatment accordingly  Subjective:      Patient ID: Penelope Carreon is a 46 y o  male  Pt presents for routine visit  He is doing well overall  Labs and CRC Screening are up to date  BP is well controlled  He admits he never started the increase in strattera but will try this now  He denies any new complaints or concerns  The following portions of the patient's history were reviewed and updated as appropriate:   He has a past medical history of ADD (attention deficit disorder), Anxiety, DDD (degenerative disc disease), lumbar (10/22/2018), Depression, ED (erectile dysfunction), Groin strain, Hyperlipidemia, and Shoulder pain, bilateral ,  does not have any pertinent problems on file  ,   has a past surgical history that includes Elbow surgery; Vasectomy; Nasal septum surgery; Shoulder surgery (Right, 12/04/2019); and FL injection right hip (arthrogram) (12/24/2019)  ,  family history includes No Known Problems in his father and mother  ,   reports that he has never smoked   He has never used smokeless tobacco  He reports current alcohol use  He reports that he does not use drugs  ,  is allergic to adhesive [medical tape]     Current Outpatient Medications   Medication Sig Dispense Refill    atoMOXetine (STRATTERA) 40 mg capsule Take 1 capsule (40 mg total) by mouth 2 (two) times a day 180 capsule 1    clotrimazole-betamethasone (LOTRISONE) 1-0 05 % cream Apply topically 2 (two) times a day 30 g 0    ezetimibe (ZETIA) 10 mg tablet TAKE 1 TABLET BY MOUTH EVERY DAY 90 tablet 1    FLUoxetine (PROzac) 20 mg capsule TAKE 1 CAPSULE BY MOUTH TWICE A  capsule 1    meloxicam (MOBIC) 15 mg tablet Take 1 tablet (15 mg total) by mouth daily as needed for moderate pain 90 tablet 0    rOPINIRole (REQUIP XL) 2 MG 24 hr tablet Take 1 tablet (2 mg total) by mouth daily at bedtime 30 tablet 5    Testosterone 30 MG/ACT SOLN 4 ACT (120 MG TOTAL) BY PUMP ROUTE 4 (FOUR) TIMES A  mL 0    traZODone (DESYREL) 50 mg tablet trazodone 50 mg tablet   TAKE 1 2 TABLETS (50 100 MG TOTAL) BY MOUTH DAILY AT BEDTIME      acyclovir (ZOVIRAX) 200 mg capsule Take 1 capsule (200 mg total) by mouth 5 (five) times a day for 7 days 35 capsule 0     No current facility-administered medications for this visit  Review of Systems   Constitutional: Negative for chills and fever  HENT: Negative for congestion, ear pain, hearing loss, postnasal drip, rhinorrhea, sinus pressure, sinus pain, sore throat and trouble swallowing  Eyes: Negative for pain and visual disturbance  Respiratory: Negative for cough, chest tightness, shortness of breath and wheezing  Cardiovascular: Negative  Negative for chest pain, palpitations and leg swelling  Gastrointestinal: Negative for abdominal pain, blood in stool, constipation, diarrhea, nausea and vomiting  Endocrine: Negative for cold intolerance, heat intolerance, polydipsia, polyphagia and polyuria  Genitourinary: Negative for difficulty urinating, dysuria, flank pain and urgency  Musculoskeletal: Negative for arthralgias, back pain, gait problem and myalgias  Skin: Negative for rash  Allergic/Immunologic: Negative  Neurological: Negative for dizziness, weakness, light-headedness and headaches  Hematological: Negative  Psychiatric/Behavioral: Negative for behavioral problems, dysphoric mood and sleep disturbance  The patient is not nervous/anxious  PHQ-9 Depression Screening    PHQ-9:   Frequency of the following problems over the past two weeks:              Objective:  Vitals:    02/24/21 0812   BP: 130/80   Pulse: 73   Resp: 14   Temp: 97 6 °F (36 4 °C)   TempSrc: Tympanic   SpO2: 98%   Weight: 73 9 kg (163 lb)   Height: 5' 6" (1 676 m)     Body mass index is 26 31 kg/m²  Physical Exam  Constitutional:       General: He is not in acute distress  Appearance: He is well-developed  He is not diaphoretic  HENT:      Head: Normocephalic and atraumatic  Right Ear: External ear normal       Left Ear: External ear normal       Nose: Nose normal       Mouth/Throat:      Pharynx: No oropharyngeal exudate  Eyes:      General: No scleral icterus  Right eye: No discharge  Left eye: No discharge  Conjunctiva/sclera: Conjunctivae normal       Pupils: Pupils are equal, round, and reactive to light  Neck:      Musculoskeletal: Normal range of motion and neck supple  Thyroid: No thyromegaly  Cardiovascular:      Rate and Rhythm: Normal rate and regular rhythm  Heart sounds: Normal heart sounds  No murmur  No friction rub  No gallop  Pulmonary:      Effort: Pulmonary effort is normal  No respiratory distress  Breath sounds: Normal breath sounds  No wheezing or rales  Abdominal:      General: Bowel sounds are normal  There is no distension  Palpations: Abdomen is soft  Tenderness: There is no abdominal tenderness  Musculoskeletal: Normal range of motion  General: No tenderness or deformity     Skin: General: Skin is warm and dry  Neurological:      Mental Status: He is alert and oriented to person, place, and time  Cranial Nerves: No cranial nerve deficit  Psychiatric:         Behavior: Behavior normal          Thought Content: Thought content normal          Judgment: Judgment normal        BMI Counseling: Body mass index is 26 31 kg/m²  The BMI is above normal  Nutrition recommendations include decreasing portion sizes, consuming healthier snacks and limiting drinks that contain sugar

## 2021-03-02 DIAGNOSIS — G89.29 CHRONIC PAIN OF LEFT KNEE: ICD-10-CM

## 2021-03-02 DIAGNOSIS — M25.562 CHRONIC PAIN OF LEFT KNEE: ICD-10-CM

## 2021-03-03 RX ORDER — MELOXICAM 15 MG/1
15 TABLET ORAL DAILY PRN
Qty: 90 TABLET | Refills: 1 | Status: SHIPPED | OUTPATIENT
Start: 2021-03-03 | End: 2021-11-07 | Stop reason: SDUPTHER

## 2021-03-05 DIAGNOSIS — F51.01 PRIMARY INSOMNIA: Primary | ICD-10-CM

## 2021-03-05 RX ORDER — TRAZODONE HYDROCHLORIDE 50 MG/1
50 TABLET ORAL
Qty: 90 TABLET | Refills: 1 | Status: SHIPPED | OUTPATIENT
Start: 2021-03-05 | End: 2022-04-29

## 2021-03-28 DIAGNOSIS — F90.2 ADHD (ATTENTION DEFICIT HYPERACTIVITY DISORDER), COMBINED TYPE: ICD-10-CM

## 2021-03-29 RX ORDER — ATOMOXETINE 40 MG/1
CAPSULE ORAL
Qty: 180 CAPSULE | Refills: 1 | Status: SHIPPED | OUTPATIENT
Start: 2021-03-29 | End: 2021-11-23

## 2021-04-01 DIAGNOSIS — Z23 ENCOUNTER FOR IMMUNIZATION: ICD-10-CM

## 2021-05-18 ENCOUNTER — OFFICE VISIT (OUTPATIENT)
Dept: INTERNAL MEDICINE CLINIC | Facility: CLINIC | Age: 51
End: 2021-05-18
Payer: COMMERCIAL

## 2021-05-18 VITALS
TEMPERATURE: 98.7 F | HEIGHT: 66 IN | RESPIRATION RATE: 14 BRPM | SYSTOLIC BLOOD PRESSURE: 130 MMHG | WEIGHT: 157.4 LBS | DIASTOLIC BLOOD PRESSURE: 78 MMHG | OXYGEN SATURATION: 98 % | BODY MASS INDEX: 25.3 KG/M2 | HEART RATE: 79 BPM

## 2021-05-18 DIAGNOSIS — E78.00 PURE HYPERCHOLESTEROLEMIA: ICD-10-CM

## 2021-05-18 DIAGNOSIS — F90.2 ADHD (ATTENTION DEFICIT HYPERACTIVITY DISORDER), COMBINED TYPE: ICD-10-CM

## 2021-05-18 DIAGNOSIS — F41.9 ANXIETY AND DEPRESSION: ICD-10-CM

## 2021-05-18 DIAGNOSIS — F32.A ANXIETY AND DEPRESSION: ICD-10-CM

## 2021-05-18 DIAGNOSIS — G25.81 RLS (RESTLESS LEGS SYNDROME): ICD-10-CM

## 2021-05-18 DIAGNOSIS — R41.89 BRAIN FOG: Primary | ICD-10-CM

## 2021-05-18 DIAGNOSIS — E29.1 TESTICULAR HYPOGONADISM: ICD-10-CM

## 2021-05-18 DIAGNOSIS — F51.01 PRIMARY INSOMNIA: ICD-10-CM

## 2021-05-18 DIAGNOSIS — R79.89 LOW TESTOSTERONE: ICD-10-CM

## 2021-05-18 DIAGNOSIS — R63.4 WEIGHT LOSS, UNINTENTIONAL: ICD-10-CM

## 2021-05-18 PROCEDURE — 99214 OFFICE O/P EST MOD 30 MIN: CPT | Performed by: NURSE PRACTITIONER

## 2021-05-18 RX ORDER — TESTOSTERONE 30 MG/1.5ML
4 SOLUTION TOPICAL 4 TIMES DAILY
Qty: 540 ML | Refills: 0 | Status: CANCELLED | OUTPATIENT
Start: 2021-05-18

## 2021-05-18 RX ORDER — EZETIMIBE 10 MG/1
10 TABLET ORAL DAILY
Qty: 90 TABLET | Refills: 1 | Status: CANCELLED | OUTPATIENT
Start: 2021-05-18

## 2021-05-18 RX ORDER — DEXTROAMPHETAMINE SACCHARATE, AMPHETAMINE ASPARTATE MONOHYDRATE, DEXTROAMPHETAMINE SULFATE AND AMPHETAMINE SULFATE 5; 5; 5; 5 MG/1; MG/1; MG/1; MG/1
20 CAPSULE, EXTENDED RELEASE ORAL EVERY MORNING
Qty: 30 CAPSULE | Refills: 0 | Status: SHIPPED | OUTPATIENT
Start: 2021-05-18 | End: 2021-06-14 | Stop reason: SDUPTHER

## 2021-05-18 RX ORDER — ATOMOXETINE 40 MG/1
40 CAPSULE ORAL 2 TIMES DAILY
Qty: 180 CAPSULE | Refills: 1 | Status: CANCELLED | OUTPATIENT
Start: 2021-05-18

## 2021-05-18 RX ORDER — ATOMOXETINE 40 MG/1
1 CAPSULE ORAL 2 TIMES DAILY
COMMUNITY
Start: 2021-03-29 | End: 2021-11-23

## 2021-05-18 RX ORDER — ROPINIROLE 2 MG/1
2 TABLET, FILM COATED, EXTENDED RELEASE ORAL
Qty: 30 TABLET | Refills: 5 | Status: CANCELLED | OUTPATIENT
Start: 2021-05-18

## 2021-05-18 NOTE — PROGRESS NOTES
Assessment/Plan:    Problem List Items Addressed This Visit        Endocrine    Testicular hypogonadism     · Continue testosterone replacement   · Check labs         Relevant Orders    Testosterone, free, total    Testosterone, Free, Direct    Testosterone       Other    Pure hypercholesterolemia     · Continue zetia          ADHD (attention deficit hyperactivity disorder), combined type     · Continue strattera          Relevant Medications    atoMOXetine (STRATTERA) 40 mg capsule    amphetamine-dextroamphetamine (ADDERALL XR) 20 MG 24 hr capsule    Anxiety and depression     · Continue prozac         Relevant Medications    atoMOXetine (STRATTERA) 40 mg capsule    amphetamine-dextroamphetamine (ADDERALL XR) 20 MG 24 hr capsule    RLS (restless legs syndrome)     · Continue requip         Primary insomnia     · Continue trazodone          Brain fog - Primary     · Memory issues  · Was told to increase strattera - will discontinue   · Start Adderall 20mg          Relevant Orders    Lyme Antibody Profile with reflex to WB    Iron Panel (Includes Ferritin, Iron Sat%, Iron, and TIBC)    Vitamin D 25 hydroxy    Vitamin B12    Folate    HEMOGLOBIN A1C W/ EAG ESTIMATION    Lipid panel    CBC and differential    Comprehensive metabolic panel    TSH, 3rd generation    T4, free    Vitamin B6    Vitamin A    Weight loss, unintentional     · Will check labs           Other Visit Diagnoses     Low testosterone        Relevant Orders    Testosterone, free, total    Testosterone, Free, Direct    Testosterone         Diagnoses and all orders for this visit:    Brain fog  -     Lyme Antibody Profile with reflex to WB; Future  -     Iron Panel (Includes Ferritin, Iron Sat%, Iron, and TIBC); Future  -     Vitamin D 25 hydroxy; Future  -     Vitamin B12; Future  -     Folate; Future  -     HEMOGLOBIN A1C W/ EAG ESTIMATION; Future  -     Lipid panel;  Future  -     CBC and differential; Future  -     Comprehensive metabolic panel; Future  -     TSH, 3rd generation; Future  -     T4, free; Future  -     Vitamin B6; Future  -     Vitamin A; Future    ADHD (attention deficit hyperactivity disorder), combined type  -     amphetamine-dextroamphetamine (ADDERALL XR) 20 MG 24 hr capsule; Take 1 capsule (20 mg total) by mouth every morningMax Daily Amount: 20 mg    Primary insomnia    Pure hypercholesterolemia    Anxiety and depression    RLS (restless legs syndrome)    Testicular hypogonadism  -     Testosterone, free, total; Future  -     Testosterone, Free, Direct; Future  -     Testosterone; Future    Low testosterone  -     Testosterone, free, total; Future  -     Testosterone, Free, Direct; Future  -     Testosterone; Future    Weight loss, unintentional    Other orders  -     Cancel: ezetimibe (ZETIA) 10 mg tablet; Take 1 tablet (10 mg total) by mouth daily  -     Cancel: atoMOXetine (STRATTERA) 40 mg capsule; Take 1 capsule (40 mg total) by mouth 2 (two) times a day  -     Cancel: rOPINIRole (REQUIP XL) 2 MG 24 hr tablet; Take 1 tablet (2 mg total) by mouth daily at bedtime  -     Cancel: Testosterone 30 MG/ACT SOLN; 4 Act (120 mg total) by Pump route 4 (four) times a day  -     atoMOXetine (STRATTERA) 40 mg capsule; Take 1 capsule by mouth 2 (two) times a day     Brain fog  · Memory issues  · Was told to increase strattera - will discontinue   · Start Adderall 20mg     ADHD (attention deficit hyperactivity disorder), combined type  · Continue strattera     Primary insomnia  · Continue trazodone     Pure hypercholesterolemia  · Continue zetia     Anxiety and depression  · Continue prozac    RLS (restless legs syndrome)  · Continue requip    Testicular hypogonadism  · Continue testosterone replacement   · Check labs    Weight loss, unintentional  · Will check labs      Subjective:      Patient ID: Patti Uriostegui is a 46 y o  male  Patient here to discuss his brain fog and weight loss  He does work outside after working at his job   We will check him for lymes, check his vitamin levels, check his thyroid levels  He also has been on his strattera for a number of years, and will switch him from that to Adderall  He does state that he does forget to eat, for which we did discuss him taking food with him when he works outside  The following portions of the patient's history were reviewed and updated as appropriate:   Past Medical History:  He has a past medical history of DDD (degenerative disc disease), lumbar (10/22/2018), ED (erectile dysfunction), Groin strain, Hyperlipidemia, and Shoulder pain, bilateral ,  _______________________________________________________________________  Medical Problems:  does not have any pertinent problems on file ,  _______________________________________________________________________  Past Surgical History:   has a past surgical history that includes Elbow surgery; Vasectomy; Nasal septum surgery; Shoulder surgery (Right, 12/04/2019); and FL injection right hip (arthrogram) (12/24/2019)  ,  _______________________________________________________________________  Family History:  family history includes No Known Problems in his father and mother ,  _______________________________________________________________________  Social History:   reports that he has never smoked  He has never used smokeless tobacco  He reports current alcohol use  He reports that he does not use drugs  ,  _______________________________________________________________________  Allergies:  is allergic to adhesive [medical tape]     _______________________________________________________________________  Current Outpatient Medications   Medication Sig Dispense Refill    amphetamine-dextroamphetamine (ADDERALL XR) 20 MG 24 hr capsule Take 1 capsule (20 mg total) by mouth every morningMax Daily Amount: 20 mg 30 capsule 0    ezetimibe (ZETIA) 10 mg tablet TAKE 1 TABLET BY MOUTH EVERY DAY 90 tablet 1    FLUoxetine (PROzac) 20 mg capsule TAKE 1 CAPSULE BY MOUTH TWICE A  capsule 1    meloxicam (MOBIC) 15 mg tablet TAKE 1 TABLET (15 MG TOTAL) BY MOUTH DAILY AS NEEDED FOR MODERATE PAIN 90 tablet 1    rOPINIRole (REQUIP XL) 2 MG 24 hr tablet Take 1 tablet (2 mg total) by mouth daily at bedtime 30 tablet 5    Testosterone 30 MG/ACT SOLN 4 ACT (120 MG TOTAL) BY PUMP ROUTE 4 (FOUR) TIMES A  mL 0    traZODone (DESYREL) 50 mg tablet Take 1 tablet (50 mg total) by mouth daily at bedtime 90 tablet 1    acyclovir (ZOVIRAX) 200 mg capsule Take 1 capsule (200 mg total) by mouth 5 (five) times a day for 7 days 35 capsule 0    atoMOXetine (STRATTERA) 40 mg capsule TAKE 1 CAPSULE BY MOUTH TWICE A DAY (Patient taking differently: 40 mg Once daily) 180 capsule 1    atoMOXetine (STRATTERA) 40 mg capsule Take 1 capsule by mouth 2 (two) times a day      clotrimazole-betamethasone (LOTRISONE) 1-0 05 % cream Apply topically 2 (two) times a day (Patient not taking: Reported on 5/18/2021) 30 g 0     No current facility-administered medications for this visit       _______________________________________________________________________  Review of Systems   Constitutional: Negative for activity change, chills, fatigue and fever  HENT: Negative for rhinorrhea and sore throat  Eyes: Negative for pain  Respiratory: Negative for cough and shortness of breath  Cardiovascular: Negative for chest pain, palpitations and leg swelling  Gastrointestinal: Negative for abdominal pain, constipation, diarrhea, nausea and vomiting  Genitourinary: Negative for difficulty urinating, flank pain, frequency and urgency  Musculoskeletal: Negative for gait problem, joint swelling and myalgias  Skin: Negative for color change  Neurological: Negative for dizziness, weakness, light-headedness and headaches  Psychiatric/Behavioral: Negative for sleep disturbance  The patient is not nervous/anxious  All other systems reviewed and are negative  Objective:  Vitals:    05/18/21 1250   BP: 130/78   BP Location: Left arm   Patient Position: Sitting   Cuff Size: Standard   Pulse: 79   Resp: 14   Temp: 98 7 °F (37 1 °C)   SpO2: 98%   Weight: 71 4 kg (157 lb 6 4 oz)   Height: 5' 6" (1 676 m)     Body mass index is 25 41 kg/m²  Physical Exam  Vitals signs reviewed  Constitutional:       General: He is awake  Appearance: Normal appearance  He is well-developed and normal weight  HENT:      Head: Normocephalic and atraumatic  Nose: Nose normal       Mouth/Throat:      Mouth: Mucous membranes are moist    Eyes:      Extraocular Movements: Extraocular movements intact  Neck:      Musculoskeletal: Normal range of motion  Cardiovascular:      Rate and Rhythm: Normal rate and regular rhythm  Pulses: Normal pulses  Heart sounds: Normal heart sounds  Pulmonary:      Effort: Pulmonary effort is normal       Breath sounds: Normal breath sounds  Abdominal:      General: Bowel sounds are normal       Palpations: Abdomen is soft  Musculoskeletal: Normal range of motion  Right lower leg: No edema  Left lower leg: No edema  Skin:     General: Skin is warm and dry  Neurological:      Mental Status: He is alert and oriented to person, place, and time  Psychiatric:         Attention and Perception: Attention normal          Mood and Affect: Mood normal          Speech: Speech normal          Behavior: Behavior normal  Behavior is cooperative             PHQ-9 Depression Screening    PHQ-9:   Frequency of the following problems over the past two weeks:

## 2021-05-20 LAB — HBA1C MFR BLD HPLC: 4.9 %

## 2021-06-14 DIAGNOSIS — F90.2 ADHD (ATTENTION DEFICIT HYPERACTIVITY DISORDER), COMBINED TYPE: ICD-10-CM

## 2021-06-14 DIAGNOSIS — E78.00 PURE HYPERCHOLESTEROLEMIA: ICD-10-CM

## 2021-06-14 DIAGNOSIS — G25.81 RLS (RESTLESS LEGS SYNDROME): ICD-10-CM

## 2021-06-14 RX ORDER — EZETIMIBE 10 MG/1
10 TABLET ORAL DAILY
Qty: 90 TABLET | Refills: 1 | Status: SHIPPED | OUTPATIENT
Start: 2021-06-14 | End: 2022-01-09 | Stop reason: SDUPTHER

## 2021-06-14 RX ORDER — ROPINIROLE 2 MG/1
2 TABLET, FILM COATED, EXTENDED RELEASE ORAL
Qty: 30 TABLET | Refills: 5 | Status: SHIPPED | OUTPATIENT
Start: 2021-06-14 | End: 2021-11-18

## 2021-06-14 RX ORDER — DEXTROAMPHETAMINE SACCHARATE, AMPHETAMINE ASPARTATE MONOHYDRATE, DEXTROAMPHETAMINE SULFATE AND AMPHETAMINE SULFATE 5; 5; 5; 5 MG/1; MG/1; MG/1; MG/1
20 CAPSULE, EXTENDED RELEASE ORAL EVERY MORNING
Qty: 30 CAPSULE | Refills: 0 | Status: SHIPPED | OUTPATIENT
Start: 2021-06-14 | End: 2021-07-12 | Stop reason: SDUPTHER

## 2021-06-14 NOTE — TELEPHONE ENCOUNTER
Pt states his blood work is good and just wants to confirm? I looked at his labs and they all still say active  Maybe he got it done with [facility]? ?

## 2021-07-12 DIAGNOSIS — F90.2 ADHD (ATTENTION DEFICIT HYPERACTIVITY DISORDER), COMBINED TYPE: ICD-10-CM

## 2021-07-12 RX ORDER — DEXTROAMPHETAMINE SACCHARATE, AMPHETAMINE ASPARTATE MONOHYDRATE, DEXTROAMPHETAMINE SULFATE AND AMPHETAMINE SULFATE 5; 5; 5; 5 MG/1; MG/1; MG/1; MG/1
20 CAPSULE, EXTENDED RELEASE ORAL EVERY MORNING
Qty: 30 CAPSULE | Refills: 0 | Status: SHIPPED | OUTPATIENT
Start: 2021-07-12 | End: 2021-08-08 | Stop reason: SDUPTHER

## 2021-08-08 DIAGNOSIS — F90.2 ADHD (ATTENTION DEFICIT HYPERACTIVITY DISORDER), COMBINED TYPE: ICD-10-CM

## 2021-08-09 RX ORDER — DEXTROAMPHETAMINE SACCHARATE, AMPHETAMINE ASPARTATE MONOHYDRATE, DEXTROAMPHETAMINE SULFATE AND AMPHETAMINE SULFATE 5; 5; 5; 5 MG/1; MG/1; MG/1; MG/1
20 CAPSULE, EXTENDED RELEASE ORAL EVERY MORNING
Qty: 30 CAPSULE | Refills: 0 | Status: SHIPPED | OUTPATIENT
Start: 2021-08-09 | End: 2021-09-13 | Stop reason: SDUPTHER

## 2021-08-11 DIAGNOSIS — B00.9 HERPES SIMPLEX: ICD-10-CM

## 2021-08-11 RX ORDER — ACYCLOVIR 200 MG/1
200 CAPSULE ORAL
Qty: 35 CAPSULE | Refills: 0 | Status: SHIPPED | OUTPATIENT
Start: 2021-08-11 | End: 2021-12-08 | Stop reason: SDUPTHER

## 2021-09-13 DIAGNOSIS — F90.2 ADHD (ATTENTION DEFICIT HYPERACTIVITY DISORDER), COMBINED TYPE: ICD-10-CM

## 2021-09-13 RX ORDER — DEXTROAMPHETAMINE SACCHARATE, AMPHETAMINE ASPARTATE MONOHYDRATE, DEXTROAMPHETAMINE SULFATE AND AMPHETAMINE SULFATE 5; 5; 5; 5 MG/1; MG/1; MG/1; MG/1
20 CAPSULE, EXTENDED RELEASE ORAL EVERY MORNING
Qty: 30 CAPSULE | Refills: 0 | Status: SHIPPED | OUTPATIENT
Start: 2021-09-13 | End: 2021-10-11 | Stop reason: SDUPTHER

## 2021-09-22 DIAGNOSIS — R79.89 LOW TESTOSTERONE: ICD-10-CM

## 2021-09-22 RX ORDER — TESTOSTERONE 30 MG/1.5ML
4 SOLUTION TOPICAL 4 TIMES DAILY
Qty: 540 ML | Refills: 0 | Status: SHIPPED | OUTPATIENT
Start: 2021-09-22 | End: 2021-09-23

## 2021-09-23 ENCOUNTER — TELEPHONE (OUTPATIENT)
Dept: INTERNAL MEDICINE CLINIC | Facility: CLINIC | Age: 51
End: 2021-09-23

## 2021-09-23 NOTE — TELEPHONE ENCOUNTER
Pharmacy called for clarification on testosterone that was prescribed  Can you please review And advise  They are asking for call back

## 2021-09-27 ENCOUNTER — TELEPHONE (OUTPATIENT)
Dept: INTERNAL MEDICINE CLINIC | Facility: CLINIC | Age: 51
End: 2021-09-27

## 2021-09-27 DIAGNOSIS — E29.1 TESTICULAR HYPOGONADISM: Primary | ICD-10-CM

## 2021-09-27 RX ORDER — TESTOSTERONE 16.2 MG/G
GEL TRANSDERMAL
Qty: 75 G | Refills: 2 | Status: SHIPPED | OUTPATIENT
Start: 2021-09-27 | End: 2021-11-24 | Stop reason: SDUPTHER

## 2021-09-28 ENCOUNTER — TELEPHONE (OUTPATIENT)
Dept: INTERNAL MEDICINE CLINIC | Facility: CLINIC | Age: 51
End: 2021-09-28

## 2021-10-11 DIAGNOSIS — F90.2 ADHD (ATTENTION DEFICIT HYPERACTIVITY DISORDER), COMBINED TYPE: ICD-10-CM

## 2021-10-11 RX ORDER — DEXTROAMPHETAMINE SACCHARATE, AMPHETAMINE ASPARTATE MONOHYDRATE, DEXTROAMPHETAMINE SULFATE AND AMPHETAMINE SULFATE 5; 5; 5; 5 MG/1; MG/1; MG/1; MG/1
20 CAPSULE, EXTENDED RELEASE ORAL EVERY MORNING
Qty: 30 CAPSULE | Refills: 0 | Status: SHIPPED | OUTPATIENT
Start: 2021-10-11 | End: 2021-11-23 | Stop reason: SDUPTHER

## 2021-10-15 ENCOUNTER — OFFICE VISIT (OUTPATIENT)
Dept: INTERNAL MEDICINE CLINIC | Facility: CLINIC | Age: 51
End: 2021-10-15
Payer: COMMERCIAL

## 2021-10-15 VITALS
BODY MASS INDEX: 25.94 KG/M2 | HEIGHT: 66 IN | SYSTOLIC BLOOD PRESSURE: 140 MMHG | HEART RATE: 73 BPM | WEIGHT: 161.38 LBS | DIASTOLIC BLOOD PRESSURE: 96 MMHG | TEMPERATURE: 97.2 F | OXYGEN SATURATION: 99 %

## 2021-10-15 DIAGNOSIS — E29.1 TESTICULAR HYPOGONADISM: ICD-10-CM

## 2021-10-15 DIAGNOSIS — Z23 NEED FOR INFLUENZA VACCINATION: ICD-10-CM

## 2021-10-15 DIAGNOSIS — F90.2 ADHD (ATTENTION DEFICIT HYPERACTIVITY DISORDER), COMBINED TYPE: Primary | ICD-10-CM

## 2021-10-15 DIAGNOSIS — N52.9 ERECTILE DYSFUNCTION, UNSPECIFIED ERECTILE DYSFUNCTION TYPE: ICD-10-CM

## 2021-10-15 PROCEDURE — 90686 IIV4 VACC NO PRSV 0.5 ML IM: CPT | Performed by: PHYSICIAN ASSISTANT

## 2021-10-15 PROCEDURE — 3008F BODY MASS INDEX DOCD: CPT | Performed by: PHYSICIAN ASSISTANT

## 2021-10-15 PROCEDURE — 90471 IMMUNIZATION ADMIN: CPT | Performed by: PHYSICIAN ASSISTANT

## 2021-10-15 PROCEDURE — 99214 OFFICE O/P EST MOD 30 MIN: CPT | Performed by: PHYSICIAN ASSISTANT

## 2021-10-15 PROCEDURE — 1036F TOBACCO NON-USER: CPT | Performed by: PHYSICIAN ASSISTANT

## 2021-10-15 RX ORDER — DEXTROAMPHETAMINE SACCHARATE, AMPHETAMINE ASPARTATE MONOHYDRATE, DEXTROAMPHETAMINE SULFATE AND AMPHETAMINE SULFATE 2.5; 2.5; 2.5; 2.5 MG/1; MG/1; MG/1; MG/1
10 CAPSULE, EXTENDED RELEASE ORAL EVERY MORNING
Qty: 30 CAPSULE | Refills: 0 | Status: SHIPPED | OUTPATIENT
Start: 2021-10-15 | End: 2021-11-23 | Stop reason: SDUPTHER

## 2021-10-15 RX ORDER — SILDENAFIL 100 MG/1
100 TABLET, FILM COATED ORAL DAILY PRN
Qty: 10 TABLET | Refills: 0 | Status: SHIPPED | OUTPATIENT
Start: 2021-10-15

## 2021-11-07 DIAGNOSIS — G89.29 CHRONIC PAIN OF LEFT KNEE: ICD-10-CM

## 2021-11-07 DIAGNOSIS — M25.562 CHRONIC PAIN OF LEFT KNEE: ICD-10-CM

## 2021-11-08 RX ORDER — MELOXICAM 15 MG/1
15 TABLET ORAL DAILY PRN
Qty: 90 TABLET | Refills: 0 | Status: SHIPPED | OUTPATIENT
Start: 2021-11-08 | End: 2022-02-20 | Stop reason: SDUPTHER

## 2021-11-12 DIAGNOSIS — G25.81 RLS (RESTLESS LEGS SYNDROME): Primary | ICD-10-CM

## 2021-11-12 RX ORDER — GABAPENTIN 300 MG/1
300 CAPSULE ORAL
Qty: 90 CAPSULE | Refills: 1 | Status: SHIPPED | OUTPATIENT
Start: 2021-11-12 | End: 2021-11-29

## 2021-11-18 DIAGNOSIS — F41.9 ANXIETY AND DEPRESSION: Primary | ICD-10-CM

## 2021-11-18 DIAGNOSIS — F32.A ANXIETY AND DEPRESSION: Primary | ICD-10-CM

## 2021-11-18 RX ORDER — BUPROPION HYDROCHLORIDE 150 MG/1
150 TABLET ORAL EVERY MORNING
Qty: 30 TABLET | Refills: 5 | Status: SHIPPED | OUTPATIENT
Start: 2021-11-18 | End: 2022-02-27 | Stop reason: SDUPTHER

## 2021-11-23 DIAGNOSIS — F90.2 ADHD (ATTENTION DEFICIT HYPERACTIVITY DISORDER), COMBINED TYPE: ICD-10-CM

## 2021-11-23 RX ORDER — DEXTROAMPHETAMINE SACCHARATE, AMPHETAMINE ASPARTATE MONOHYDRATE, DEXTROAMPHETAMINE SULFATE AND AMPHETAMINE SULFATE 5; 5; 5; 5 MG/1; MG/1; MG/1; MG/1
20 CAPSULE, EXTENDED RELEASE ORAL EVERY MORNING
Qty: 30 CAPSULE | Refills: 0 | Status: SHIPPED | OUTPATIENT
Start: 2021-11-23 | End: 2021-12-19 | Stop reason: SDUPTHER

## 2021-11-23 RX ORDER — DEXTROAMPHETAMINE SACCHARATE, AMPHETAMINE ASPARTATE MONOHYDRATE, DEXTROAMPHETAMINE SULFATE AND AMPHETAMINE SULFATE 2.5; 2.5; 2.5; 2.5 MG/1; MG/1; MG/1; MG/1
10 CAPSULE, EXTENDED RELEASE ORAL EVERY MORNING
Qty: 30 CAPSULE | Refills: 0 | Status: SHIPPED | OUTPATIENT
Start: 2021-11-23 | End: 2021-12-19 | Stop reason: SDUPTHER

## 2021-11-24 DIAGNOSIS — E29.1 TESTICULAR HYPOGONADISM: ICD-10-CM

## 2021-11-24 RX ORDER — TESTOSTERONE 16.2 MG/G
GEL TRANSDERMAL
Qty: 75 G | Refills: 2 | Status: SHIPPED | OUTPATIENT
Start: 2021-11-24 | End: 2022-03-06 | Stop reason: SDUPTHER

## 2021-11-29 ENCOUNTER — TELEPHONE (OUTPATIENT)
Dept: INTERNAL MEDICINE CLINIC | Facility: CLINIC | Age: 51
End: 2021-11-29

## 2021-11-29 DIAGNOSIS — G25.81 RLS (RESTLESS LEGS SYNDROME): ICD-10-CM

## 2021-11-29 RX ORDER — ROPINIROLE 2 MG/1
2 TABLET, FILM COATED, EXTENDED RELEASE ORAL
Qty: 30 TABLET | Refills: 3 | Status: SHIPPED | OUTPATIENT
Start: 2021-11-29 | End: 2021-12-10 | Stop reason: SDUPTHER

## 2021-12-03 ENCOUNTER — TELEMEDICINE (OUTPATIENT)
Dept: INTERNAL MEDICINE CLINIC | Facility: CLINIC | Age: 51
End: 2021-12-03
Payer: COMMERCIAL

## 2021-12-03 DIAGNOSIS — R50.9 FEVER, UNSPECIFIED FEVER CAUSE: ICD-10-CM

## 2021-12-03 DIAGNOSIS — R22.1 NECK SWELLING: Primary | ICD-10-CM

## 2021-12-03 DIAGNOSIS — R63.4 WEIGHT LOSS: ICD-10-CM

## 2021-12-03 PROCEDURE — 1036F TOBACCO NON-USER: CPT | Performed by: PHYSICIAN ASSISTANT

## 2021-12-03 PROCEDURE — 99213 OFFICE O/P EST LOW 20 MIN: CPT | Performed by: PHYSICIAN ASSISTANT

## 2021-12-03 RX ORDER — FLUOXETINE 10 MG/1
CAPSULE ORAL
COMMUNITY

## 2021-12-05 PROBLEM — R50.9 FEVER: Status: ACTIVE | Noted: 2021-12-05

## 2021-12-05 PROBLEM — R22.1 NECK SWELLING: Status: ACTIVE | Noted: 2021-12-05

## 2021-12-08 DIAGNOSIS — B00.9 HERPES SIMPLEX: ICD-10-CM

## 2021-12-09 RX ORDER — ACYCLOVIR 200 MG/1
200 CAPSULE ORAL
Qty: 35 CAPSULE | Refills: 0 | Status: SHIPPED | OUTPATIENT
Start: 2021-12-09 | End: 2021-12-16

## 2021-12-10 DIAGNOSIS — G25.81 RLS (RESTLESS LEGS SYNDROME): ICD-10-CM

## 2021-12-10 RX ORDER — ROPINIROLE 4 MG/1
4 TABLET, FILM COATED, EXTENDED RELEASE ORAL
Qty: 90 TABLET | Refills: 1 | Status: SHIPPED | OUTPATIENT
Start: 2021-12-10 | End: 2022-05-21 | Stop reason: SDUPTHER

## 2021-12-19 DIAGNOSIS — F90.2 ADHD (ATTENTION DEFICIT HYPERACTIVITY DISORDER), COMBINED TYPE: ICD-10-CM

## 2021-12-20 RX ORDER — DEXTROAMPHETAMINE SACCHARATE, AMPHETAMINE ASPARTATE MONOHYDRATE, DEXTROAMPHETAMINE SULFATE AND AMPHETAMINE SULFATE 5; 5; 5; 5 MG/1; MG/1; MG/1; MG/1
20 CAPSULE, EXTENDED RELEASE ORAL EVERY MORNING
Qty: 30 CAPSULE | Refills: 0 | Status: SHIPPED | OUTPATIENT
Start: 2021-12-20 | End: 2022-01-15 | Stop reason: SDUPTHER

## 2021-12-20 RX ORDER — DEXTROAMPHETAMINE SACCHARATE, AMPHETAMINE ASPARTATE MONOHYDRATE, DEXTROAMPHETAMINE SULFATE AND AMPHETAMINE SULFATE 2.5; 2.5; 2.5; 2.5 MG/1; MG/1; MG/1; MG/1
10 CAPSULE, EXTENDED RELEASE ORAL EVERY MORNING
Qty: 30 CAPSULE | Refills: 0 | Status: SHIPPED | OUTPATIENT
Start: 2021-12-20 | End: 2022-01-23 | Stop reason: SDUPTHER

## 2021-12-21 ENCOUNTER — TELEPHONE (OUTPATIENT)
Dept: INTERNAL MEDICINE CLINIC | Facility: CLINIC | Age: 51
End: 2021-12-21

## 2021-12-21 DIAGNOSIS — R22.1 NECK SWELLING: Primary | ICD-10-CM

## 2022-01-09 DIAGNOSIS — E78.00 PURE HYPERCHOLESTEROLEMIA: ICD-10-CM

## 2022-01-10 RX ORDER — EZETIMIBE 10 MG/1
10 TABLET ORAL DAILY
Qty: 90 TABLET | Refills: 0 | Status: SHIPPED | OUTPATIENT
Start: 2022-01-10 | End: 2022-04-21 | Stop reason: SDUPTHER

## 2022-01-15 DIAGNOSIS — F90.2 ADHD (ATTENTION DEFICIT HYPERACTIVITY DISORDER), COMBINED TYPE: ICD-10-CM

## 2022-01-17 RX ORDER — DEXTROAMPHETAMINE SACCHARATE, AMPHETAMINE ASPARTATE MONOHYDRATE, DEXTROAMPHETAMINE SULFATE AND AMPHETAMINE SULFATE 5; 5; 5; 5 MG/1; MG/1; MG/1; MG/1
20 CAPSULE, EXTENDED RELEASE ORAL EVERY MORNING
Qty: 30 CAPSULE | Refills: 0 | Status: SHIPPED | OUTPATIENT
Start: 2022-01-17 | End: 2022-02-13 | Stop reason: SDUPTHER

## 2022-01-23 DIAGNOSIS — F90.2 ADHD (ATTENTION DEFICIT HYPERACTIVITY DISORDER), COMBINED TYPE: ICD-10-CM

## 2022-01-25 RX ORDER — DEXTROAMPHETAMINE SACCHARATE, AMPHETAMINE ASPARTATE MONOHYDRATE, DEXTROAMPHETAMINE SULFATE AND AMPHETAMINE SULFATE 2.5; 2.5; 2.5; 2.5 MG/1; MG/1; MG/1; MG/1
10 CAPSULE, EXTENDED RELEASE ORAL EVERY MORNING
Qty: 30 CAPSULE | Refills: 0 | Status: SHIPPED | OUTPATIENT
Start: 2022-01-25 | End: 2022-02-20 | Stop reason: SDUPTHER

## 2022-02-13 DIAGNOSIS — F90.2 ADHD (ATTENTION DEFICIT HYPERACTIVITY DISORDER), COMBINED TYPE: ICD-10-CM

## 2022-02-16 RX ORDER — DEXTROAMPHETAMINE SACCHARATE, AMPHETAMINE ASPARTATE MONOHYDRATE, DEXTROAMPHETAMINE SULFATE AND AMPHETAMINE SULFATE 5; 5; 5; 5 MG/1; MG/1; MG/1; MG/1
20 CAPSULE, EXTENDED RELEASE ORAL EVERY MORNING
Qty: 30 CAPSULE | Refills: 0 | Status: SHIPPED | OUTPATIENT
Start: 2022-02-16 | End: 2022-02-20 | Stop reason: SDUPTHER

## 2022-02-16 NOTE — TELEPHONE ENCOUNTER
Requested medication(s) are due for refill today: Yes  Patient has already received a courtesy refill: NO  Other reason request has been forwarded to provider:

## 2022-02-20 DIAGNOSIS — F90.2 ADHD (ATTENTION DEFICIT HYPERACTIVITY DISORDER), COMBINED TYPE: ICD-10-CM

## 2022-02-20 DIAGNOSIS — G89.29 CHRONIC PAIN OF LEFT KNEE: ICD-10-CM

## 2022-02-20 DIAGNOSIS — M25.562 CHRONIC PAIN OF LEFT KNEE: ICD-10-CM

## 2022-02-21 RX ORDER — MELOXICAM 15 MG/1
15 TABLET ORAL DAILY PRN
Qty: 90 TABLET | Refills: 0 | Status: SHIPPED | OUTPATIENT
Start: 2022-02-21 | End: 2022-06-12 | Stop reason: SDUPTHER

## 2022-02-21 RX ORDER — DEXTROAMPHETAMINE SACCHARATE, AMPHETAMINE ASPARTATE MONOHYDRATE, DEXTROAMPHETAMINE SULFATE AND AMPHETAMINE SULFATE 5; 5; 5; 5 MG/1; MG/1; MG/1; MG/1
20 CAPSULE, EXTENDED RELEASE ORAL EVERY MORNING
Qty: 30 CAPSULE | Refills: 0 | Status: SHIPPED | OUTPATIENT
Start: 2022-02-21 | End: 2022-03-20 | Stop reason: SDUPTHER

## 2022-02-21 RX ORDER — DEXTROAMPHETAMINE SACCHARATE, AMPHETAMINE ASPARTATE MONOHYDRATE, DEXTROAMPHETAMINE SULFATE AND AMPHETAMINE SULFATE 2.5; 2.5; 2.5; 2.5 MG/1; MG/1; MG/1; MG/1
10 CAPSULE, EXTENDED RELEASE ORAL EVERY MORNING
Qty: 30 CAPSULE | Refills: 0 | Status: SHIPPED | OUTPATIENT
Start: 2022-02-21 | End: 2022-03-20 | Stop reason: SDUPTHER

## 2022-02-27 DIAGNOSIS — F32.A ANXIETY AND DEPRESSION: ICD-10-CM

## 2022-02-27 DIAGNOSIS — M25.562 CHRONIC PAIN OF LEFT KNEE: ICD-10-CM

## 2022-02-27 DIAGNOSIS — F41.9 ANXIETY AND DEPRESSION: ICD-10-CM

## 2022-02-27 DIAGNOSIS — G89.29 CHRONIC PAIN OF LEFT KNEE: ICD-10-CM

## 2022-02-28 RX ORDER — MELOXICAM 15 MG/1
15 TABLET ORAL DAILY PRN
Qty: 90 TABLET | Refills: 0 | OUTPATIENT
Start: 2022-02-28

## 2022-02-28 RX ORDER — BUPROPION HYDROCHLORIDE 150 MG/1
150 TABLET ORAL EVERY MORNING
Qty: 30 TABLET | Refills: 2 | Status: SHIPPED | OUTPATIENT
Start: 2022-02-28 | End: 2022-04-17 | Stop reason: SDUPTHER

## 2022-03-06 DIAGNOSIS — E29.1 TESTICULAR HYPOGONADISM: ICD-10-CM

## 2022-03-07 RX ORDER — TESTOSTERONE 16.2 MG/G
GEL TRANSDERMAL
Qty: 75 G | Refills: 0 | Status: SHIPPED | OUTPATIENT
Start: 2022-03-07 | End: 2022-04-17 | Stop reason: SDUPTHER

## 2022-03-20 DIAGNOSIS — F90.2 ADHD (ATTENTION DEFICIT HYPERACTIVITY DISORDER), COMBINED TYPE: ICD-10-CM

## 2022-03-21 RX ORDER — DEXTROAMPHETAMINE SACCHARATE, AMPHETAMINE ASPARTATE MONOHYDRATE, DEXTROAMPHETAMINE SULFATE AND AMPHETAMINE SULFATE 5; 5; 5; 5 MG/1; MG/1; MG/1; MG/1
20 CAPSULE, EXTENDED RELEASE ORAL EVERY MORNING
Qty: 30 CAPSULE | Refills: 0 | Status: SHIPPED | OUTPATIENT
Start: 2022-03-21 | End: 2022-04-17 | Stop reason: SDUPTHER

## 2022-03-21 RX ORDER — DEXTROAMPHETAMINE SACCHARATE, AMPHETAMINE ASPARTATE MONOHYDRATE, DEXTROAMPHETAMINE SULFATE AND AMPHETAMINE SULFATE 2.5; 2.5; 2.5; 2.5 MG/1; MG/1; MG/1; MG/1
10 CAPSULE, EXTENDED RELEASE ORAL EVERY MORNING
Qty: 30 CAPSULE | Refills: 0 | Status: SHIPPED | OUTPATIENT
Start: 2022-03-21 | End: 2022-04-17 | Stop reason: SDUPTHER

## 2022-03-29 DIAGNOSIS — Z13.29 SCREENING FOR THYROID DISORDER: ICD-10-CM

## 2022-03-29 DIAGNOSIS — E55.9 VITAMIN D DEFICIENCY: ICD-10-CM

## 2022-03-29 DIAGNOSIS — Z13.0 SCREENING FOR DEFICIENCY ANEMIA: ICD-10-CM

## 2022-03-29 DIAGNOSIS — Z13.1 SCREENING FOR DIABETES MELLITUS: ICD-10-CM

## 2022-03-29 DIAGNOSIS — Z12.5 SCREENING PSA (PROSTATE SPECIFIC ANTIGEN): ICD-10-CM

## 2022-03-29 DIAGNOSIS — E78.00 PURE HYPERCHOLESTEROLEMIA: ICD-10-CM

## 2022-03-29 DIAGNOSIS — E29.1 TESTICULAR HYPOGONADISM: Primary | ICD-10-CM

## 2022-04-17 DIAGNOSIS — F41.9 ANXIETY AND DEPRESSION: ICD-10-CM

## 2022-04-17 DIAGNOSIS — F90.2 ADHD (ATTENTION DEFICIT HYPERACTIVITY DISORDER), COMBINED TYPE: ICD-10-CM

## 2022-04-17 DIAGNOSIS — F32.A ANXIETY AND DEPRESSION: ICD-10-CM

## 2022-04-17 DIAGNOSIS — E29.1 TESTICULAR HYPOGONADISM: ICD-10-CM

## 2022-04-18 RX ORDER — DEXTROAMPHETAMINE SACCHARATE, AMPHETAMINE ASPARTATE MONOHYDRATE, DEXTROAMPHETAMINE SULFATE AND AMPHETAMINE SULFATE 5; 5; 5; 5 MG/1; MG/1; MG/1; MG/1
20 CAPSULE, EXTENDED RELEASE ORAL EVERY MORNING
Qty: 30 CAPSULE | Refills: 0 | Status: SHIPPED | OUTPATIENT
Start: 2022-04-18

## 2022-04-18 RX ORDER — BUPROPION HYDROCHLORIDE 150 MG/1
150 TABLET ORAL EVERY MORNING
Qty: 30 TABLET | Refills: 0 | Status: SHIPPED | OUTPATIENT
Start: 2022-04-18 | End: 2022-05-15 | Stop reason: SDUPTHER

## 2022-04-18 RX ORDER — DEXTROAMPHETAMINE SACCHARATE, AMPHETAMINE ASPARTATE MONOHYDRATE, DEXTROAMPHETAMINE SULFATE AND AMPHETAMINE SULFATE 2.5; 2.5; 2.5; 2.5 MG/1; MG/1; MG/1; MG/1
10 CAPSULE, EXTENDED RELEASE ORAL EVERY MORNING
Qty: 30 CAPSULE | Refills: 0 | Status: SHIPPED | OUTPATIENT
Start: 2022-04-18 | End: 2022-04-29

## 2022-04-18 RX ORDER — TESTOSTERONE 16.2 MG/G
GEL TRANSDERMAL
Qty: 75 G | Refills: 0 | Status: SHIPPED | OUTPATIENT
Start: 2022-04-18 | End: 2022-04-29 | Stop reason: SDUPTHER

## 2022-04-21 DIAGNOSIS — E78.00 PURE HYPERCHOLESTEROLEMIA: ICD-10-CM

## 2022-04-22 RX ORDER — EZETIMIBE 10 MG/1
10 TABLET ORAL DAILY
Qty: 90 TABLET | Refills: 0 | Status: SHIPPED | OUTPATIENT
Start: 2022-04-22 | End: 2022-08-07 | Stop reason: SDUPTHER

## 2022-04-29 ENCOUNTER — OFFICE VISIT (OUTPATIENT)
Dept: INTERNAL MEDICINE CLINIC | Facility: CLINIC | Age: 52
End: 2022-04-29
Payer: COMMERCIAL

## 2022-04-29 VITALS
WEIGHT: 168.38 LBS | SYSTOLIC BLOOD PRESSURE: 126 MMHG | HEIGHT: 66 IN | TEMPERATURE: 97.6 F | DIASTOLIC BLOOD PRESSURE: 76 MMHG | HEART RATE: 68 BPM | OXYGEN SATURATION: 99 % | BODY MASS INDEX: 27.06 KG/M2

## 2022-04-29 DIAGNOSIS — E29.1 TESTICULAR HYPOGONADISM: Primary | ICD-10-CM

## 2022-04-29 DIAGNOSIS — F90.2 ADHD (ATTENTION DEFICIT HYPERACTIVITY DISORDER), COMBINED TYPE: ICD-10-CM

## 2022-04-29 PROBLEM — B36.0 TINEA VERSICOLOR: Status: RESOLVED | Noted: 2020-10-20 | Resolved: 2022-04-29

## 2022-04-29 PROBLEM — R50.9 FEVER: Status: RESOLVED | Noted: 2021-12-05 | Resolved: 2022-04-29

## 2022-04-29 PROBLEM — R41.89 BRAIN FOG: Status: RESOLVED | Noted: 2021-05-18 | Resolved: 2022-04-29

## 2022-04-29 PROBLEM — R63.4 WEIGHT LOSS: Status: RESOLVED | Noted: 2021-05-18 | Resolved: 2022-04-29

## 2022-04-29 PROBLEM — R22.1 NECK SWELLING: Status: RESOLVED | Noted: 2021-12-05 | Resolved: 2022-04-29

## 2022-04-29 PROCEDURE — 99214 OFFICE O/P EST MOD 30 MIN: CPT | Performed by: PHYSICIAN ASSISTANT

## 2022-04-29 RX ORDER — TESTOSTERONE 16.2 MG/G
GEL TRANSDERMAL
Qty: 75 G | Refills: 0 | Status: SHIPPED | OUTPATIENT
Start: 2022-04-29 | End: 2022-06-12 | Stop reason: SDUPTHER

## 2022-04-29 RX ORDER — DEXTROAMPHETAMINE SACCHARATE, AMPHETAMINE ASPARTATE MONOHYDRATE, DEXTROAMPHETAMINE SULFATE AND AMPHETAMINE SULFATE 7.5; 7.5; 7.5; 7.5 MG/1; MG/1; MG/1; MG/1
30 CAPSULE, EXTENDED RELEASE ORAL EVERY MORNING
Qty: 30 CAPSULE | Refills: 0 | Status: SHIPPED | OUTPATIENT
Start: 2022-04-29 | End: 2022-06-12 | Stop reason: SDUPTHER

## 2022-04-29 NOTE — PROGRESS NOTES
Assessment/Plan:  Problem List Items Addressed This Visit        Endocrine    Testicular hypogonadism     Will increase androgel to 3 pumps daily         Relevant Medications    testosterone (ANDROGEL) 1 62 % TD gel pump       Other    ADHD (attention deficit hyperactivity disorder), combined type     Pts symptoms are stable with current regime  No changes at present  Relevant Medications    amphetamine-dextroamphetamine (ADDERALL XR, 30MG,) 30 MG 24 hr capsule           Diagnoses and all orders for this visit:    ADHD (attention deficit hyperactivity disorder), combined type  -     amphetamine-dextroamphetamine (ADDERALL XR, 30MG,) 30 MG 24 hr capsule; Take 1 capsule (30 mg total) by mouth every morning Max Daily Amount: 30 mg    Testicular hypogonadism  -     testosterone (ANDROGEL) 1 62 % TD gel pump; Apply 1 pump to one shoulder and 2 pumps to the other shoulder daily        Testicular hypogonadism  Will increase androgel to 3 pumps daily    ADHD (attention deficit hyperactivity disorder), combined type  Pts symptoms are stable with current regime  No changes at present  Subjective:      Patient ID: Simona Dickey is a 46 y o  male  Pt presents for routine visit  He is doing well overall  He is up to date with labs and CRC screening  Labs revealed barely normal testosterone  Will increase dose of his replacement to 3 pumps daily  He continues with polyarthralgias and will be following with his rheumatologist  Christiana Ash seems less effective than it had been initially  The following portions of the patient's history were reviewed and updated as appropriate:   He has a past medical history of DDD (degenerative disc disease), lumbar (10/22/2018), ED (erectile dysfunction), Groin strain, Hyperlipidemia, and Shoulder pain, bilateral ,  does not have any pertinent problems on file  ,   has a past surgical history that includes Elbow surgery; Vasectomy; Nasal septum surgery;  Shoulder surgery (Right, 12/04/2019); and FL injection right hip (arthrogram) (12/24/2019)  ,  family history includes No Known Problems in his father and mother  ,   reports that he has never smoked  He has never used smokeless tobacco  He reports current alcohol use  He reports that he does not use drugs  ,  is allergic to adhesive [medical tape]     Current Outpatient Medications   Medication Sig Dispense Refill    amphetamine-dextroamphetamine (ADDERALL XR) 20 MG 24 hr capsule Take 1 capsule (20 mg total) by mouth every morning Max Daily Amount: 20 mg 30 capsule 0    buPROPion (Wellbutrin XL) 150 mg 24 hr tablet Take 1 tablet (150 mg total) by mouth every morning 30 tablet 0    ezetimibe (ZETIA) 10 mg tablet Take 1 tablet (10 mg total) by mouth daily 90 tablet 0    meloxicam (MOBIC) 15 mg tablet Take 1 tablet (15 mg total) by mouth daily as needed for moderate pain 90 tablet 0    rOPINIRole (REQUIP XL) 4 MG 24 hr tablet Take 1 tablet (4 mg total) by mouth daily at bedtime 90 tablet 1    sildenafil (VIAGRA) 100 mg tablet Take 1 tablet (100 mg total) by mouth daily as needed for erectile dysfunction 10 tablet 0    testosterone (ANDROGEL) 1 62 % TD gel pump Apply 1 pump to one shoulder and 2 pumps to the other shoulder daily 75 g 0    acyclovir (ZOVIRAX) 200 mg capsule Take 1 capsule (200 mg total) by mouth 5 (five) times a day for 7 days 35 capsule 0    amphetamine-dextroamphetamine (ADDERALL XR, 30MG,) 30 MG 24 hr capsule Take 1 capsule (30 mg total) by mouth every morning Max Daily Amount: 30 mg 30 capsule 0    FLUoxetine (PROzac) 10 mg capsule fluoxetine 10 mg capsule       No current facility-administered medications for this visit  Review of Systems   Constitutional: Negative for chills and fever  HENT: Negative for congestion, ear pain, hearing loss, postnasal drip, rhinorrhea, sinus pressure, sinus pain, sore throat and trouble swallowing  Eyes: Negative for pain and visual disturbance     Respiratory: Negative for cough, chest tightness, shortness of breath and wheezing  Cardiovascular: Negative  Negative for chest pain, palpitations and leg swelling  Gastrointestinal: Negative for abdominal pain, blood in stool, constipation, diarrhea, nausea and vomiting  Endocrine: Negative for cold intolerance, heat intolerance, polydipsia, polyphagia and polyuria  Genitourinary: Negative for difficulty urinating, dysuria, flank pain and urgency  Musculoskeletal: Positive for arthralgias  Negative for back pain, gait problem and myalgias  Skin: Negative for rash  Allergic/Immunologic: Negative  Neurological: Negative for dizziness, weakness, light-headedness and headaches  Hematological: Negative  Psychiatric/Behavioral: Positive for sleep disturbance  Negative for behavioral problems and dysphoric mood  The patient is not nervous/anxious  PHQ-2/9 Depression Screening            Objective:  Vitals:    04/29/22 0807   BP: 126/76   BP Location: Left arm   Patient Position: Sitting   Pulse: 68   Temp: 97 6 °F (36 4 °C)   SpO2: 99%   Weight: 76 4 kg (168 lb 6 oz)   Height: 5' 6" (1 676 m)     Body mass index is 27 18 kg/m²  Physical Exam  Constitutional:       General: He is not in acute distress  Appearance: He is well-developed  He is not diaphoretic  HENT:      Head: Normocephalic and atraumatic  Right Ear: External ear normal       Left Ear: External ear normal       Nose: Nose normal       Mouth/Throat:      Pharynx: No oropharyngeal exudate  Eyes:      General: No scleral icterus  Right eye: No discharge  Left eye: No discharge  Conjunctiva/sclera: Conjunctivae normal       Pupils: Pupils are equal, round, and reactive to light  Neck:      Thyroid: No thyromegaly  Cardiovascular:      Rate and Rhythm: Normal rate and regular rhythm  Heart sounds: Normal heart sounds  No murmur heard  No friction rub  No gallop      Pulmonary:      Effort: Pulmonary effort is normal  No respiratory distress  Breath sounds: Normal breath sounds  No wheezing or rales  Abdominal:      General: Bowel sounds are normal  There is no distension  Palpations: Abdomen is soft  Tenderness: There is no abdominal tenderness  Musculoskeletal:         General: No deformity  Right hand: Tenderness present  Decreased range of motion  Left hand: Tenderness present  Decreased range of motion  Cervical back: Normal range of motion and neck supple  Skin:     General: Skin is warm and dry  Neurological:      Mental Status: He is alert and oriented to person, place, and time  Cranial Nerves: No cranial nerve deficit  Psychiatric:         Mood and Affect: Mood is not anxious or depressed  Behavior: Behavior normal          Thought Content: Thought content normal          Judgment: Judgment normal        BMI Counseling: Body mass index is 27 18 kg/m²  The BMI is above normal  Nutrition recommendations include decreasing portion sizes, consuming healthier snacks and limiting drinks that contain sugar  Rationale for BMI follow-up plan is due to patient being overweight or obese

## 2022-05-15 DIAGNOSIS — F41.9 ANXIETY AND DEPRESSION: ICD-10-CM

## 2022-05-15 DIAGNOSIS — F32.A ANXIETY AND DEPRESSION: ICD-10-CM

## 2022-05-16 RX ORDER — BUPROPION HYDROCHLORIDE 150 MG/1
150 TABLET ORAL EVERY MORNING
Qty: 30 TABLET | Refills: 5 | Status: SHIPPED | OUTPATIENT
Start: 2022-05-16 | End: 2022-07-17 | Stop reason: SDUPTHER

## 2022-05-21 DIAGNOSIS — G25.81 RLS (RESTLESS LEGS SYNDROME): ICD-10-CM

## 2022-05-21 RX ORDER — ROPINIROLE 4 MG/1
4 TABLET, FILM COATED, EXTENDED RELEASE ORAL
Qty: 90 TABLET | Refills: 0 | Status: SHIPPED | OUTPATIENT
Start: 2022-05-21

## 2022-06-12 DIAGNOSIS — G89.29 CHRONIC PAIN OF LEFT KNEE: ICD-10-CM

## 2022-06-12 DIAGNOSIS — M25.562 CHRONIC PAIN OF LEFT KNEE: ICD-10-CM

## 2022-06-12 DIAGNOSIS — F90.2 ADHD (ATTENTION DEFICIT HYPERACTIVITY DISORDER), COMBINED TYPE: ICD-10-CM

## 2022-06-12 DIAGNOSIS — E29.1 TESTICULAR HYPOGONADISM: ICD-10-CM

## 2022-06-13 RX ORDER — DEXTROAMPHETAMINE SACCHARATE, AMPHETAMINE ASPARTATE MONOHYDRATE, DEXTROAMPHETAMINE SULFATE AND AMPHETAMINE SULFATE 7.5; 7.5; 7.5; 7.5 MG/1; MG/1; MG/1; MG/1
30 CAPSULE, EXTENDED RELEASE ORAL EVERY MORNING
Qty: 30 CAPSULE | Refills: 0 | Status: SHIPPED | OUTPATIENT
Start: 2022-06-13 | End: 2022-07-17 | Stop reason: SDUPTHER

## 2022-06-13 RX ORDER — TESTOSTERONE 16.2 MG/G
GEL TRANSDERMAL
Qty: 75 G | Refills: 0 | Status: SHIPPED | OUTPATIENT
Start: 2022-06-13 | End: 2022-07-17 | Stop reason: SDUPTHER

## 2022-06-13 RX ORDER — MELOXICAM 15 MG/1
15 TABLET ORAL DAILY PRN
Qty: 90 TABLET | Refills: 0 | Status: SHIPPED | OUTPATIENT
Start: 2022-06-13

## 2022-07-17 DIAGNOSIS — F41.9 ANXIETY AND DEPRESSION: ICD-10-CM

## 2022-07-17 DIAGNOSIS — F32.A ANXIETY AND DEPRESSION: ICD-10-CM

## 2022-07-17 DIAGNOSIS — E29.1 TESTICULAR HYPOGONADISM: ICD-10-CM

## 2022-07-17 DIAGNOSIS — F90.2 ADHD (ATTENTION DEFICIT HYPERACTIVITY DISORDER), COMBINED TYPE: ICD-10-CM

## 2022-07-18 RX ORDER — BUPROPION HYDROCHLORIDE 150 MG/1
150 TABLET ORAL EVERY MORNING
Qty: 30 TABLET | Refills: 0 | Status: SHIPPED | OUTPATIENT
Start: 2022-07-18 | End: 2022-10-28

## 2022-07-19 ENCOUNTER — TELEPHONE (OUTPATIENT)
Dept: INTERNAL MEDICINE CLINIC | Facility: CLINIC | Age: 52
End: 2022-07-19

## 2022-07-19 RX ORDER — TESTOSTERONE 16.2 MG/G
GEL TRANSDERMAL
Qty: 75 G | Refills: 0 | Status: SHIPPED | OUTPATIENT
Start: 2022-07-19 | End: 2022-09-11 | Stop reason: SDUPTHER

## 2022-07-19 RX ORDER — DEXTROAMPHETAMINE SACCHARATE, AMPHETAMINE ASPARTATE MONOHYDRATE, DEXTROAMPHETAMINE SULFATE AND AMPHETAMINE SULFATE 7.5; 7.5; 7.5; 7.5 MG/1; MG/1; MG/1; MG/1
30 CAPSULE, EXTENDED RELEASE ORAL EVERY MORNING
Qty: 30 CAPSULE | Refills: 0 | Status: SHIPPED | OUTPATIENT
Start: 2022-07-19 | End: 2022-08-14 | Stop reason: SDUPTHER

## 2022-07-19 NOTE — TELEPHONE ENCOUNTER
Pt called and stated that he would like to possibly decrease one of his medications, he finds it very hard to sit and concentrate and relax  He feels like he always has to be going

## 2022-07-22 ENCOUNTER — TELEPHONE (OUTPATIENT)
Dept: INTERNAL MEDICINE CLINIC | Facility: CLINIC | Age: 52
End: 2022-07-22

## 2022-08-07 DIAGNOSIS — E78.00 PURE HYPERCHOLESTEROLEMIA: ICD-10-CM

## 2022-08-08 RX ORDER — EZETIMIBE 10 MG/1
10 TABLET ORAL DAILY
Qty: 90 TABLET | Refills: 1 | Status: SHIPPED | OUTPATIENT
Start: 2022-08-08

## 2022-08-08 RX ORDER — EZETIMIBE 10 MG/1
10 TABLET ORAL DAILY
Qty: 90 TABLET | Refills: 1 | Status: SHIPPED | OUTPATIENT
Start: 2022-08-08 | End: 2022-08-08

## 2022-08-14 DIAGNOSIS — G25.81 RLS (RESTLESS LEGS SYNDROME): ICD-10-CM

## 2022-08-14 DIAGNOSIS — F90.2 ADHD (ATTENTION DEFICIT HYPERACTIVITY DISORDER), COMBINED TYPE: ICD-10-CM

## 2022-08-15 RX ORDER — ROPINIROLE 4 MG/1
4 TABLET, FILM COATED, EXTENDED RELEASE ORAL
Qty: 90 TABLET | Refills: 0 | Status: SHIPPED | OUTPATIENT
Start: 2022-08-15

## 2022-08-15 RX ORDER — DEXTROAMPHETAMINE SACCHARATE, AMPHETAMINE ASPARTATE MONOHYDRATE, DEXTROAMPHETAMINE SULFATE AND AMPHETAMINE SULFATE 7.5; 7.5; 7.5; 7.5 MG/1; MG/1; MG/1; MG/1
30 CAPSULE, EXTENDED RELEASE ORAL EVERY MORNING
Qty: 30 CAPSULE | Refills: 0 | Status: SHIPPED | OUTPATIENT
Start: 2022-08-15 | End: 2022-09-11 | Stop reason: SDUPTHER

## 2022-09-11 DIAGNOSIS — F90.2 ADHD (ATTENTION DEFICIT HYPERACTIVITY DISORDER), COMBINED TYPE: ICD-10-CM

## 2022-09-11 DIAGNOSIS — E29.1 TESTICULAR HYPOGONADISM: ICD-10-CM

## 2022-09-12 RX ORDER — DEXTROAMPHETAMINE SACCHARATE, AMPHETAMINE ASPARTATE MONOHYDRATE, DEXTROAMPHETAMINE SULFATE AND AMPHETAMINE SULFATE 7.5; 7.5; 7.5; 7.5 MG/1; MG/1; MG/1; MG/1
30 CAPSULE, EXTENDED RELEASE ORAL EVERY MORNING
Qty: 30 CAPSULE | Refills: 0 | Status: SHIPPED | OUTPATIENT
Start: 2022-09-12 | End: 2022-10-16 | Stop reason: SDUPTHER

## 2022-09-12 RX ORDER — TESTOSTERONE 16.2 MG/G
GEL TRANSDERMAL
Qty: 75 G | Refills: 0 | Status: SHIPPED | OUTPATIENT
Start: 2022-09-12 | End: 2022-10-05 | Stop reason: SDUPTHER

## 2022-10-05 DIAGNOSIS — G89.29 CHRONIC PAIN OF LEFT KNEE: ICD-10-CM

## 2022-10-05 DIAGNOSIS — M25.562 CHRONIC PAIN OF LEFT KNEE: ICD-10-CM

## 2022-10-05 DIAGNOSIS — E29.1 TESTICULAR HYPOGONADISM: ICD-10-CM

## 2022-10-05 RX ORDER — MELOXICAM 15 MG/1
15 TABLET ORAL DAILY PRN
Qty: 90 TABLET | Refills: 0 | Status: SHIPPED | OUTPATIENT
Start: 2022-10-05

## 2022-10-05 RX ORDER — TESTOSTERONE 16.2 MG/G
GEL TRANSDERMAL
Qty: 75 G | Refills: 0 | Status: SHIPPED | OUTPATIENT
Start: 2022-10-05

## 2022-10-16 DIAGNOSIS — F90.2 ADHD (ATTENTION DEFICIT HYPERACTIVITY DISORDER), COMBINED TYPE: ICD-10-CM

## 2022-10-17 RX ORDER — DEXTROAMPHETAMINE SACCHARATE, AMPHETAMINE ASPARTATE MONOHYDRATE, DEXTROAMPHETAMINE SULFATE AND AMPHETAMINE SULFATE 7.5; 7.5; 7.5; 7.5 MG/1; MG/1; MG/1; MG/1
30 CAPSULE, EXTENDED RELEASE ORAL EVERY MORNING
Qty: 30 CAPSULE | Refills: 0 | Status: SHIPPED | OUTPATIENT
Start: 2022-10-17 | End: 2022-10-28

## 2022-10-21 ENCOUNTER — RA CDI HCC (OUTPATIENT)
Dept: OTHER | Facility: HOSPITAL | Age: 52
End: 2022-10-21

## 2022-10-21 NOTE — PROGRESS NOTES
[facility]  coding opportunities       Chart reviewed, no opportunity found: CHART REVIEWED, NO OPPORTUNITY FOUND        Patients Insurance        Commercial Insurance: [employer]

## 2022-10-28 ENCOUNTER — OFFICE VISIT (OUTPATIENT)
Dept: INTERNAL MEDICINE CLINIC | Facility: CLINIC | Age: 52
End: 2022-10-28
Payer: COMMERCIAL

## 2022-10-28 VITALS
OXYGEN SATURATION: 98 % | DIASTOLIC BLOOD PRESSURE: 82 MMHG | HEIGHT: 66 IN | HEART RATE: 65 BPM | WEIGHT: 168.2 LBS | SYSTOLIC BLOOD PRESSURE: 120 MMHG | BODY MASS INDEX: 27.03 KG/M2 | TEMPERATURE: 97.3 F

## 2022-10-28 DIAGNOSIS — Z23 ENCOUNTER FOR ADMINISTRATION OF VACCINE: ICD-10-CM

## 2022-10-28 DIAGNOSIS — F90.2 ADHD (ATTENTION DEFICIT HYPERACTIVITY DISORDER), COMBINED TYPE: ICD-10-CM

## 2022-10-28 DIAGNOSIS — F51.01 PRIMARY INSOMNIA: Primary | ICD-10-CM

## 2022-10-28 PROCEDURE — 99214 OFFICE O/P EST MOD 30 MIN: CPT | Performed by: PHYSICIAN ASSISTANT

## 2022-10-28 PROCEDURE — 90682 RIV4 VACC RECOMBINANT DNA IM: CPT | Performed by: PHYSICIAN ASSISTANT

## 2022-10-28 PROCEDURE — 90471 IMMUNIZATION ADMIN: CPT | Performed by: PHYSICIAN ASSISTANT

## 2022-10-28 RX ORDER — AMOXICILLIN 500 MG/1
500 CAPSULE ORAL EVERY 8 HOURS SCHEDULED
COMMUNITY
Start: 2022-10-27

## 2022-10-28 RX ORDER — TRAZODONE HYDROCHLORIDE 50 MG/1
50-100 TABLET ORAL
Qty: 60 TABLET | Refills: 0 | Status: SHIPPED | OUTPATIENT
Start: 2022-10-28

## 2022-10-28 NOTE — ASSESSMENT & PLAN NOTE
Will change Adderall XR to Vyvanse  Directions for use and possible side effects discussed and patient verbalized understanding of these

## 2022-10-28 NOTE — PROGRESS NOTES
Name: Timothy Cheema      : 1970      MRN: [id number]  Encounter Provider: Lidia Callahan PA-C  Encounter Date: 10/28/2022   Encounter department: [address] [place] [address]  Primary insomnia  Assessment & Plan:  Start trazodone  Directions for use and possible side effects discussed and patient verbalized understanding of these  May need to titrate dose  Will have pt pursue iron level and if normal will adjust requip  Orders:  -     traZODone (DESYREL) 50 mg tablet; Take 1-2 tablets ( mg total) by mouth daily at bedtime    2  Encounter for administration of vaccine  -     influenza vaccine, quadrivalent, recombinant, PF, 0 5 mL, for patients 18 yr+ (FLUBLOK)    3  ADHD (attention deficit hyperactivity disorder), combined type  Assessment & Plan: Will change Adderall XR to Vyvanse  Directions for use and possible side effects discussed and patient verbalized understanding of these  Orders:  -     lisdexamfetamine (Vyvanse) 50 MG capsule; Take 1 capsule (50 mg total) by mouth every morning Max Daily Amount: 50 mg           Subjective      Pt presents for routine visit  He is up to date with labs and CRC screening  He is noting ongoing ADD symptoms with starting multiple tasks and not being able to complete them and always feeling driven by a motor and like he cant relax  He has been on Adderall for several years and is willing to try an alternative  He also notes issues sleeping due to combination of restless legs, trouble shutting his mind off and shoulder pain  He saw neurology and will be getting an iron level checked  He does not have daytime symptoms  Review of Systems   Constitutional: Negative for chills and fever  HENT: Negative for congestion, ear pain, hearing loss, postnasal drip, rhinorrhea, sinus pressure, sinus pain, sore throat and trouble swallowing  Eyes: Negative for pain and visual disturbance     Respiratory: Negative for cough, chest tightness, shortness of breath and wheezing  Cardiovascular: Negative  Negative for chest pain, palpitations and leg swelling  Gastrointestinal: Negative for abdominal pain, blood in stool, constipation, diarrhea, nausea and vomiting  Endocrine: Negative for cold intolerance, heat intolerance, polydipsia, polyphagia and polyuria  Genitourinary: Negative for difficulty urinating, dysuria, flank pain and urgency  Musculoskeletal: Negative for arthralgias, back pain, gait problem and myalgias  Skin: Negative for rash  Allergic/Immunologic: Negative  Neurological: Negative for dizziness, weakness, light-headedness and headaches  Hematological: Negative  Psychiatric/Behavioral: Positive for decreased concentration  Negative for behavioral problems, dysphoric mood and sleep disturbance  The patient is not nervous/anxious          Current Outpatient Medications on File Prior to Visit   Medication Sig   • amoxicillin (AMOXIL) 500 mg capsule Take 500 mg by mouth every 8 (eight) hours   • ezetimibe (ZETIA) 10 mg tablet Take 1 tablet (10 mg total) by mouth daily   • meloxicam (MOBIC) 15 mg tablet Take 1 tablet (15 mg total) by mouth daily as needed for moderate pain   • rOPINIRole (REQUIP XL) 4 MG 24 hr tablet Take 1 tablet (4 mg total) by mouth daily at bedtime   • testosterone (ANDROGEL) 1 62 % TD gel pump Apply 1 pump to one shoulder and 2 pumps to the other shoulder daily   • [DISCONTINUED] amphetamine-dextroamphetamine (ADDERALL XR, 30MG,) 30 MG 24 hr capsule Take 1 capsule (30 mg total) by mouth every morning Max Daily Amount: 30 mg   • acyclovir (ZOVIRAX) 200 mg capsule Take 1 capsule (200 mg total) by mouth 5 (five) times a day for 7 days   • [DISCONTINUED] amphetamine-dextroamphetamine (ADDERALL XR) 20 MG 24 hr capsule Take 1 capsule (20 mg total) by mouth every morning Max Daily Amount: 20 mg (Patient not taking: Reported on 10/28/2022)   • [DISCONTINUED] buPROPion (Wellbutrin XL) 150 mg 24 hr tablet Take 1 tablet (150 mg total) by mouth every morning (Patient not taking: Reported on 10/28/2022)   • [DISCONTINUED] FLUoxetine (PROzac) 10 mg capsule fluoxetine 10 mg capsule   • [DISCONTINUED] sildenafil (VIAGRA) 100 mg tablet Take 1 tablet (100 mg total) by mouth daily as needed for erectile dysfunction (Patient not taking: Reported on 10/28/2022)       Objective     /82   Pulse 65   Temp (!) 97 3 °F (36 3 °C) (Tympanic)   Ht 5' 6" (1 676 m)   Wt 76 3 kg (168 lb 3 2 oz)   SpO2 98%   BMI 27 15 kg/m²     Physical Exam  Constitutional:       General: He is not in acute distress  Appearance: He is well-developed  He is not diaphoretic  HENT:      Head: Normocephalic and atraumatic  Right Ear: External ear normal       Left Ear: External ear normal       Nose: Nose normal       Mouth/Throat:      Pharynx: No oropharyngeal exudate  Eyes:      General: No scleral icterus  Right eye: No discharge  Left eye: No discharge  Conjunctiva/sclera: Conjunctivae normal       Pupils: Pupils are equal, round, and reactive to light  Neck:      Thyroid: No thyromegaly  Cardiovascular:      Rate and Rhythm: Normal rate and regular rhythm  Heart sounds: Normal heart sounds  No murmur heard  No friction rub  No gallop  Pulmonary:      Effort: Pulmonary effort is normal  No respiratory distress  Breath sounds: Normal breath sounds  No wheezing or rales  Abdominal:      General: Bowel sounds are normal  There is no distension  Palpations: Abdomen is soft  Tenderness: There is no abdominal tenderness  Musculoskeletal:         General: No tenderness or deformity  Normal range of motion  Cervical back: Normal range of motion and neck supple  Skin:     General: Skin is warm and dry  Neurological:      Mental Status: He is alert and oriented to person, place, and time  Cranial Nerves: No cranial nerve deficit     Psychiatric: Behavior: Behavior normal          Thought Content:  Thought content normal          Judgment: Judgment normal        Gabby Mireles PA-C

## 2022-10-28 NOTE — ASSESSMENT & PLAN NOTE
Start trazodone  Directions for use and possible side effects discussed and patient verbalized understanding of these  May need to titrate dose  Will have pt pursue iron level and if normal will adjust requip

## 2022-11-06 DIAGNOSIS — G25.81 RLS (RESTLESS LEGS SYNDROME): ICD-10-CM

## 2022-11-06 DIAGNOSIS — E29.1 TESTICULAR HYPOGONADISM: ICD-10-CM

## 2022-11-07 RX ORDER — TESTOSTERONE 16.2 MG/G
GEL TRANSDERMAL
Qty: 75 G | Refills: 0 | Status: SHIPPED | OUTPATIENT
Start: 2022-11-07

## 2022-11-07 RX ORDER — ROPINIROLE 4 MG/1
4 TABLET, FILM COATED, EXTENDED RELEASE ORAL
Qty: 90 TABLET | Refills: 0 | Status: SHIPPED | OUTPATIENT
Start: 2022-11-07

## 2023-01-16 DIAGNOSIS — E29.1 TESTICULAR HYPOGONADISM: ICD-10-CM

## 2023-01-16 DIAGNOSIS — G89.29 CHRONIC PAIN OF LEFT KNEE: ICD-10-CM

## 2023-01-16 DIAGNOSIS — M25.562 CHRONIC PAIN OF LEFT KNEE: ICD-10-CM

## 2023-01-16 RX ORDER — TESTOSTERONE 16.2 MG/G
GEL TRANSDERMAL
Qty: 75 G | Refills: 0 | Status: SHIPPED | OUTPATIENT
Start: 2023-01-16

## 2023-01-16 RX ORDER — MELOXICAM 15 MG/1
15 TABLET ORAL DAILY PRN
Qty: 90 TABLET | Refills: 0 | Status: SHIPPED | OUTPATIENT
Start: 2023-01-16

## 2023-01-29 DIAGNOSIS — G25.81 RLS (RESTLESS LEGS SYNDROME): ICD-10-CM

## 2023-01-30 RX ORDER — ROPINIROLE 4 MG/1
4 TABLET, FILM COATED, EXTENDED RELEASE ORAL
Qty: 90 TABLET | Refills: 1 | Status: SHIPPED | OUTPATIENT
Start: 2023-01-30

## 2023-03-19 DIAGNOSIS — E29.1 TESTICULAR HYPOGONADISM: ICD-10-CM

## 2023-03-19 DIAGNOSIS — E78.00 PURE HYPERCHOLESTEROLEMIA: ICD-10-CM

## 2023-03-20 RX ORDER — EZETIMIBE 10 MG/1
10 TABLET ORAL DAILY
Qty: 90 TABLET | Refills: 0 | Status: SHIPPED | OUTPATIENT
Start: 2023-03-20

## 2023-03-20 RX ORDER — TESTOSTERONE 16.2 MG/G
GEL TRANSDERMAL
Qty: 75 G | Refills: 0 | Status: SHIPPED | OUTPATIENT
Start: 2023-03-20

## 2023-04-28 DIAGNOSIS — Z12.5 SCREENING PSA (PROSTATE SPECIFIC ANTIGEN): ICD-10-CM

## 2023-04-28 DIAGNOSIS — E78.00 PURE HYPERCHOLESTEROLEMIA: Primary | ICD-10-CM

## 2023-04-28 DIAGNOSIS — Z13.29 SCREENING FOR THYROID DISORDER: ICD-10-CM

## 2023-04-28 DIAGNOSIS — E55.9 VITAMIN D DEFICIENCY: ICD-10-CM

## 2023-04-28 DIAGNOSIS — E29.1 TESTICULAR HYPOGONADISM: ICD-10-CM

## 2023-05-05 ENCOUNTER — OFFICE VISIT (OUTPATIENT)
Dept: INTERNAL MEDICINE CLINIC | Facility: CLINIC | Age: 53
End: 2023-05-05

## 2023-05-05 VITALS
OXYGEN SATURATION: 97 % | HEIGHT: 66 IN | WEIGHT: 173.38 LBS | HEART RATE: 75 BPM | DIASTOLIC BLOOD PRESSURE: 66 MMHG | BODY MASS INDEX: 27.86 KG/M2 | TEMPERATURE: 97.6 F | SYSTOLIC BLOOD PRESSURE: 132 MMHG

## 2023-05-05 DIAGNOSIS — M54.50 CHRONIC LOW BACK PAIN WITHOUT SCIATICA, UNSPECIFIED BACK PAIN LATERALITY: ICD-10-CM

## 2023-05-05 DIAGNOSIS — G89.29 CHRONIC LOW BACK PAIN WITHOUT SCIATICA, UNSPECIFIED BACK PAIN LATERALITY: ICD-10-CM

## 2023-05-05 DIAGNOSIS — F32.A ANXIETY AND DEPRESSION: Primary | ICD-10-CM

## 2023-05-05 DIAGNOSIS — F41.9 ANXIETY AND DEPRESSION: Primary | ICD-10-CM

## 2023-05-05 RX ORDER — HYDROXYCHLOROQUINE SULFATE 200 MG/1
200 TABLET, FILM COATED ORAL 2 TIMES DAILY
COMMUNITY
Start: 2023-03-07 | End: 2024-03-06

## 2023-05-05 RX ORDER — DULOXETIN HYDROCHLORIDE 60 MG/1
60 CAPSULE, DELAYED RELEASE ORAL DAILY
Qty: 30 CAPSULE | Refills: 2 | Status: SHIPPED | OUTPATIENT
Start: 2023-05-05

## 2023-05-05 NOTE — PROGRESS NOTES
Name: Edward Aguilar      : 1970      MRN: [id number]  Encounter Provider: Luis A Newton PA-C  Encounter Date: 2023   Encounter department: [address] [place] [address]  Anxiety and depression  Assessment & Plan:  Start cymbalta  This will hopefully help both his mood as well his arthralgias and myalgias  Directions for use and possible side effects discussed and patient verbalized understanding of these  Orders:  -     DULoxetine (CYMBALTA) 60 mg delayed release capsule; Take 1 capsule (60 mg total) by mouth daily    2  Chronic low back pain without sciatica, unspecified back pain laterality      BMI Counseling: Body mass index is 27 98 kg/m²  The BMI is above normal  Nutrition recommendations include decreasing portion sizes, consuming healthier snacks and limiting drinks that contain sugar  Rationale for BMI follow-up plan is due to patient being overweight or obese  Subjective      Pt presents for routine visit  He is up to date with labs and CRC screening  He is noting issues with ongoing fatigue, joint pain and muscle aches  His RLS symptoms are overall improved but still flare from time to time  He admits to feeling a bit depressed lately due to the change in his physical health  HE has been following with rheumatology and is now on Plaquenil  He was counseled on the need to have routine eye exams with that medication  Review of Systems   Constitutional: Positive for fatigue  Negative for chills and fever  HENT: Negative for congestion, ear pain, hearing loss, postnasal drip, rhinorrhea, sinus pressure, sinus pain, sore throat and trouble swallowing  Eyes: Negative for pain and visual disturbance  Respiratory: Negative for cough, chest tightness, shortness of breath and wheezing  Cardiovascular: Negative  Negative for chest pain, palpitations and leg swelling     Gastrointestinal: Negative for abdominal pain, blood in stool, constipation, "diarrhea, nausea and vomiting  Endocrine: Negative for cold intolerance, heat intolerance, polydipsia, polyphagia and polyuria  Genitourinary: Negative for difficulty urinating, dysuria, flank pain and urgency  Musculoskeletal: Positive for arthralgias and myalgias  Negative for back pain and gait problem  Skin: Negative for rash  Allergic/Immunologic: Negative  Neurological: Negative for dizziness, weakness, light-headedness and headaches  Hematological: Negative  Psychiatric/Behavioral: Positive for dysphoric mood  Negative for behavioral problems and sleep disturbance  The patient is not nervous/anxious  Current Outpatient Medications on File Prior to Visit   Medication Sig    ezetimibe (ZETIA) 10 mg tablet Take 1 tablet (10 mg total) by mouth daily    hydroxychloroquine (PLAQUENIL) 200 mg tablet Take 200 mg by mouth 2 (two) times a day    meloxicam (MOBIC) 15 mg tablet Take 1 tablet (15 mg total) by mouth daily as needed for moderate pain    rOPINIRole (REQUIP XL) 4 MG 24 hr tablet Take 1 tablet (4 mg total) by mouth daily at bedtime    testosterone (ANDROGEL) 1 62 % TD gel pump Apply 1 pump to one shoulder and 2 pumps to the other shoulder daily    traZODone (DESYREL) 50 mg tablet Take 1-2 tablets ( mg total) by mouth daily at bedtime    acyclovir (ZOVIRAX) 200 mg capsule Take 1 capsule (200 mg total) by mouth 5 (five) times a day for 7 days    amoxicillin (AMOXIL) 500 mg capsule Take 500 mg by mouth every 8 (eight) hours (Patient not taking: Reported on 5/5/2023)    lisdexamfetamine (Vyvanse) 50 MG capsule Take 1 capsule (50 mg total) by mouth every morning Max Daily Amount: 50 mg (Patient not taking: Reported on 5/5/2023)       Objective     /66 (BP Location: Left arm, Patient Position: Sitting)   Pulse 75   Temp 97 6 °F (36 4 °C)   Ht 5' 6\" (1 676 m)   Wt 78 6 kg (173 lb 6 oz)   SpO2 97%   BMI 27 98 kg/m²     Physical Exam  Vitals and nursing note reviewed   " Constitutional:       General: He is not in acute distress  Appearance: He is well-developed  He is not diaphoretic  HENT:      Head: Normocephalic and atraumatic  Right Ear: External ear normal       Left Ear: External ear normal       Nose: Nose normal       Mouth/Throat:      Pharynx: No oropharyngeal exudate  Eyes:      General: No scleral icterus  Right eye: No discharge  Left eye: No discharge  Conjunctiva/sclera: Conjunctivae normal       Pupils: Pupils are equal, round, and reactive to light  Neck:      Thyroid: No thyromegaly  Cardiovascular:      Rate and Rhythm: Normal rate and regular rhythm  Heart sounds: Normal heart sounds  No murmur heard  No friction rub  No gallop  Pulmonary:      Effort: Pulmonary effort is normal  No respiratory distress  Breath sounds: Normal breath sounds  No wheezing or rales  Abdominal:      General: Bowel sounds are normal  There is no distension  Palpations: Abdomen is soft  Tenderness: There is no abdominal tenderness  Musculoskeletal:         General: No deformity  Cervical back: Normal range of motion and neck supple  Right knee: Decreased range of motion  Tenderness present  Left knee: Decreased range of motion  Tenderness present  Skin:     General: Skin is warm and dry  Neurological:      Mental Status: He is alert and oriented to person, place, and time  Cranial Nerves: No cranial nerve deficit  Psychiatric:         Mood and Affect: Mood is depressed  Behavior: Behavior normal          Thought Content:  Thought content normal          Judgment: Judgment normal        Gabby Mireles PA-C

## 2023-05-05 NOTE — ASSESSMENT & PLAN NOTE
Start cymbalta  This will hopefully help both his mood as well his arthralgias and myalgias  Directions for use and possible side effects discussed and patient verbalized understanding of these

## 2023-05-07 DIAGNOSIS — G89.29 CHRONIC PAIN OF LEFT KNEE: ICD-10-CM

## 2023-05-07 DIAGNOSIS — E29.1 TESTICULAR HYPOGONADISM: ICD-10-CM

## 2023-05-07 DIAGNOSIS — M25.562 CHRONIC PAIN OF LEFT KNEE: ICD-10-CM

## 2023-05-08 RX ORDER — MELOXICAM 15 MG/1
15 TABLET ORAL DAILY PRN
Qty: 90 TABLET | Refills: 0 | Status: SHIPPED | OUTPATIENT
Start: 2023-05-08

## 2023-05-08 RX ORDER — TESTOSTERONE 16.2 MG/G
GEL TRANSDERMAL
Qty: 75 G | Refills: 0 | Status: SHIPPED | OUTPATIENT
Start: 2023-05-08

## 2023-06-26 DIAGNOSIS — F41.9 ANXIETY AND DEPRESSION: Primary | ICD-10-CM

## 2023-06-26 DIAGNOSIS — F32.A ANXIETY AND DEPRESSION: Primary | ICD-10-CM

## 2023-07-03 DIAGNOSIS — E78.00 PURE HYPERCHOLESTEROLEMIA: ICD-10-CM

## 2023-07-03 RX ORDER — EZETIMIBE 10 MG/1
10 TABLET ORAL DAILY
Qty: 90 TABLET | Refills: 0 | Status: SHIPPED | OUTPATIENT
Start: 2023-07-03 | End: 2023-07-07

## 2023-07-07 ENCOUNTER — OFFICE VISIT (OUTPATIENT)
Dept: INTERNAL MEDICINE CLINIC | Facility: CLINIC | Age: 53
End: 2023-07-07
Payer: COMMERCIAL

## 2023-07-07 VITALS
BODY MASS INDEX: 27.24 KG/M2 | DIASTOLIC BLOOD PRESSURE: 68 MMHG | TEMPERATURE: 98 F | OXYGEN SATURATION: 96 % | WEIGHT: 169.5 LBS | SYSTOLIC BLOOD PRESSURE: 124 MMHG | HEIGHT: 66 IN | HEART RATE: 77 BPM

## 2023-07-07 DIAGNOSIS — L25.9 CONTACT DERMATITIS, UNSPECIFIED CONTACT DERMATITIS TYPE, UNSPECIFIED TRIGGER: ICD-10-CM

## 2023-07-07 DIAGNOSIS — F41.9 ANXIETY AND DEPRESSION: Primary | ICD-10-CM

## 2023-07-07 DIAGNOSIS — E29.1 TESTICULAR HYPOGONADISM: ICD-10-CM

## 2023-07-07 DIAGNOSIS — F32.A ANXIETY AND DEPRESSION: Primary | ICD-10-CM

## 2023-07-07 PROCEDURE — 99214 OFFICE O/P EST MOD 30 MIN: CPT | Performed by: PHYSICIAN ASSISTANT

## 2023-07-07 RX ORDER — METHYLPREDNISOLONE 4 MG/1
TABLET ORAL
Qty: 21 EACH | Refills: 0 | Status: SHIPPED | OUTPATIENT
Start: 2023-07-07

## 2023-07-07 RX ORDER — TESTOSTERONE 16.2 MG/G
GEL TRANSDERMAL
Qty: 75 G | Refills: 0 | Status: SHIPPED | OUTPATIENT
Start: 2023-07-07

## 2023-07-07 NOTE — PROGRESS NOTES
Name: Connie Tijerina      : 1970      MRN: [id number]  Encounter Provider: Reji Hutchins PA-C  Encounter Date: 2023   Encounter department: [id number] [address] [place]     1. Anxiety and depression  Assessment & Plan:  Pts symptoms are stable with current regime. No changes at present. 2. Testicular hypogonadism  -     testosterone (ANDROGEL) 1.62 % TD gel pump; Apply 1 pump to one shoulder and 2 pumps to the other shoulder daily    3. Contact dermatitis, unspecified contact dermatitis type, unspecified trigger  Assessment & Plan:  Start medrol dose pack. Directions for use and possible side effects discussed and patient verbalized understanding of these. Orders:  -     methylPREDNISolone 4 MG tablet therapy pack; Use as directed on package           Subjective      Pt presents for routine visit. He is up to date with labs and CRC screening. He is noting improvement in both his mood and energy level with Fetzima and tolerates this well. He feels the dose is adequate. BP is normal. He relates a red, itchy, hot rash on his left antecubital space x 1 week. Review of Systems   Constitutional: Negative for chills and fever. HENT: Negative for congestion, ear pain, hearing loss, postnasal drip, rhinorrhea, sinus pressure, sinus pain, sore throat and trouble swallowing. Eyes: Negative for pain and visual disturbance. Respiratory: Negative for cough, chest tightness, shortness of breath and wheezing. Cardiovascular: Negative. Negative for chest pain, palpitations and leg swelling. Gastrointestinal: Negative for abdominal pain, blood in stool, constipation, diarrhea, nausea and vomiting. Endocrine: Negative for cold intolerance, heat intolerance, polydipsia, polyphagia and polyuria. Genitourinary: Negative for difficulty urinating, dysuria, flank pain and urgency. Musculoskeletal: Negative for arthralgias, back pain, gait problem and myalgias.    Skin: Positive for rash. Allergic/Immunologic: Negative. Neurological: Negative for dizziness, weakness, light-headedness and headaches. Hematological: Negative. Psychiatric/Behavioral: Negative for behavioral problems, dysphoric mood and sleep disturbance. The patient is not nervous/anxious.         Current Outpatient Medications on File Prior to Visit   Medication Sig   • hydroxychloroquine (PLAQUENIL) 200 mg tablet Take 200 mg by mouth 2 (two) times a day   • Levomilnacipran HCl ER (Fetzima) 40 MG extended release capsule Take 1 capsule (40 mg total) by mouth daily   • meloxicam (MOBIC) 15 mg tablet Take 1 tablet (15 mg total) by mouth daily as needed for moderate pain   • rOPINIRole (REQUIP XL) 4 MG 24 hr tablet Take 1 tablet (4 mg total) by mouth daily at bedtime   • [DISCONTINUED] ezetimibe (ZETIA) 10 mg tablet Take 1 tablet (10 mg total) by mouth daily   • [DISCONTINUED] testosterone (ANDROGEL) 1.62 % TD gel pump Apply 1 pump to one shoulder and 2 pumps to the other shoulder daily   • acyclovir (ZOVIRAX) 200 mg capsule Take 1 capsule (200 mg total) by mouth 5 (five) times a day for 7 days   • [DISCONTINUED] amoxicillin (AMOXIL) 500 mg capsule Take 500 mg by mouth every 8 (eight) hours (Patient not taking: Reported on 5/5/2023)   • [DISCONTINUED] DULoxetine (CYMBALTA) 60 mg delayed release capsule Take 1 capsule (60 mg total) by mouth daily (Patient not taking: Reported on 7/7/2023)   • [DISCONTINUED] lisdexamfetamine (Vyvanse) 50 MG capsule Take 1 capsule (50 mg total) by mouth every morning Max Daily Amount: 50 mg (Patient not taking: Reported on 5/5/2023)   • [DISCONTINUED] traZODone (DESYREL) 50 mg tablet Take 1-2 tablets ( mg total) by mouth daily at bedtime (Patient not taking: Reported on 7/7/2023)       Objective     /68 (BP Location: Right arm, Patient Position: Sitting)   Pulse 77   Temp 98 °F (36.7 °C)   Ht 5' 6" (1.676 m)   Wt 76.9 kg (169 lb 8 oz)   SpO2 96%   BMI 27.36 kg/m² Physical Exam  Vitals and nursing note reviewed. Constitutional:       General: He is not in acute distress. Appearance: He is well-developed. He is not diaphoretic. HENT:      Head: Normocephalic and atraumatic. Right Ear: External ear normal.      Left Ear: External ear normal.      Nose: Nose normal.      Mouth/Throat:      Pharynx: No oropharyngeal exudate. Eyes:      General: No scleral icterus. Right eye: No discharge. Left eye: No discharge. Conjunctiva/sclera: Conjunctivae normal.      Pupils: Pupils are equal, round, and reactive to light. Neck:      Thyroid: No thyromegaly. Cardiovascular:      Rate and Rhythm: Normal rate and regular rhythm. Heart sounds: Normal heart sounds. No murmur heard. No friction rub. No gallop. Pulmonary:      Effort: Pulmonary effort is normal. No respiratory distress. Breath sounds: Normal breath sounds. No wheezing or rales. Abdominal:      General: Bowel sounds are normal. There is no distension. Palpations: Abdomen is soft. Tenderness: There is no abdominal tenderness. Musculoskeletal:         General: No tenderness or deformity. Normal range of motion. Cervical back: Normal range of motion and neck supple. Skin:     General: Skin is warm and dry. Findings: Rash present. Rash is papular. Neurological:      Mental Status: He is alert and oriented to person, place, and time. Cranial Nerves: No cranial nerve deficit. Psychiatric:         Behavior: Behavior normal.         Thought Content:  Thought content normal.         Judgment: Judgment normal.       Gabby Mireles PA-C

## 2023-07-07 NOTE — ASSESSMENT & PLAN NOTE
Start medrol dose pack. Directions for use and possible side effects discussed and patient verbalized understanding of these.

## 2023-07-17 DIAGNOSIS — G25.81 RLS (RESTLESS LEGS SYNDROME): ICD-10-CM

## 2023-07-17 RX ORDER — ROPINIROLE 4 MG/1
4 TABLET, FILM COATED, EXTENDED RELEASE ORAL
Qty: 90 TABLET | Refills: 1 | Status: SHIPPED | OUTPATIENT
Start: 2023-07-17

## 2023-07-24 DIAGNOSIS — F41.9 ANXIETY AND DEPRESSION: ICD-10-CM

## 2023-07-24 DIAGNOSIS — F32.A ANXIETY AND DEPRESSION: ICD-10-CM

## 2023-08-05 DIAGNOSIS — M25.562 CHRONIC PAIN OF LEFT KNEE: ICD-10-CM

## 2023-08-05 DIAGNOSIS — G89.29 CHRONIC PAIN OF LEFT KNEE: ICD-10-CM

## 2023-08-07 RX ORDER — MELOXICAM 15 MG/1
15 TABLET ORAL DAILY PRN
Qty: 90 TABLET | Refills: 0 | Status: SHIPPED | OUTPATIENT
Start: 2023-08-07

## 2023-08-08 DIAGNOSIS — F41.9 ANXIETY AND DEPRESSION: Primary | ICD-10-CM

## 2023-08-08 DIAGNOSIS — F32.A ANXIETY AND DEPRESSION: Primary | ICD-10-CM

## 2023-08-08 RX ORDER — VENLAFAXINE HYDROCHLORIDE 75 MG/1
75 CAPSULE, EXTENDED RELEASE ORAL
Qty: 90 CAPSULE | Refills: 3 | Status: SHIPPED | OUTPATIENT
Start: 2023-08-08

## 2023-08-22 DIAGNOSIS — F41.9 ANXIETY AND DEPRESSION: Primary | ICD-10-CM

## 2023-08-22 DIAGNOSIS — F32.A ANXIETY AND DEPRESSION: Primary | ICD-10-CM

## 2023-10-08 DIAGNOSIS — G25.81 RLS (RESTLESS LEGS SYNDROME): ICD-10-CM

## 2023-10-09 RX ORDER — ROPINIROLE 4 MG/1
4 TABLET, FILM COATED, EXTENDED RELEASE ORAL
Qty: 90 TABLET | Refills: 3 | Status: SHIPPED | OUTPATIENT
Start: 2023-10-09

## 2023-11-01 DIAGNOSIS — L25.9 CONTACT DERMATITIS, UNSPECIFIED CONTACT DERMATITIS TYPE, UNSPECIFIED TRIGGER: ICD-10-CM

## 2023-11-01 RX ORDER — METHYLPREDNISOLONE 4 MG/1
TABLET ORAL
Qty: 21 EACH | Refills: 0 | Status: SHIPPED | OUTPATIENT
Start: 2023-11-01

## 2023-11-06 DIAGNOSIS — M25.562 CHRONIC PAIN OF LEFT KNEE: ICD-10-CM

## 2023-11-06 DIAGNOSIS — G89.29 CHRONIC PAIN OF LEFT KNEE: ICD-10-CM

## 2023-11-07 RX ORDER — MELOXICAM 15 MG/1
15 TABLET ORAL DAILY PRN
Qty: 90 TABLET | Refills: 1 | Status: SHIPPED | OUTPATIENT
Start: 2023-11-07

## 2023-11-26 DIAGNOSIS — F32.A ANXIETY AND DEPRESSION: ICD-10-CM

## 2023-11-26 DIAGNOSIS — F41.9 ANXIETY AND DEPRESSION: ICD-10-CM

## 2024-01-08 ENCOUNTER — RA CDI HCC (OUTPATIENT)
Dept: OTHER | Facility: HOSPITAL | Age: 54
End: 2024-01-08

## 2024-01-12 ENCOUNTER — OFFICE VISIT (OUTPATIENT)
Dept: INTERNAL MEDICINE CLINIC | Facility: CLINIC | Age: 54
End: 2024-01-12
Payer: COMMERCIAL

## 2024-01-12 VITALS
HEART RATE: 75 BPM | OXYGEN SATURATION: 99 % | WEIGHT: 177 LBS | DIASTOLIC BLOOD PRESSURE: 64 MMHG | BODY MASS INDEX: 28.45 KG/M2 | HEIGHT: 66 IN | SYSTOLIC BLOOD PRESSURE: 122 MMHG | TEMPERATURE: 96.8 F

## 2024-01-12 DIAGNOSIS — Z12.5 SCREENING PSA (PROSTATE SPECIFIC ANTIGEN): ICD-10-CM

## 2024-01-12 DIAGNOSIS — Z23 ENCOUNTER FOR IMMUNIZATION: ICD-10-CM

## 2024-01-12 DIAGNOSIS — E78.00 PURE HYPERCHOLESTEROLEMIA: ICD-10-CM

## 2024-01-12 DIAGNOSIS — Z13.0 SCREENING FOR DEFICIENCY ANEMIA: ICD-10-CM

## 2024-01-12 DIAGNOSIS — Z11.4 SCREENING FOR HIV (HUMAN IMMUNODEFICIENCY VIRUS): ICD-10-CM

## 2024-01-12 DIAGNOSIS — Z13.29 SCREENING FOR THYROID DISORDER: ICD-10-CM

## 2024-01-12 DIAGNOSIS — Z13.1 SCREENING FOR DIABETES MELLITUS: ICD-10-CM

## 2024-01-12 DIAGNOSIS — M47.816 LUMBAR SPONDYLOSIS: ICD-10-CM

## 2024-01-12 DIAGNOSIS — E55.9 VITAMIN D DEFICIENCY: ICD-10-CM

## 2024-01-12 DIAGNOSIS — F90.2 ADHD (ATTENTION DEFICIT HYPERACTIVITY DISORDER), COMBINED TYPE: Primary | ICD-10-CM

## 2024-01-12 PROCEDURE — 99214 OFFICE O/P EST MOD 30 MIN: CPT | Performed by: PHYSICIAN ASSISTANT

## 2024-01-12 RX ORDER — METHYLPHENIDATE HYDROCHLORIDE 36 MG/1
36 TABLET ORAL DAILY
Qty: 30 TABLET | Refills: 0 | Status: SHIPPED | OUTPATIENT
Start: 2024-01-12 | End: 2024-01-25

## 2024-01-12 NOTE — PROGRESS NOTES
Name: Merrick Garner      : 1970      MRN: [id number]  Encounter Provider: Gabby Mireles PA-C  Encounter Date: 2024   Encounter department: [facility]    Assessment & Plan     1. ADHD (attention deficit hyperactivity disorder), combined type  Assessment & Plan:  Pt scored highly positive. Will start Concerta. Directions for use and possible side effects discussed and patient verbalized understanding of these.  Narcotic agreement reviewed and signed by both pt and myself. PDMP reviewed and appropriate.     Orders:  -     methylphenidate (Concerta) 36 MG ER tablet; Take 1 tablet (36 mg total) by mouth daily Max Daily Amount: 36 mg    2. Screening for HIV (human immunodeficiency virus)    3. Encounter for immunization  -     TDAP VACCINE GREATER THAN OR EQUAL TO 6YO IM  -     Zoster Vaccine Recombinant IM    4. Pure hypercholesterolemia  -     Lipid panel; Future; Expected date: 2024    5. Vitamin D deficiency  -     Vitamin D 25 hydroxy; Future; Expected date: 2024    6. Screening for thyroid disorder  -     TSH, 3rd generation; Future; Expected date: 2024    7. Screening PSA (prostate specific antigen)  -     PSA, Total Screen; Future; Expected date: 2024    8. Lumbar spondylosis  -     Magnesium; Future; Expected date: 2024  -     Phosphorus; Future; Expected date: 2024    9. Screening for deficiency anemia  -     CBC and differential; Future; Expected date: 2024    10. Screening for diabetes mellitus  -     Comprehensive metabolic panel; Future; Expected date: 2024        Depression Screening and Follow-up Plan: Patient was screened for depression during today's encounter. They screened negative with a PHQ-9 score of 0.        Subjective      Pt presents for routine visit. He is noting issues with executive functioning. He has trouble retaining information and doing tasks that he has done several times previously. He had previously  been treated for ADD with adderall, strattera and vyvanse. He felt that they all worked for a short time before the effectiveness wore off. He is due for labs. CRC screening is current. ADD screening tool completed and highly positive.       Review of Systems   Constitutional:  Negative for chills and fever.   HENT:  Negative for congestion, ear pain, hearing loss, postnasal drip, rhinorrhea, sinus pressure, sinus pain, sore throat and trouble swallowing.    Eyes:  Negative for pain and visual disturbance.   Respiratory:  Negative for cough, chest tightness, shortness of breath and wheezing.    Cardiovascular: Negative.  Negative for chest pain, palpitations and leg swelling.   Gastrointestinal:  Negative for abdominal pain, blood in stool, constipation, diarrhea, nausea and vomiting.   Endocrine: Negative for cold intolerance, heat intolerance, polydipsia, polyphagia and polyuria.   Genitourinary:  Negative for difficulty urinating, dysuria, flank pain and urgency.   Musculoskeletal:  Negative for arthralgias, back pain, gait problem and myalgias.   Skin:  Negative for rash.   Allergic/Immunologic: Negative.    Neurological:  Negative for dizziness, weakness, light-headedness and headaches.   Hematological: Negative.    Psychiatric/Behavioral:  Positive for confusion and decreased concentration. Negative for behavioral problems, dysphoric mood and sleep disturbance. The patient is not nervous/anxious.        Current Outpatient Medications on File Prior to Visit   Medication Sig    hydroxychloroquine (PLAQUENIL) 200 mg tablet Take 200 mg by mouth 2 (two) times a day    meloxicam (MOBIC) 15 mg tablet Take 1 tablet (15 mg total) by mouth daily as needed for moderate pain    rOPINIRole (REQUIP XL) 4 MG 24 hr tablet Take 1 tablet (4 mg total) by mouth daily at bedtime    testosterone (ANDROGEL) 1.62 % TD gel pump Apply 1 pump to one shoulder and 2 pumps to the other shoulder daily       Objective     /64   Pulse  "75   Temp (!) 96.8 °F (36 °C)   Ht 5' 6\" (1.676 m)   Wt 80.3 kg (177 lb)   SpO2 99%   BMI 28.57 kg/m²     Physical Exam  Vitals and nursing note reviewed.   Constitutional:       General: He is not in acute distress.     Appearance: Normal appearance. He is well-developed. He is not diaphoretic.   HENT:      Head: Normocephalic and atraumatic.      Right Ear: External ear normal.      Left Ear: External ear normal.      Nose: Nose normal.      Mouth/Throat:      Pharynx: No oropharyngeal exudate.   Eyes:      General: No scleral icterus.        Right eye: No discharge.         Left eye: No discharge.      Conjunctiva/sclera: Conjunctivae normal.      Pupils: Pupils are equal, round, and reactive to light.   Neck:      Thyroid: No thyromegaly.   Cardiovascular:      Rate and Rhythm: Normal rate and regular rhythm.      Heart sounds: Normal heart sounds. No murmur heard.     No friction rub. No gallop.   Pulmonary:      Effort: Pulmonary effort is normal. No respiratory distress.      Breath sounds: Normal breath sounds. No wheezing or rales.   Abdominal:      General: Bowel sounds are normal. There is no distension.      Palpations: Abdomen is soft.      Tenderness: There is no abdominal tenderness.   Musculoskeletal:         General: No tenderness or deformity. Normal range of motion.      Cervical back: Normal range of motion and neck supple.   Skin:     General: Skin is warm and dry.   Neurological:      Mental Status: He is alert and oriented to person, place, and time.      Cranial Nerves: No cranial nerve deficit.   Psychiatric:         Mood and Affect: Mood is anxious.         Behavior: Behavior normal.         Thought Content: Thought content normal.         Cognition and Memory: Memory is impaired.         Judgment: Judgment normal.       Gabby Mireles PA-C    "

## 2024-01-15 NOTE — ASSESSMENT & PLAN NOTE
Pt scored highly positive. Will start Concerta. Directions for use and possible side effects discussed and patient verbalized understanding of these.  Narcotic agreement reviewed and signed by both pt and myself. PDMP reviewed and appropriate.

## 2024-01-25 ENCOUNTER — TELEPHONE (OUTPATIENT)
Dept: INTERNAL MEDICINE CLINIC | Facility: CLINIC | Age: 54
End: 2024-01-25

## 2024-01-25 DIAGNOSIS — G93.32 CHRONIC FATIGUE SYNDROME: Primary | ICD-10-CM

## 2024-01-25 RX ORDER — ARMODAFINIL 150 MG/1
150 TABLET ORAL DAILY
Qty: 30 TABLET | Refills: 0 | Status: SHIPPED | OUTPATIENT
Start: 2024-01-25

## 2024-01-25 NOTE — TELEPHONE ENCOUNTER
Hi, this is Merrick with birth date [birth year]. My phone number is [phone]. I saw Allyson about a week ago when she changed her medications and I'm not doing very well. I did send her a message in the portal portal yesterday and follow up message today. I know they're 5120 hour response time. I'm struggling to do my job right now and I'm wondering if she can please take a look at that and see if she has another course of action. I can try again. Merrick, [phone], thank you.

## 2024-01-26 DIAGNOSIS — F90.2 ADHD (ATTENTION DEFICIT HYPERACTIVITY DISORDER), COMBINED TYPE: Primary | ICD-10-CM

## 2024-03-20 DIAGNOSIS — E29.1 TESTICULAR HYPOGONADISM: ICD-10-CM

## 2024-03-20 RX ORDER — TESTOSTERONE 16.2 MG/G
GEL TRANSDERMAL
Qty: 75 G | Refills: 0 | Status: SHIPPED | OUTPATIENT
Start: 2024-03-20

## 2024-04-10 DIAGNOSIS — B00.1 COLD SORE: Primary | ICD-10-CM

## 2024-04-10 RX ORDER — ACYCLOVIR 200 MG/1
400 CAPSULE ORAL 3 TIMES DAILY
Qty: 42 CAPSULE | Refills: 0 | Status: SHIPPED | OUTPATIENT
Start: 2024-04-10 | End: 2024-04-17

## 2024-05-14 ENCOUNTER — OFFICE VISIT (OUTPATIENT)
Dept: INTERNAL MEDICINE CLINIC | Facility: CLINIC | Age: 54
End: 2024-05-14
Payer: COMMERCIAL

## 2024-05-14 VITALS
SYSTOLIC BLOOD PRESSURE: 130 MMHG | WEIGHT: 174.25 LBS | TEMPERATURE: 96.1 F | HEART RATE: 86 BPM | BODY MASS INDEX: 29.03 KG/M2 | HEIGHT: 65 IN | DIASTOLIC BLOOD PRESSURE: 88 MMHG | OXYGEN SATURATION: 99 %

## 2024-05-14 DIAGNOSIS — M35.00 SJOGREN'S SYNDROME WITHOUT EXTRAGLANDULAR INVOLVEMENT (HCC): ICD-10-CM

## 2024-05-14 DIAGNOSIS — R05.2 SUBACUTE COUGH: ICD-10-CM

## 2024-05-14 DIAGNOSIS — G25.81 RLS (RESTLESS LEGS SYNDROME): Primary | ICD-10-CM

## 2024-05-14 DIAGNOSIS — Z12.5 SCREENING PSA (PROSTATE SPECIFIC ANTIGEN): ICD-10-CM

## 2024-05-14 PROCEDURE — 99214 OFFICE O/P EST MOD 30 MIN: CPT | Performed by: PHYSICIAN ASSISTANT

## 2024-05-14 RX ORDER — BUPROPION HYDROCHLORIDE 150 MG/1
150 TABLET, EXTENDED RELEASE ORAL 2 TIMES DAILY
COMMUNITY

## 2024-05-15 PROBLEM — R05.2 SUBACUTE COUGH: Status: ACTIVE | Noted: 2024-05-15

## 2024-05-15 PROBLEM — M25.511 RIGHT SHOULDER PAIN: Status: RESOLVED | Noted: 2019-07-01 | Resolved: 2024-05-15

## 2024-05-15 PROBLEM — G89.29 CHRONIC LOW BACK PAIN WITHOUT SCIATICA: Status: RESOLVED | Noted: 2019-07-01 | Resolved: 2024-05-15

## 2024-05-15 PROBLEM — M54.50 CHRONIC LOW BACK PAIN WITHOUT SCIATICA: Status: RESOLVED | Noted: 2019-07-01 | Resolved: 2024-05-15

## 2024-05-15 LAB
25(OH)D3+25(OH)D2 SERPL-MCNC: 48 NG/ML (ref 30–100)
ALBUMIN SERPL-MCNC: 4.2 G/DL (ref 3.5–5.7)
ALP SERPL-CCNC: 49 U/L (ref 35–120)
ALT SERPL-CCNC: 21 U/L
ANION GAP SERPL CALCULATED.3IONS-SCNC: 10 MMOL/L (ref 3–11)
AST SERPL-CCNC: 18 U/L
BASOPHILS # BLD AUTO: 0 THOU/CMM (ref 0–0.1)
BASOPHILS NFR BLD AUTO: 1 %
BILIRUB SERPL-MCNC: 0.4 MG/DL (ref 0.2–1)
BUN SERPL-MCNC: 24 MG/DL (ref 7–28)
CALCIUM SERPL-MCNC: 9.2 MG/DL (ref 8.5–10.1)
CHLORIDE SERPL-SCNC: 108 MMOL/L (ref 100–109)
CHOLEST SERPL-MCNC: 185 MG/DL
CHOLEST/HDLC SERPL: 4.2 {RATIO}
CO2 SERPL-SCNC: 22 MMOL/L (ref 21–31)
CREAT SERPL-MCNC: 0.87 MG/DL (ref 0.53–1.3)
CYTOLOGY CMNT CVX/VAG CYTO-IMP: NORMAL
DIFFERENTIAL METHOD BLD: NORMAL
EOSINOPHIL # BLD AUTO: 0.3 THOU/CMM (ref 0–0.5)
EOSINOPHIL NFR BLD AUTO: 8 %
ERYTHROCYTE [DISTWIDTH] IN BLOOD BY AUTOMATED COUNT: 13.2 % (ref 12–16)
GFR/BSA.PRED SERPLBLD CYS-BASED-ARV: 102 ML/MIN/{1.73_M2}
GLUCOSE SERPL-MCNC: 98 MG/DL (ref 65–99)
HCT VFR BLD AUTO: 43 % (ref 37–48)
HDLC SERPL-MCNC: 44 MG/DL (ref 23–92)
HGB BLD-MCNC: 15 G/DL (ref 12.5–17)
IRON SATN MFR SERPL: 27 % (ref 20–50)
IRON SERPL-MCNC: 80 UG/DL (ref 50–212)
LDLC SERPL CALC-MCNC: 118 MG/DL
LYMPHOCYTES # BLD AUTO: 1.2 THOU/CMM (ref 1–3)
LYMPHOCYTES NFR BLD AUTO: 27 %
MAGNESIUM SERPL-MCNC: 2 MG/DL (ref 1.4–2.2)
MCH RBC QN AUTO: 29.2 PG (ref 27–36)
MCHC RBC AUTO-ENTMCNC: 34.8 G/DL (ref 32–37)
MCV RBC AUTO: 84 FL (ref 80–100)
MONOCYTES # BLD AUTO: 0.4 THOU/CMM (ref 0.3–1)
MONOCYTES NFR BLD AUTO: 10 %
NEUTROPHILS # BLD AUTO: 2.4 THOU/CMM (ref 1.8–7.8)
NEUTROPHILS NFR BLD AUTO: 54 %
NONHDLC SERPL-MCNC: 141 MG/DL
PHOSPHATE SERPL-MCNC: 2.8 MG/DL (ref 2.3–4.6)
PLATELET # BLD AUTO: 213 THOU/CMM (ref 140–350)
PMV BLD REES-ECKER: 7.5 FL (ref 7.5–11.3)
POTASSIUM SERPL-SCNC: 4.1 MMOL/L (ref 3.5–5.2)
PROT SERPL-MCNC: 6.6 G/DL (ref 6.3–8.3)
PSA SERPL-MCNC: 0.24 NG/ML
RBC # BLD AUTO: 5.12 MILL/CMM (ref 4–5.4)
SODIUM SERPL-SCNC: 140 MMOL/L (ref 135–145)
TIBC SERPL-MCNC: 295 UG/DL (ref 260–430)
TRANSFERRIN SERPL-MCNC: 211 MG/DL (ref 203–362)
TRIGL SERPL-MCNC: 113 MG/DL
TSH SERPL-ACNC: 2.74 UIU/ML (ref 0.45–5.33)
VIT B12 SERPL-MCNC: 353 PG/ML (ref 180–914)
WBC # BLD AUTO: 4.4 THOU/CMM (ref 4–10.5)

## 2024-05-15 RX ORDER — CLONAZEPAM 0.5 MG/1
0.5 TABLET ORAL
Qty: 30 TABLET | Refills: 0 | Status: SHIPPED | OUTPATIENT
Start: 2024-05-15

## 2024-05-15 NOTE — ASSESSMENT & PLAN NOTE
Still having breakthrough symptoms. Will get sleep consult to rule out limb movement disorder. Not sleeping well at night is contributing to his daytime fatigue and possibly to his cognitive issues as well. Will try klonopin at bedtime. Will check labs including iron panel as well. Patient was informed that this medicine has an abuse potential and may be habit forming. We discussed that this may also cause drowsiness. The medication should not be combined with alcohol. The patient was informed not to operate machinery while taking this medication and should not drive until they know how they respond to the medication. The patient verbalized understanding of this.

## 2024-05-15 NOTE — PROGRESS NOTES
Ambulatory Visit  Name: Merrick Garner      : 1970      MRN: [id number]  Encounter Provider: Gabby Mireles PA-C  Encounter Date: 2024   Encounter department: [facility]    Assessment & Plan   1. RLS (restless legs syndrome)  Assessment & Plan:  Still having breakthrough symptoms. Will get sleep consult to rule out limb movement disorder. Not sleeping well at night is contributing to his daytime fatigue and possibly to his cognitive issues as well. Will try klonopin at bedtime. Will check labs including iron panel as well. Patient was informed that this medicine has an abuse potential and may be habit forming. We discussed that this may also cause drowsiness. The medication should not be combined with alcohol. The patient was informed not to operate machinery while taking this medication and should not drive until they know how they respond to the medication. The patient verbalized understanding of this.     Orders:  -     Magnesium; Future  -     Iron Panel (Includes Ferritin, Iron Sat%, Iron, and TIBC); Future  -     Vitamin B12; Future  -     Lyme Total AB W Reflex to IGM/IGG; Future  -     Ambulatory Referral to Sleep Medicine; Future  -     clonazePAM (KlonoPIN) 0.5 mg tablet; Take 1 tablet (0.5 mg total) by mouth daily at bedtime  2. Subacute cough  Assessment & Plan:  CXR ordered to better evaluate.   Orders:  -     XR chest pa & lateral; Future; Expected date: 2024  3. Screening PSA (prostate specific antigen)  -     PSA, Total Screen; Future  4. Sjogren's syndrome without extraglandular involvement (HCC)  Assessment & Plan:  Continue following with rheumatology        Depression Screening and Follow-up Plan: Patient was screened for depression during today's encounter. They screened negative with a PHQ-9 score of 0.        History of Present Illness     Pt presents for evaluation of issues with RLS, chronic fatigue, cognitive decline. He notes RLS symptoms that keep  him awake at night. He has tried gabapentin and is now on high dose requip but still has symptoms. He did have a home study previously but we discussed that this would not be the best evaluation for RLS or periodic limb movement disorder. He is also noting issues with his focus and cognition. He has trouble remembering things, even things he does at work every day. He has tried ADD treatments which have not been helpful. He has been seen by neurology and rheumatology. Lastly, he relates a harsh cough that occurs every morning and concerns his family.       Review of Systems   Constitutional:  Positive for fatigue. Negative for chills and fever.   HENT:  Negative for congestion, ear pain, hearing loss, postnasal drip, rhinorrhea, sinus pressure, sinus pain, sore throat and trouble swallowing.    Eyes:  Negative for pain and visual disturbance.   Respiratory:  Positive for cough. Negative for chest tightness, shortness of breath and wheezing.    Cardiovascular: Negative.  Negative for chest pain, palpitations and leg swelling.   Gastrointestinal:  Negative for abdominal pain, blood in stool, constipation, diarrhea, nausea and vomiting.   Endocrine: Negative for cold intolerance, heat intolerance, polydipsia, polyphagia and polyuria.   Genitourinary:  Negative for difficulty urinating, dysuria, flank pain and urgency.   Musculoskeletal:  Positive for arthralgias and myalgias. Negative for back pain and gait problem.   Skin:  Negative for rash.   Allergic/Immunologic: Negative.    Neurological:  Negative for dizziness, weakness, light-headedness and headaches.   Hematological: Negative.    Psychiatric/Behavioral:  Positive for decreased concentration and sleep disturbance. Negative for behavioral problems and dysphoric mood. The patient is nervous/anxious.      Past Medical History:   Diagnosis Date    DDD (degenerative disc disease), lumbar 10/22/2018    ED (erectile dysfunction)     Groin strain     Hyperlipidemia      Shoulder pain, bilateral      Past Surgical History:   Procedure Laterality Date    ELBOW SURGERY      FL INJECTION RIGHT HIP (ARTHROGRAM)  12/24/2019    NASAL SEPTUM SURGERY      SHOULDER SURGERY Right 12/04/2019    VASECTOMY       Family History   Problem Relation Age of Onset    No Known Problems Mother     No Known Problems Father      Social History     Tobacco Use    Smoking status: Never     Passive exposure: Never    Smokeless tobacco: Never   Vaping Use    Vaping status: Never Used   Substance and Sexual Activity    Alcohol use: Yes     Comment: Social    Drug use: No    Sexual activity: Not on file     Current Outpatient Medications on File Prior to Visit   Medication Sig    acyclovir (ZOVIRAX) 200 mg capsule Take 2 capsules (400 mg total) by mouth 3 (three) times a day for 7 days    buPROPion (WELLBUTRIN SR) 150 mg 12 hr tablet Take 150 mg by mouth 2 (two) times a day    hydroxychloroquine (PLAQUENIL) 200 mg tablet Take 200 mg by mouth daily with breakfast    meloxicam (MOBIC) 15 mg tablet Take 1 tablet (15 mg total) by mouth daily as needed for moderate pain    rOPINIRole (REQUIP XL) 4 MG 24 hr tablet Take 1 tablet (4 mg total) by mouth daily at bedtime (Patient taking differently: Take 4 mg by mouth daily at bedtime Patient states He takes 6 mg daily)    testosterone (ANDROGEL) 1.62 % TD gel pump Apply 1 pump to one shoulder and 2 pumps to the other shoulder daily     No Known Allergies  Immunization History   Administered Date(s) Administered    COVID-19 Pfizer vac 5-11y luh-sucrose 0.2 mL IM (orange cap) 03/27/2021, 04/17/2021    INFLUENZA 10/01/2017, 10/08/2019, 10/19/2020, 10/28/2022    Influenza Injectable, MDCK, Preservative Free, Quadrivalent, 0.5 mL 10/09/2018    Influenza Quadrivalent Preservative Free 3 years and older IM 10/08/2019    Influenza, injectable, quadrivalent, preservative free 0.5 mL 10/15/2021    Influenza, recombinant, quadrivalent,injectable, preservative free 10/19/2020,  "10/28/2022    Influenza, seasonal, injectable 11/17/2016, 10/01/2017    Tuberculin Skin Test-PPD Intradermal 01/29/2018    influenza, injectable, quadrivalent 10/09/2018     Objective     /88 (BP Location: Left arm, Patient Position: Sitting)   Pulse 86   Temp (!) 96.1 °F (35.6 °C) (Tympanic)   Ht 5' 5\" (1.651 m)   Wt 79 kg (174 lb 4 oz)   SpO2 99%   BMI 29.00 kg/m²     Physical Exam  Vitals and nursing note reviewed.   Constitutional:       Appearance: He is well-developed.   HENT:      Head: Normocephalic and atraumatic.      Right Ear: External ear normal.      Left Ear: External ear normal.      Nose: Nose normal.   Eyes:      Conjunctiva/sclera: Conjunctivae normal.      Pupils: Pupils are equal, round, and reactive to light.   Cardiovascular:      Rate and Rhythm: Normal rate and regular rhythm.      Heart sounds: Normal heart sounds.   Pulmonary:      Breath sounds: Normal breath sounds.   Abdominal:      General: Bowel sounds are normal.      Palpations: Abdomen is soft.   Musculoskeletal:         General: Normal range of motion.      Cervical back: Normal range of motion and neck supple.   Skin:     General: Skin is warm and dry.   Neurological:      Mental Status: He is alert and oriented to person, place, and time.   Psychiatric:         Behavior: Behavior normal.         Thought Content: Thought content normal.         Judgment: Judgment normal.       Administrative Statements   I have spent a total time of 30 minutes on 5/14/24 In caring for this patient including Risks and benefits of tx options, Instructions for management, and Patient and family education.  "

## 2024-05-15 NOTE — RESULT ENCOUNTER NOTE
Eduardo Sin  I reviewed your labs and all look great but your B12 is low, we like it to be at least 400. Id recommend getting vitamin b12 shots monthly for 3 months, would you be agreeable?  -Gabby

## 2024-05-16 LAB
B BURGDOR AB SER QL IA: NEGATIVE INDEX
B BURGDOR AB SER-IMP: NORMAL

## 2024-05-17 ENCOUNTER — CLINICAL SUPPORT (OUTPATIENT)
Dept: INTERNAL MEDICINE CLINIC | Facility: CLINIC | Age: 54
End: 2024-05-17
Payer: COMMERCIAL

## 2024-05-17 DIAGNOSIS — E53.8 B12 DEFICIENCY: Primary | ICD-10-CM

## 2024-05-17 RX ORDER — CYANOCOBALAMIN 1000 UG/ML
1000 INJECTION, SOLUTION INTRAMUSCULAR; SUBCUTANEOUS
Status: SHIPPED | OUTPATIENT
Start: 2024-05-17 | End: 2024-08-15

## 2024-05-17 RX ADMIN — CYANOCOBALAMIN 1000 MCG: 1000 INJECTION, SOLUTION INTRAMUSCULAR; SUBCUTANEOUS at 15:02

## 2024-06-14 PROBLEM — R05.2 SUBACUTE COUGH: Status: RESOLVED | Noted: 2024-05-15 | Resolved: 2024-06-14

## 2024-06-24 DIAGNOSIS — G25.81 RLS (RESTLESS LEGS SYNDROME): ICD-10-CM

## 2024-06-25 RX ORDER — CLONAZEPAM 0.5 MG/1
0.5 TABLET ORAL
Qty: 30 TABLET | Refills: 0 | Status: SHIPPED | OUTPATIENT
Start: 2024-06-25

## 2024-07-02 ENCOUNTER — TELEPHONE (OUTPATIENT)
Age: 54
End: 2024-07-02

## 2024-07-02 NOTE — TELEPHONE ENCOUNTER
Pt called asking about being put on the nurses schedule on Monday when he has his B-12 injection, as Dr Saniya Pratt(physchiatrist) would like for him to have it done before he is put on a medication that has a stimulant in it, please advise either way as the pt would like to only make one trip to the office

## 2024-07-08 ENCOUNTER — CLINICAL SUPPORT (OUTPATIENT)
Dept: INTERNAL MEDICINE CLINIC | Facility: CLINIC | Age: 54
End: 2024-07-08
Payer: COMMERCIAL

## 2024-07-08 DIAGNOSIS — E53.8 B12 DEFICIENCY: Primary | ICD-10-CM

## 2024-07-08 PROCEDURE — 96372 THER/PROPH/DIAG INJ SC/IM: CPT | Performed by: PHYSICIAN ASSISTANT

## 2024-07-08 RX ADMIN — CYANOCOBALAMIN 1000 MCG: 1000 INJECTION, SOLUTION INTRAMUSCULAR; SUBCUTANEOUS at 10:57

## 2024-07-17 ENCOUNTER — TELEPHONE (OUTPATIENT)
Age: 54
End: 2024-07-17

## 2024-07-17 NOTE — TELEPHONE ENCOUNTER
PT called in stating that [facility] still have not received his EKG Results, BP & Pulse readings.     PT is requesting to please fax them his EKG, BP & Pulse readings.     FAX#: [phone]    For any further information or questions, please call pt. Thank you!    Merrick Garner   [phone]

## 2024-07-23 NOTE — TELEPHONE ENCOUNTER
Patient called office to follow up on fax to be sent to [phone].    Spoke with office clerical and stated they will fax over the information.     Message passed along to the patient.

## 2024-08-12 ENCOUNTER — CLINICAL SUPPORT (OUTPATIENT)
Dept: INTERNAL MEDICINE CLINIC | Facility: CLINIC | Age: 54
End: 2024-08-12
Payer: COMMERCIAL

## 2024-08-12 DIAGNOSIS — E53.8 B12 DEFICIENCY: Primary | ICD-10-CM

## 2024-08-12 PROCEDURE — 96372 THER/PROPH/DIAG INJ SC/IM: CPT

## 2024-08-12 RX ADMIN — CYANOCOBALAMIN 1000 MCG: 1000 INJECTION, SOLUTION INTRAMUSCULAR; SUBCUTANEOUS at 07:52

## 2024-08-21 DIAGNOSIS — Z00.6 ENCOUNTER FOR EXAMINATION FOR NORMAL COMPARISON OR CONTROL IN CLINICAL RESEARCH PROGRAM: ICD-10-CM

## 2024-08-30 ENCOUNTER — APPOINTMENT (OUTPATIENT)
Dept: LAB | Facility: CLINIC | Age: 54
End: 2024-08-30

## 2024-08-30 DIAGNOSIS — Z00.6 ENCOUNTER FOR EXAMINATION FOR NORMAL COMPARISON OR CONTROL IN CLINICAL RESEARCH PROGRAM: ICD-10-CM

## 2024-08-30 DIAGNOSIS — Z12.5 SCREENING PSA (PROSTATE SPECIFIC ANTIGEN): ICD-10-CM

## 2024-08-30 DIAGNOSIS — Z13.29 SCREENING FOR THYROID DISORDER: ICD-10-CM

## 2024-08-30 DIAGNOSIS — E55.9 VITAMIN D DEFICIENCY: ICD-10-CM

## 2024-08-30 DIAGNOSIS — E78.00 PURE HYPERCHOLESTEROLEMIA: ICD-10-CM

## 2024-08-30 DIAGNOSIS — M47.816 LUMBAR SPONDYLOSIS: ICD-10-CM

## 2024-08-30 DIAGNOSIS — Z13.0 SCREENING FOR DEFICIENCY ANEMIA: ICD-10-CM

## 2024-08-30 DIAGNOSIS — G25.81 RLS (RESTLESS LEGS SYNDROME): ICD-10-CM

## 2024-08-30 DIAGNOSIS — Z13.1 SCREENING FOR DIABETES MELLITUS: ICD-10-CM

## 2024-08-30 PROCEDURE — 36415 COLL VENOUS BLD VENIPUNCTURE: CPT

## 2024-09-11 DIAGNOSIS — G25.81 RLS (RESTLESS LEGS SYNDROME): ICD-10-CM

## 2024-09-12 RX ORDER — CLONAZEPAM 0.5 MG/1
0.5 TABLET ORAL
Qty: 30 TABLET | Refills: 0 | Status: SHIPPED | OUTPATIENT
Start: 2024-09-12

## 2024-09-16 LAB
APOB+LDLR+PCSK9 GENE MUT ANL BLD/T: NOT DETECTED
BRCA1+BRCA2 DEL+DUP + FULL MUT ANL BLD/T: NOT DETECTED
MLH1+MSH2+MSH6+PMS2 GN DEL+DUP+FUL M: NOT DETECTED

## 2024-10-16 DIAGNOSIS — G25.81 RLS (RESTLESS LEGS SYNDROME): ICD-10-CM

## 2024-10-17 RX ORDER — CLONAZEPAM 0.5 MG/1
0.5 TABLET ORAL
Qty: 30 TABLET | Refills: 0 | Status: SHIPPED | OUTPATIENT
Start: 2024-10-17

## 2024-11-18 ENCOUNTER — OFFICE VISIT (OUTPATIENT)
Dept: INTERNAL MEDICINE CLINIC | Facility: CLINIC | Age: 54
End: 2024-11-18
Payer: COMMERCIAL

## 2024-11-18 VITALS
SYSTOLIC BLOOD PRESSURE: 132 MMHG | OXYGEN SATURATION: 96 % | TEMPERATURE: 97.9 F | DIASTOLIC BLOOD PRESSURE: 82 MMHG | HEIGHT: 65 IN | BODY MASS INDEX: 29.46 KG/M2 | HEART RATE: 71 BPM | WEIGHT: 176.8 LBS

## 2024-11-18 DIAGNOSIS — Z23 ENCOUNTER FOR IMMUNIZATION: ICD-10-CM

## 2024-11-18 DIAGNOSIS — Z11.4 SCREENING FOR HIV (HUMAN IMMUNODEFICIENCY VIRUS): ICD-10-CM

## 2024-11-18 DIAGNOSIS — Z00.00 WELL ADULT EXAM: Primary | ICD-10-CM

## 2024-11-18 PROCEDURE — 90472 IMMUNIZATION ADMIN EACH ADD: CPT

## 2024-11-18 PROCEDURE — 99396 PREV VISIT EST AGE 40-64: CPT | Performed by: PHYSICIAN ASSISTANT

## 2024-11-18 PROCEDURE — 90715 TDAP VACCINE 7 YRS/> IM: CPT

## 2024-11-18 PROCEDURE — 90750 HZV VACC RECOMBINANT IM: CPT

## 2024-11-18 PROCEDURE — 90471 IMMUNIZATION ADMIN: CPT

## 2024-11-18 RX ORDER — SERTRALINE HYDROCHLORIDE 25 MG/1
25 TABLET, FILM COATED ORAL DAILY
COMMUNITY

## 2024-11-18 NOTE — PROGRESS NOTES
Adult Annual Physical  Name: Merrick Garner      : 1970      MRN: [id number]  Encounter Provider: Gabby Mireles PA-C  Encounter Date: 2024   Encounter department: [facility]    Assessment & Plan  Screening for HIV (human immunodeficiency virus)    Orders:    HIV 1/2 AG/AB w Reflex [facility] for 2 yr old and above; Future    Encounter for immunization    Orders:    Zoster Vaccine Recombinant IM    TDAP VACCINE GREATER THAN OR EQUAL TO 8YO IM    Well adult exam         Immunizations and preventive care screenings were discussed with patient today. Appropriate education was printed on patient's after visit summary.    Discussed risks and benefits of prostate cancer screening. We discussed the controversial history of PSA screening for prostate cancer in the [place] as well as the risk of over detection and over treatment of prostate cancer by way of PSA screening.  The patient understands that PSA blood testing is an imperfect way to screen for prostate cancer and that elevated PSA levels in the blood may also be caused by infection, inflammation, prostatic trauma or manipulation, urological procedures, or by benign prostatic enlargement.    The role of the digital rectal examination in prostate cancer screening was also discussed and I discussed with him that there is large interobserver variability in the findings of digital rectal examination.    Counseling:  Labs ordered. Tdap and Shingrix #1 given. CRC screening current.          History of Present Illness     Adult Annual Physical:  Patient presents for annual physical.     Diet and Physical Activity:  - Diet/Nutrition: well balanced diet.  - Exercise: walking.    General Health:  - Sleep: sleeps well.  - Hearing: normal hearing bilateral ears.  - Vision: goes for regular eye exams.  - Dental: regular dental visits.     Health:  - History of STDs: no.   - Urinary symptoms: none.     Advanced Care Planning:  - Has an  advanced directive?: no    - Has a durable medical POA?: no    - ACP document given to patient?: no      Review of Systems   Constitutional:  Negative for chills and fever.   HENT:  Negative for congestion, ear pain, hearing loss, postnasal drip, rhinorrhea, sinus pressure, sinus pain, sore throat and trouble swallowing.    Eyes:  Negative for pain and visual disturbance.   Respiratory:  Negative for cough, chest tightness, shortness of breath and wheezing.    Cardiovascular: Negative.  Negative for chest pain, palpitations and leg swelling.   Gastrointestinal:  Negative for abdominal pain, blood in stool, constipation, diarrhea, nausea and vomiting.   Endocrine: Negative for cold intolerance, heat intolerance, polydipsia, polyphagia and polyuria.   Genitourinary:  Negative for difficulty urinating, dysuria, flank pain and urgency.   Musculoskeletal:  Negative for arthralgias, back pain, gait problem and myalgias.   Skin:  Negative for rash.   Allergic/Immunologic: Negative.    Neurological:  Negative for dizziness, weakness, light-headedness and headaches.   Hematological: Negative.    Psychiatric/Behavioral:  Negative for behavioral problems, dysphoric mood and sleep disturbance. The patient is not nervous/anxious.      Current Outpatient Medications on File Prior to Visit   Medication Sig Dispense Refill    acyclovir (ZOVIRAX) 200 mg capsule Take 2 capsules (400 mg total) by mouth 3 (three) times a day for 7 days 42 capsule 0    buPROPion (WELLBUTRIN SR) 150 mg 12 hr tablet Take 150 mg by mouth 2 (two) times a day      meloxicam (MOBIC) 15 mg tablet Take 1 tablet (15 mg total) by mouth daily as needed for moderate pain 90 tablet 1    sertraline (ZOLOFT) 25 mg tablet Take 25 mg by mouth daily      testosterone (ANDROGEL) 1.62 % TD gel pump Apply 1 pump to one shoulder and 2 pumps to the other shoulder daily 75 g 0    clonazePAM (KlonoPIN) 0.5 mg tablet Take 1 tablet (0.5 mg total) by mouth daily at bedtime 30  "tablet 0    [DISCONTINUED] hydroxychloroquine (PLAQUENIL) 200 mg tablet Take 200 mg by mouth daily with breakfast (Patient not taking: Reported on 11/18/2024)      [DISCONTINUED] rOPINIRole (REQUIP XL) 4 MG 24 hr tablet Take 1 tablet (4 mg total) by mouth daily at bedtime (Patient not taking: Reported on 11/18/2024) 90 tablet 3     No current facility-administered medications on file prior to visit.      Social History     Tobacco Use    Smoking status: Never     Passive exposure: Never    Smokeless tobacco: Never   Vaping Use    Vaping status: Never Used   Substance and Sexual Activity    Alcohol use: Yes     Comment: Social    Drug use: No    Sexual activity: Not on file       Objective   /82   Pulse 71   Temp 97.9 °F (36.6 °C) (Tympanic)   Ht 5' 5\" (1.651 m)   Wt 80.2 kg (176 lb 12.8 oz)   SpO2 96%   BMI 29.42 kg/m²     Physical Exam  Vitals and nursing note reviewed.   Constitutional:       General: He is not in acute distress.     Appearance: Normal appearance. He is well-developed. He is not diaphoretic.   HENT:      Head: Normocephalic and atraumatic.      Right Ear: External ear normal.      Left Ear: External ear normal.      Nose: Nose normal.      Mouth/Throat:      Pharynx: No oropharyngeal exudate.   Eyes:      General: No scleral icterus.        Right eye: No discharge.         Left eye: No discharge.      Conjunctiva/sclera: Conjunctivae normal.      Pupils: Pupils are equal, round, and reactive to light.   Neck:      Thyroid: No thyromegaly.   Cardiovascular:      Rate and Rhythm: Normal rate and regular rhythm.      Heart sounds: Normal heart sounds. No murmur heard.     No friction rub. No gallop.   Pulmonary:      Effort: Pulmonary effort is normal. No respiratory distress.      Breath sounds: Normal breath sounds. No wheezing or rales.   Abdominal:      General: Bowel sounds are normal. There is no distension.      Palpations: Abdomen is soft.      Tenderness: There is no abdominal " tenderness.   Musculoskeletal:         General: No tenderness or deformity. Normal range of motion.      Cervical back: Normal range of motion and neck supple.   Skin:     General: Skin is warm and dry.   Neurological:      Mental Status: He is alert and oriented to person, place, and time.      Cranial Nerves: No cranial nerve deficit.   Psychiatric:         Behavior: Behavior normal.         Thought Content: Thought content normal.         Judgment: Judgment normal.       Administrative Statements   I have spent a total time of 15 minutes in caring for this patient on the day of the visit/encounter including Risks and benefits of tx options, Instructions for management, Patient and family education, Importance of tx compliance, Risk factor reductions, Impressions, Counseling / Coordination of care, Documenting in the medical record, and Reviewing / ordering tests, medicine, procedures  .

## 2024-11-25 ENCOUNTER — TELEPHONE (OUTPATIENT)
Age: 54
End: 2024-11-25

## 2024-11-25 NOTE — TELEPHONE ENCOUNTER
Patient wanted his lab orders put in so he can pull from his my chart to take to [facility]. Please let him know when they are in his my chart

## 2024-12-01 DIAGNOSIS — G25.81 RLS (RESTLESS LEGS SYNDROME): ICD-10-CM

## 2024-12-02 RX ORDER — CLONAZEPAM 0.5 MG/1
0.5 TABLET ORAL
Qty: 30 TABLET | Refills: 0 | Status: SHIPPED | OUTPATIENT
Start: 2024-12-02

## 2024-12-06 LAB
25(OH)D3+25(OH)D2 SERPL-MCNC: 46 NG/ML (ref 30–100)
ALBUMIN SERPL-MCNC: 4.4 G/DL (ref 3.5–5.7)
ALP SERPL-CCNC: 51 U/L (ref 35–120)
ALT SERPL-CCNC: 22 U/L
ANION GAP SERPL CALCULATED.3IONS-SCNC: 9 MMOL/L (ref 3–11)
AST SERPL-CCNC: 18 U/L
BASOPHILS # BLD AUTO: 0.1 THOU/CMM (ref 0–0.1)
BASOPHILS NFR BLD AUTO: 1 %
BILIRUB SERPL-MCNC: 0.7 MG/DL (ref 0.2–1)
BUN SERPL-MCNC: 20 MG/DL (ref 7–28)
CALCIUM SERPL-MCNC: 9.2 MG/DL (ref 8.5–10.5)
CHLORIDE SERPL-SCNC: 107 MMOL/L (ref 100–109)
CHOLEST SERPL-MCNC: 227 MG/DL
CHOLEST/HDLC SERPL: 4.9 {RATIO}
CO2 SERPL-SCNC: 25 MMOL/L (ref 21–31)
CREAT SERPL-MCNC: 0.84 MG/DL (ref 0.53–1.3)
CYTOLOGY CMNT CVX/VAG CYTO-IMP: NORMAL
DIFFERENTIAL METHOD BLD: ABNORMAL
EOSINOPHIL # BLD AUTO: 0.2 THOU/CMM (ref 0–0.5)
EOSINOPHIL NFR BLD AUTO: 4 %
ERYTHROCYTE [DISTWIDTH] IN BLOOD BY AUTOMATED COUNT: 12.8 % (ref 12–16)
GFR/BSA.PRED SERPLBLD CYS-BASED-ARV: 103 ML/MIN/{1.73_M2}
GLUCOSE SERPL-MCNC: 94 MG/DL (ref 65–99)
HCT VFR BLD AUTO: 44.2 % (ref 37–48)
HDLC SERPL-MCNC: 46 MG/DL (ref 23–92)
HGB BLD-MCNC: 15.2 G/DL (ref 12.5–17)
HIV 1+2 AB+HIV1 P24 AG SERPL QL IA: NONREACTIVE
IRON SATN MFR SERPL: 35 % (ref 20–50)
IRON SERPL-MCNC: 116 UG/DL (ref 50–212)
LDLC SERPL CALC-MCNC: 146 MG/DL
LYMPHOCYTES # BLD AUTO: 1.4 THOU/CMM (ref 1–3)
LYMPHOCYTES NFR BLD AUTO: 27 %
MAGNESIUM SERPL-MCNC: 2.1 MG/DL (ref 1.4–2.2)
MCH RBC QN AUTO: 29.7 PG (ref 27–36)
MCHC RBC AUTO-ENTMCNC: 34.3 G/DL (ref 32–37)
MCV RBC AUTO: 87 FL (ref 80–100)
MONOCYTES # BLD AUTO: 0.4 THOU/CMM (ref 0.3–1)
MONOCYTES NFR BLD AUTO: 9 %
NEUTROPHILS # BLD AUTO: 3 THOU/CMM (ref 1.8–7.8)
NEUTROPHILS NFR BLD AUTO: 59 %
NONHDLC SERPL-MCNC: 181 MG/DL
PHOSPHATE SERPL-MCNC: 3.1 MG/DL (ref 2.3–4.6)
PLATELET # BLD AUTO: 233 THOU/CMM (ref 140–350)
PMV BLD REES-ECKER: 7.1 FL (ref 7.5–11.3)
POTASSIUM SERPL-SCNC: 4.2 MMOL/L (ref 3.5–5.2)
PROT SERPL-MCNC: 6.9 G/DL (ref 6.3–8.3)
PSA SERPL-MCNC: 0.41 NG/ML
RBC # BLD AUTO: 5.1 MILL/CMM (ref 4–5.4)
SODIUM SERPL-SCNC: 141 MMOL/L (ref 135–145)
TIBC SERPL-MCNC: 332 UG/DL (ref 260–430)
TRANSFERRIN SERPL-MCNC: 237 MG/DL (ref 203–362)
TRIGL SERPL-MCNC: 174 MG/DL
TSH SERPL-ACNC: 2.25 UIU/ML (ref 0.45–5.33)
VIT B12 SERPL-MCNC: 494 PG/ML (ref 180–914)
WBC # BLD AUTO: 5.1 THOU/CMM (ref 4–10.5)

## 2024-12-08 LAB
B BURGDOR AB SER-IMP: NORMAL
B BURGDOR IGG+IGM SER-ACNC: NEGATIVE INDEX
B BURGDOR IGM SER IA-ACNC: NEGATIVE INDEX

## 2024-12-13 ENCOUNTER — RESULTS FOLLOW-UP (OUTPATIENT)
Dept: INTERNAL MEDICINE CLINIC | Facility: CLINIC | Age: 54
End: 2024-12-13

## 2024-12-13 NOTE — RESULT ENCOUNTER NOTE
Eduardo Sin  I received and reviewed your recent labs and everything looked good. Your cholesterol did increase slightly so just watch your diet a little closer with saturated fats/red meat/fried foods. Please reach out with any specific questions or concerns. Have a great day  -Gabby

## 2025-02-27 DIAGNOSIS — E29.1 TESTICULAR HYPOGONADISM: ICD-10-CM

## 2025-02-28 RX ORDER — TESTOSTERONE 1.62 MG/G
GEL TRANSDERMAL
Qty: 75 G | Refills: 0 | Status: SHIPPED | OUTPATIENT
Start: 2025-02-28

## 2025-04-24 DIAGNOSIS — L25.9 CONTACT DERMATITIS, UNSPECIFIED CONTACT DERMATITIS TYPE, UNSPECIFIED TRIGGER: Primary | ICD-10-CM

## 2025-04-24 RX ORDER — KETOCONAZOLE 20 MG/ML
1 SHAMPOO, SUSPENSION TOPICAL ONCE
Qty: 120 ML | Refills: 0 | Status: SHIPPED | OUTPATIENT
Start: 2025-04-24 | End: 2025-04-24

## 2025-05-27 DIAGNOSIS — M25.562 CHRONIC PAIN OF LEFT KNEE: ICD-10-CM

## 2025-05-27 DIAGNOSIS — G89.29 CHRONIC PAIN OF LEFT KNEE: ICD-10-CM

## 2025-05-28 RX ORDER — MELOXICAM 15 MG/1
15 TABLET ORAL DAILY PRN
Qty: 90 TABLET | Refills: 0 | Status: SHIPPED | OUTPATIENT
Start: 2025-05-28

## 2025-05-28 NOTE — TELEPHONE ENCOUNTER
Patient needs an appointment. Please contact the patient to schedule an appointment. Last office visit: 11-, courtesy refill given

## 2025-06-09 ENCOUNTER — OFFICE VISIT (OUTPATIENT)
Dept: INTERNAL MEDICINE CLINIC | Facility: CLINIC | Age: 55
End: 2025-06-09
Payer: COMMERCIAL

## 2025-06-09 VITALS
TEMPERATURE: 97.6 F | BODY MASS INDEX: 28.82 KG/M2 | WEIGHT: 173 LBS | OXYGEN SATURATION: 97 % | HEIGHT: 65 IN | SYSTOLIC BLOOD PRESSURE: 120 MMHG | DIASTOLIC BLOOD PRESSURE: 84 MMHG | HEART RATE: 61 BPM

## 2025-06-09 DIAGNOSIS — M35.00 SJOGREN'S SYNDROME WITHOUT EXTRAGLANDULAR INVOLVEMENT (HCC): ICD-10-CM

## 2025-06-09 DIAGNOSIS — Z23 ENCOUNTER FOR IMMUNIZATION: ICD-10-CM

## 2025-06-09 DIAGNOSIS — F32.A ANXIETY AND DEPRESSION: ICD-10-CM

## 2025-06-09 DIAGNOSIS — G25.81 RLS (RESTLESS LEGS SYNDROME): ICD-10-CM
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
## 2025-06-09 DIAGNOSIS — E29.1 TESTICULAR HYPOGONADISM: ICD-10-CM

## 2025-06-09 DIAGNOSIS — F41.9 ANXIETY AND DEPRESSION: ICD-10-CM

## 2025-06-09 PROCEDURE — 90471 IMMUNIZATION ADMIN: CPT

## 2025-06-09 PROCEDURE — 99214 OFFICE O/P EST MOD 30 MIN: CPT | Performed by: PHYSICIAN ASSISTANT

## 2025-06-09 PROCEDURE — 90750 HZV VACC RECOMBINANT IM: CPT

## 2025-06-09 RX ORDER — SERTRALINE HYDROCHLORIDE 100 MG/1
100 TABLET, FILM COATED ORAL DAILY
Qty: 90 TABLET | Refills: 1 | Status: SHIPPED | OUTPATIENT
Start: 2025-06-09

## 2025-06-09 RX ORDER — BUPROPION HYDROBROMIDE 348 MG/1
1 TABLET, EXTENDED RELEASE ORAL DAILY
Qty: 30 TABLET | Refills: 2 | Status: SHIPPED | OUTPATIENT
Start: 2025-06-09
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
## 2025-06-10 LAB
B BURGDOR AB SER-IMP: NORMAL
B BURGDOR IGG+IGM SER-ACNC: NEGATIVE INDEX
B BURGDOR IGM SER IA-ACNC: NEGATIVE INDEX
SHBG SERPL-SCNC: 26.3 NMOL/L
TESTOST FREE MFR SERPL: 2.1 % (ref 1.5–3.2)
TESTOST FREE SERPL-MCNC: 30.5 PG/ML (ref 21–135)
TESTOST SERPL-MCNC: 144 NG/DL (ref 150–684)
TESTOSTERONE.FREE+WB MFR SERPL: 49.6 %
TESTOSTERONE.FREE+WB SERPL-MCNC: 71 NG/DL (ref 48–317)

## 2025-06-10 NOTE — ASSESSMENT & PLAN NOTE
Will check lyme studies. Sleep consult also ordered to investigate pts symptoms of persistent fatigue and myalgias.   Orders:  •  Lyme Total AB W Reflex to IGM/IGG; Future

## 2025-06-10 NOTE — PROGRESS NOTES
Name: Merrick Garner      : 1970      MRN: [id number]  Encounter Provider: Gabby Mireles PA-C  Encounter Date: 2025   Encounter department: [facility]  :  Assessment & Plan  Encounter for immunization    Orders:  •  Zoster Vaccine Recombinant IM    Tick bite, unspecified site, subsequent encounter  Will check lyme studies. Sleep consult also ordered to investigate pts symptoms of persistent fatigue and myalgias.   Orders:  •  Lyme Total AB W Reflex to IGM/IGG; Future    Sjogren's syndrome without extraglandular involvement (HCC)         RLS (restless legs syndrome)    Orders:  •  Ambulatory Referral to Sleep Medicine; Future    Testicular hypogonadism    Orders:  •  Testosterone, free, total; Future    Anxiety and depression      Orders:  •  buPROPion HBr ER (Aplenzin) 348 MG TB24; Take 1 tablet (348 mg total) by mouth in the morning  •  sertraline (ZOLOFT) 100 mg tablet; Take 1 tablet (100 mg total) by mouth daily          Depression Screening and Follow-up Plan: Patient was screened for depression during today's encounter. They screened negative with a PHQ-9 score of 3.        History of Present Illness   Pt presents for routine visit. He is doing fairly well overall. He is up to date with labs and CRC screening. He is due for Shingrix #2. He is noting issues with ongoing myalgias and chronic fatigue. He notes the presence of many ticks lately so we will check for lyme. He continues to follow with rheumatology. We discussed a sleep consult to evaluate for sleep disorders/chronic fatigue to see if more treatment options are available to him and he is agreeable.       Review of Systems   Constitutional:  Negative for chills and fever.   HENT:  Negative for congestion, ear pain, hearing loss, postnasal drip, rhinorrhea, sinus pressure, sinus pain, sore throat and trouble swallowing.    Eyes:  Negative for pain and visual disturbance.   Respiratory:  Negative for cough, chest  "tightness, shortness of breath and wheezing.    Cardiovascular: Negative.  Negative for chest pain, palpitations and leg swelling.   Gastrointestinal:  Negative for abdominal pain, blood in stool, constipation, diarrhea, nausea and vomiting.   Endocrine: Negative for cold intolerance, heat intolerance, polydipsia, polyphagia and polyuria.   Genitourinary:  Negative for difficulty urinating, dysuria, flank pain and urgency.   Musculoskeletal:  Negative for arthralgias, back pain, gait problem and myalgias.   Skin:  Negative for rash.   Allergic/Immunologic: Negative.    Neurological:  Negative for dizziness, weakness, light-headedness and headaches.   Hematological: Negative.    Psychiatric/Behavioral:  Negative for behavioral problems, dysphoric mood and sleep disturbance. The patient is not nervous/anxious.        Objective   /84 (Patient Position: Sitting, Cuff Size: Large)   Pulse 61   Temp 97.6 °F (36.4 °C) (Tympanic)   Ht 5' 5\" (1.651 m)   Wt 78.5 kg (173 lb)   SpO2 97%   BMI 28.79 kg/m²      Physical Exam  Vitals and nursing note reviewed.   Constitutional:       General: He is not in acute distress.     Appearance: Normal appearance. He is well-developed. He is not diaphoretic.   HENT:      Head: Normocephalic and atraumatic.      Right Ear: External ear normal.      Left Ear: External ear normal.      Nose: Nose normal.      Mouth/Throat:      Pharynx: No oropharyngeal exudate.     Eyes:      General: No scleral icterus.        Right eye: No discharge.         Left eye: No discharge.      Conjunctiva/sclera: Conjunctivae normal.      Pupils: Pupils are equal, round, and reactive to light.     Neck:      Thyroid: No thyromegaly.     Cardiovascular:      Rate and Rhythm: Normal rate and regular rhythm.      Heart sounds: Normal heart sounds. No murmur heard.     No friction rub. No gallop.   Pulmonary:      Effort: Pulmonary effort is normal. No respiratory distress.      Breath sounds: Normal " breath sounds. No wheezing or rales.   Abdominal:      General: Bowel sounds are normal. There is no distension.      Palpations: Abdomen is soft.      Tenderness: There is no abdominal tenderness.     Musculoskeletal:         General: No tenderness or deformity. Normal range of motion.      Cervical back: Normal range of motion and neck supple.     Skin:     General: Skin is warm and dry.     Neurological:      Mental Status: He is alert and oriented to person, place, and time.      Cranial Nerves: No cranial nerve deficit.     Psychiatric:         Behavior: Behavior normal.         Thought Content: Thought content normal.         Judgment: Judgment normal.

## 2025-06-10 NOTE — ASSESSMENT & PLAN NOTE
Orders:  •  buPROPion HBr ER (Aplenzin) 348 MG TB24; Take 1 tablet (348 mg total) by mouth in the morning  •  sertraline (ZOLOFT) 100 mg tablet; Take 1 tablet (100 mg total) by mouth daily

## 2025-06-13 ENCOUNTER — RESULTS FOLLOW-UP (OUTPATIENT)
Dept: INTERNAL MEDICINE CLINIC | Facility: CLINIC | Age: 55
End: 2025-06-13

## 2025-06-23 DIAGNOSIS — E29.1 TESTICULAR HYPOGONADISM: ICD-10-CM

## 2025-06-24 RX ORDER — TESTOSTERONE 1.62 MG/G
GEL TRANSDERMAL
Qty: 75 G | Refills: 0 | Status: SHIPPED | OUTPATIENT
Start: 2025-06-24

## 2025-07-06 DIAGNOSIS — F32.A ANXIETY AND DEPRESSION: ICD-10-CM

## 2025-07-06 DIAGNOSIS — F41.9 ANXIETY AND DEPRESSION: ICD-10-CM

## 2025-07-06 RX ORDER — SERTRALINE HYDROCHLORIDE 100 MG/1
100 TABLET, FILM COATED ORAL DAILY
Qty: 90 TABLET | Refills: 0 | Status: SHIPPED | OUTPATIENT
Start: 2025-07-06

## 2025-07-25 DIAGNOSIS — E29.1 TESTICULAR HYPOGONADISM: ICD-10-CM

## 2025-07-28 RX ORDER — TESTOSTERONE 1.62 MG/G
GEL TRANSDERMAL
Qty: 75 G | Refills: 0 | Status: SHIPPED | OUTPATIENT
Start: 2025-07-28

## 2025-08-03 DIAGNOSIS — F41.9 ANXIETY AND DEPRESSION: ICD-10-CM

## 2025-08-03 DIAGNOSIS — F32.A ANXIETY AND DEPRESSION: ICD-10-CM

## 2025-08-05 RX ORDER — BUPROPION HYDROBROMIDE 348 MG/1
1 TABLET, EXTENDED RELEASE ORAL DAILY
Qty: 90 TABLET | Refills: 1 | Status: SHIPPED | OUTPATIENT
Start: 2025-08-05